# Patient Record
Sex: FEMALE | Race: WHITE | NOT HISPANIC OR LATINO | URBAN - METROPOLITAN AREA
[De-identification: names, ages, dates, MRNs, and addresses within clinical notes are randomized per-mention and may not be internally consistent; named-entity substitution may affect disease eponyms.]

---

## 2020-07-02 ENCOUNTER — OFFICE VISIT (OUTPATIENT)
Dept: URBAN - METROPOLITAN AREA CLINIC 72 | Facility: CLINIC | Age: 75
End: 2020-07-02

## 2020-07-25 ENCOUNTER — TELEPHONE ENCOUNTER (OUTPATIENT)
Dept: URBAN - METROPOLITAN AREA CLINIC 13 | Facility: CLINIC | Age: 75
End: 2020-07-25

## 2020-07-25 RX ORDER — ALBUTEROL SULFATE 90 UG/1
AEROSOL, METERED RESPIRATORY (INHALATION)
Refills: 0 | OUTPATIENT
End: 2020-07-02

## 2020-07-25 RX ORDER — SUCRALFATE 1 G/1
TABLET ORAL
Qty: 120 | Refills: 0 | OUTPATIENT
Start: 2019-08-30 | End: 2020-07-02

## 2020-07-25 RX ORDER — FAMOTIDINE 40 MG/1
TABLET ORAL
Qty: 180 | Refills: 0 | OUTPATIENT
Start: 2018-12-08 | End: 2020-07-02

## 2020-07-25 RX ORDER — THIAMINE HCL 100 MG
TABLET ORAL
Refills: 0 | OUTPATIENT
End: 2020-07-02

## 2020-07-26 ENCOUNTER — TELEPHONE ENCOUNTER (OUTPATIENT)
Dept: URBAN - METROPOLITAN AREA CLINIC 13 | Facility: CLINIC | Age: 75
End: 2020-07-26

## 2020-07-26 RX ORDER — ALBUTEROL SULFATE 90 UG/1
AEROSOL, METERED RESPIRATORY (INHALATION)
Qty: 8 | Refills: 0 | Status: ACTIVE | COMMUNITY
Start: 2018-10-03

## 2020-07-26 RX ORDER — EMPAGLIFLOZIN AND METFORMIN HYDROCHLORIDE 12.5; 1 MG/1; MG/1
TAKE TWO TABLETS BY MOUTH ONE TIME DAILY TABLET ORAL
Qty: 60 | Refills: 0 | Status: ACTIVE | COMMUNITY
Start: 2018-11-06

## 2020-07-26 RX ORDER — LEVOTHYROXINE SODIUM 0.1 MG/1
TABLET ORAL
Qty: 90 | Refills: 0 | Status: ACTIVE | COMMUNITY
Start: 2019-05-13

## 2020-07-26 RX ORDER — ATORVASTATIN CALCIUM 20 MG/1
TABLET, FILM COATED ORAL
Qty: 90 | Refills: 0 | Status: ACTIVE | COMMUNITY
Start: 2018-12-08

## 2020-07-26 RX ORDER — HYDROCODONE BITARTRATE AND ACETAMINOPHEN 5; 325 MG/1; MG/1
TABLET ORAL
Qty: 24 | Refills: 0 | Status: ACTIVE | COMMUNITY
Start: 2019-06-21

## 2020-07-26 RX ORDER — AMITRIPTYLINE HYDROCHLORIDE 25 MG/1
TABLET, FILM COATED ORAL
Qty: 30 | Refills: 0 | Status: ACTIVE | COMMUNITY
Start: 2019-09-26

## 2020-07-26 RX ORDER — NIFEDIPINE 60 MG/1
TAKE ONE TABLET BY MOUTH ONE TIME DAILY TABLET, FILM COATED, EXTENDED RELEASE ORAL
Qty: 90 | Refills: 0 | Status: ACTIVE | COMMUNITY
Start: 2020-02-06

## 2020-07-26 RX ORDER — DOCUSATE SODIUM 100 MG/1
TAKE ONE CAPSULE BY MOUTH THREE TIMES A DAY CAPSULE, LIQUID FILLED ORAL
Qty: 30 | Refills: 0 | Status: ACTIVE | COMMUNITY
Start: 2019-06-21

## 2020-07-26 RX ORDER — EMPAGLIFLOZIN, METFORMIN HYDROCHLORIDE 12.5; 1 MG/1; MG/1
TABLET, EXTENDED RELEASE ORAL
Refills: 0 | Status: ACTIVE | COMMUNITY
Start: 2019-10-02

## 2020-07-26 RX ORDER — METHYLPREDNISOLONE 4 MG/1
TABLET ORAL
Qty: 21 | Refills: 0 | Status: ACTIVE | COMMUNITY
Start: 2019-10-01

## 2020-07-26 RX ORDER — NIFEDIPINE 60 MG/1
TABLET, EXTENDED RELEASE ORAL
Qty: 90 | Refills: 0 | Status: ACTIVE | COMMUNITY
Start: 2019-06-10

## 2020-07-26 RX ORDER — NYSTATIN AND TRIAMCINOLONE ACETONIDE 100000; 1 MG/G; MG/G
CREAM TOPICAL
Qty: 15 | Refills: 0 | Status: ACTIVE | COMMUNITY
Start: 2018-10-09

## 2020-07-26 RX ORDER — DIAZEPAM 5 MG/1
TAKE ONE TABLET BY MOUTH 30 MINUTES PRIOR TO PROCEDURE TABLET ORAL
Qty: 1 | Refills: 0 | Status: ACTIVE | COMMUNITY
Start: 2019-09-10

## 2020-07-26 RX ORDER — EMPAGLIFLOZIN AND METFORMIN HYDROCHLORIDE 12.5; 1 MG/1; MG/1
TABLET ORAL
Qty: 60 | Refills: 0 | Status: ACTIVE | COMMUNITY
Start: 2019-04-07

## 2020-07-26 RX ORDER — EMPAGLIFLOZIN AND METFORMIN HYDROCHLORIDE 12.5; 1 MG/1; MG/1
TABLET ORAL
Qty: 60 | Refills: 0 | Status: ACTIVE | COMMUNITY
Start: 2019-09-24

## 2020-07-26 RX ORDER — LEVOTHYROXINE SODIUM 0.1 MG/1
TAKE 1 TABLET DAILY AS DIRECTED TABLET ORAL
Refills: 0 | Status: ACTIVE | COMMUNITY
Start: 2019-01-30

## 2020-07-26 RX ORDER — ASPIRIN 81 MG
TABLET, DELAYED RELEASE (ENTERIC COATED) ORAL
Refills: 0 | Status: ACTIVE | COMMUNITY

## 2020-07-26 RX ORDER — SUCRALFATE 1 G/1
TABLET ORAL
Qty: 120 | Refills: 0 | Status: ACTIVE | COMMUNITY
Start: 2019-08-30

## 2020-07-26 RX ORDER — NIFEDIPINE 60 MG/1
TAKE 1 TABLET DAILY TABLET, EXTENDED RELEASE ORAL
Refills: 0 | Status: ACTIVE | COMMUNITY
Start: 2018-12-01

## 2020-07-26 RX ORDER — FLUCONAZOLE 100 MG/1
TABLET ORAL
Qty: 8 | Refills: 0 | Status: ACTIVE | COMMUNITY
Start: 2018-11-16

## 2020-07-26 RX ORDER — FAMOTIDINE 40 MG/1
TABLET ORAL
Qty: 180 | Refills: 0 | Status: ACTIVE | COMMUNITY
Start: 2019-06-05

## 2020-07-26 RX ORDER — ATORVASTATIN CALCIUM 20 MG/1
TABLET, FILM COATED ORAL
Qty: 90 | Refills: 0 | Status: ACTIVE | COMMUNITY
Start: 2019-03-17

## 2020-07-26 RX ORDER — CALCIUM CARBONATE/VITAMIN D3 600 MG-20
TAKE 1 TABLET DAILY TABLET ORAL
Refills: 0 | Status: ACTIVE | COMMUNITY

## 2020-07-26 RX ORDER — PREDNISONE 20 MG/1
TABLET ORAL
Qty: 10 | Refills: 0 | Status: ACTIVE | COMMUNITY
Start: 2018-10-03

## 2020-07-26 RX ORDER — ONDANSETRON 4 MG/1
PLACE ONE TABLET ON THE TONGUE EVERY 6 HOURS FOR NAUSEA/VOMITING TABLET, ORALLY DISINTEGRATING ORAL
Qty: 5 | Refills: 0 | Status: ACTIVE | COMMUNITY
Start: 2019-09-13

## 2020-07-26 RX ORDER — FLUCONAZOLE 100 MG/1
TABLET ORAL
Qty: 8 | Refills: 0 | Status: ACTIVE | COMMUNITY
Start: 2018-10-09

## 2020-07-26 RX ORDER — TELMISARTAN AND HYDROCHLOROTHIAZIDE 25; 80 MG/1; MG/1
TABLET ORAL
Qty: 30 | Refills: 0 | Status: ACTIVE | COMMUNITY
Start: 2019-04-08

## 2020-07-26 RX ORDER — TELMISARTAN AND HYDROCHLOROTHIAZIDE 25; 80 MG/1; MG/1
TAKE 1 TABLET DAILY TABLET ORAL
Refills: 0 | Status: ACTIVE | COMMUNITY
Start: 2019-03-12

## 2020-07-26 RX ORDER — ONDANSETRON 4 MG/1
TABLET, ORALLY DISINTEGRATING ORAL
Qty: 20 | Refills: 0 | Status: ACTIVE | COMMUNITY
Start: 2019-06-21

## 2020-07-26 RX ORDER — ALENDRONATE SODIUM 70 MG/1
TAKE ONE TABLET BY MOUTH EVERY WEEK TABLET ORAL
Qty: 12 | Refills: 0 | Status: ACTIVE | COMMUNITY
Start: 2020-02-06

## 2020-07-26 RX ORDER — DIAZEPAM 5 MG/1
TABLET ORAL
Qty: 1 | Refills: 0 | Status: ACTIVE | COMMUNITY
Start: 2019-09-12

## 2020-07-26 RX ORDER — INSULIN DETEMIR 100 [IU]/ML
INJECTION, SOLUTION SUBCUTANEOUS
Qty: 15 | Refills: 0 | Status: ACTIVE | COMMUNITY
Start: 2018-10-04

## 2020-07-26 RX ORDER — AMOXICILLIN 875 MG/1
TABLET, FILM COATED ORAL
Qty: 14 | Refills: 0 | Status: ACTIVE | COMMUNITY
Start: 2018-10-03

## 2020-07-26 RX ORDER — INSULIN DETEMIR 100 [IU]/ML
INJECTION, SOLUTION SUBCUTANEOUS
Qty: 15 | Refills: 0 | Status: ACTIVE | COMMUNITY
Start: 2018-11-09

## 2020-07-26 RX ORDER — AMOXICILLIN 875 MG/1
TABLET, FILM COATED ORAL
Qty: 14 | Refills: 0 | Status: ACTIVE | COMMUNITY
Start: 2019-09-03

## 2020-07-26 RX ORDER — METHYLPREDNISOLONE 4 MG/1
TAKE AS DIRECTED FOR 5 DAYS TABLET ORAL
Qty: 21 | Refills: 0 | Status: ACTIVE | COMMUNITY
Start: 2019-10-07

## 2020-07-26 RX ORDER — OXYCODONE HYDROCHLORIDE 5 MG/1
TAKE ONE TABLET BY MOUTH EVERY 4 TO 6 HOURS AS NEEDED FOR SEVERE PAIN TABLET ORAL
Qty: 7 | Refills: 0 | Status: ACTIVE | COMMUNITY
Start: 2019-09-13

## 2020-11-03 ENCOUNTER — OFFICE VISIT (OUTPATIENT)
Dept: URBAN - METROPOLITAN AREA CLINIC 72 | Facility: CLINIC | Age: 75
End: 2020-11-03

## 2021-03-10 ENCOUNTER — WEB ENCOUNTER (OUTPATIENT)
Dept: URBAN - METROPOLITAN AREA CLINIC 72 | Facility: CLINIC | Age: 76
End: 2021-03-10

## 2021-03-10 ENCOUNTER — OFFICE VISIT (OUTPATIENT)
Dept: URBAN - METROPOLITAN AREA CLINIC 72 | Facility: CLINIC | Age: 76
End: 2021-03-10
Payer: MEDICARE

## 2021-03-10 VITALS
RESPIRATION RATE: 20 BRPM | HEART RATE: 85 BPM | BODY MASS INDEX: 36.53 KG/M2 | TEMPERATURE: 97.5 F | HEIGHT: 68 IN | DIASTOLIC BLOOD PRESSURE: 63 MMHG | WEIGHT: 241 LBS | SYSTOLIC BLOOD PRESSURE: 109 MMHG

## 2021-03-10 DIAGNOSIS — R15.1 FECAL SMEARING: ICD-10-CM

## 2021-03-10 DIAGNOSIS — R10.13 DYSPEPSIA: ICD-10-CM

## 2021-03-10 PROCEDURE — 99213 OFFICE O/P EST LOW 20 MIN: CPT | Performed by: INTERNAL MEDICINE

## 2021-03-10 RX ORDER — FAMOTIDINE 40 MG/1
TABLET ORAL
Qty: 180 | Refills: 0 | Status: ACTIVE | COMMUNITY
Start: 2019-06-05

## 2021-03-10 RX ORDER — LEVOTHYROXINE SODIUM 0.1 MG/1
TAKE 1 TABLET DAILY AS DIRECTED TABLET ORAL
Refills: 0 | Status: ACTIVE | COMMUNITY
Start: 2019-01-30

## 2021-03-10 RX ORDER — NIFEDIPINE 60 MG/1
TAKE ONE TABLET BY MOUTH ONE TIME DAILY TABLET, FILM COATED, EXTENDED RELEASE ORAL
Qty: 90 | Refills: 0 | Status: ON HOLD | COMMUNITY
Start: 2020-02-06

## 2021-03-10 RX ORDER — INSULIN DEGLUDEC INJECTION 100 U/ML
AS DIRECTED INJECTION, SOLUTION SUBCUTANEOUS
Status: ACTIVE | COMMUNITY

## 2021-03-10 RX ORDER — ONDANSETRON 4 MG/1
TABLET, ORALLY DISINTEGRATING ORAL
Qty: 20 | Refills: 0 | Status: ON HOLD | COMMUNITY
Start: 2019-06-21

## 2021-03-10 RX ORDER — AMITRIPTYLINE HYDROCHLORIDE 25 MG/1
TABLET, FILM COATED ORAL
Qty: 30 | Refills: 0 | Status: ON HOLD | COMMUNITY
Start: 2019-09-26

## 2021-03-10 RX ORDER — ALENDRONATE SODIUM 70 MG/1
TAKE ONE TABLET BY MOUTH EVERY WEEK TABLET ORAL
Qty: 12 | Refills: 0 | Status: ON HOLD | COMMUNITY
Start: 2020-02-06

## 2021-03-10 RX ORDER — OXYCODONE HYDROCHLORIDE 5 MG/1
TAKE ONE TABLET BY MOUTH EVERY 4 TO 6 HOURS AS NEEDED FOR SEVERE PAIN TABLET ORAL
Qty: 7 | Refills: 0 | Status: ON HOLD | COMMUNITY
Start: 2019-09-13

## 2021-03-10 RX ORDER — RIVAROXABAN 20 MG/1
1 TABLET WITH FOOD TABLET, FILM COATED ORAL ONCE A DAY
Status: ACTIVE | COMMUNITY

## 2021-03-10 RX ORDER — DOCUSATE SODIUM 100 MG/1
TAKE ONE CAPSULE BY MOUTH THREE TIMES A DAY CAPSULE, LIQUID FILLED ORAL
Qty: 30 | Refills: 0 | Status: ON HOLD | COMMUNITY
Start: 2019-06-21

## 2021-03-10 RX ORDER — FLUCONAZOLE 100 MG/1
TABLET ORAL
Qty: 8 | Refills: 0 | Status: ON HOLD | COMMUNITY
Start: 2018-10-09

## 2021-03-10 RX ORDER — SUCRALFATE 1 G/1
TABLET ORAL
Qty: 120 | Refills: 0 | Status: ON HOLD | COMMUNITY
Start: 2019-08-30

## 2021-03-10 RX ORDER — NYSTATIN AND TRIAMCINOLONE ACETONIDE 100000; 1 MG/G; MG/G
CREAM TOPICAL
Qty: 15 | Refills: 0 | Status: ON HOLD | COMMUNITY
Start: 2018-10-09

## 2021-03-10 RX ORDER — EMPAGLIFLOZIN AND METFORMIN HYDROCHLORIDE 12.5; 1 MG/1; MG/1
TAKE TWO TABLETS BY MOUTH ONE TIME DAILY TABLET ORAL
Qty: 60 | Refills: 0 | Status: ACTIVE | COMMUNITY
Start: 2018-11-06

## 2021-03-10 RX ORDER — HYDROCODONE BITARTRATE AND ACETAMINOPHEN 5; 325 MG/1; MG/1
TABLET ORAL
Qty: 24 | Refills: 0 | Status: ON HOLD | COMMUNITY
Start: 2019-06-21

## 2021-03-10 RX ORDER — METHYLPREDNISOLONE 4 MG/1
TABLET ORAL
Qty: 21 | Refills: 0 | Status: ON HOLD | COMMUNITY
Start: 2019-10-01

## 2021-03-10 RX ORDER — PREDNISONE 20 MG/1
TABLET ORAL
Qty: 10 | Refills: 0 | Status: ON HOLD | COMMUNITY
Start: 2018-10-03

## 2021-03-10 RX ORDER — TELMISARTAN AND HYDROCHLOROTHIAZIDE 25; 80 MG/1; MG/1
TAKE 1 TABLET DAILY TABLET ORAL
Refills: 0 | Status: ACTIVE | COMMUNITY
Start: 2019-03-12

## 2021-03-10 RX ORDER — INSULIN DEGLUDEC INJECTION 100 U/ML
AS DIRECTED INJECTION, SOLUTION SUBCUTANEOUS
Status: ON HOLD | COMMUNITY

## 2021-03-10 RX ORDER — INSULIN DETEMIR 100 [IU]/ML
INJECTION, SOLUTION SUBCUTANEOUS
Qty: 15 | Refills: 0 | Status: ON HOLD | COMMUNITY
Start: 2018-10-04

## 2021-03-10 RX ORDER — NIFEDIPINE 60 MG/1
TAKE 1 TABLET DAILY TABLET, EXTENDED RELEASE ORAL
Refills: 0 | Status: ACTIVE | COMMUNITY
Start: 2018-12-01

## 2021-03-10 RX ORDER — AMOXICILLIN 875 MG/1
TABLET, FILM COATED ORAL
Qty: 14 | Refills: 0 | Status: ON HOLD | COMMUNITY
Start: 2018-10-03

## 2021-03-10 RX ORDER — EMPAGLIFLOZIN, METFORMIN HYDROCHLORIDE 12.5; 1 MG/1; MG/1
TABLET, EXTENDED RELEASE ORAL
Refills: 0 | Status: ON HOLD | COMMUNITY
Start: 2019-10-02

## 2021-03-10 RX ORDER — ATORVASTATIN CALCIUM 20 MG/1
TABLET, FILM COATED ORAL
Qty: 90 | Refills: 0 | Status: ACTIVE | COMMUNITY
Start: 2018-12-08

## 2021-03-10 RX ORDER — ASPIRIN 81 MG
TABLET, DELAYED RELEASE (ENTERIC COATED) ORAL
Refills: 0 | Status: ACTIVE | COMMUNITY

## 2021-03-10 RX ORDER — CALCIUM CARBONATE/VITAMIN D3 600 MG-20
TAKE 1 TABLET DAILY TABLET ORAL
Refills: 0 | Status: ACTIVE | COMMUNITY

## 2021-03-10 RX ORDER — ALBUTEROL SULFATE 90 UG/1
AEROSOL, METERED RESPIRATORY (INHALATION)
Qty: 8 | Refills: 0 | Status: ACTIVE | COMMUNITY
Start: 2018-10-03

## 2021-03-10 RX ORDER — DIAZEPAM 5 MG/1
TAKE ONE TABLET BY MOUTH 30 MINUTES PRIOR TO PROCEDURE TABLET ORAL
Qty: 1 | Refills: 0 | Status: ON HOLD | COMMUNITY
Start: 2019-09-10

## 2021-03-10 NOTE — HPI-TODAY'S VISIT:
Ms. Mcclure is a 75-year-old female who returns for follow-up, she was last seen in our office in July 2020.  She is a history of right upper quadrant pain. She had persistent right upper quadrant pain and ultimately developed biliary dyskinesis and had a cholecystectomy.  Pathology revealed chronic cholecystitis.  Postoperatively she had persistent epigastric pain which prompted an EGD which showed active gastritis, she was placed on Pepcid and Carafate for this with minimal improvement she ultimately stop the Carafate and weaned off the Pepcid.  She reported abnormal taste and had intermittent heartburn and states she felt like she had reflux she was not on any medication we last saw her and does not practice routine my spell modifications.  We advised aggressive lifestyle modifications including elevation of the head of bed bleeding time between meals and recumbency, weight loss and avoiding overeating.  We discussed over-the-counter as needed treatment since her symptoms were intermittent and by the four-month follow-up.  She now returns for follow-up.   she is having some issues with dyspepsia notes that when she eats the dyspepsia improves, she is taking Pepcid as needed.  In addition she notes gassiness, was started on a CPAP and feels that that is contributing to her nocturnal gassiness.  Also having issues with excessive wiping and not feeling clean, notes that she has external skin tags and feels these are complicating the matter.  No true incontinence.  No blood in her stool.  She is not on any fiber products, she has gotten relief with the Pepcid.  She was diagnosed with a blood clot in September and now is on anticoagulation indefinitely as this was her second unprovoked blood clot.

## 2021-05-06 ENCOUNTER — APPOINTMENT (OUTPATIENT)
Dept: INTERNAL MEDICINE | Facility: CLINIC | Age: 76
End: 2021-05-06
Payer: MEDICARE

## 2021-05-06 VITALS
DIASTOLIC BLOOD PRESSURE: 60 MMHG | BODY MASS INDEX: 36.88 KG/M2 | WEIGHT: 235 LBS | SYSTOLIC BLOOD PRESSURE: 120 MMHG | HEIGHT: 67 IN

## 2021-05-06 DIAGNOSIS — K59.00 CONSTIPATION, UNSPECIFIED: ICD-10-CM

## 2021-05-06 PROBLEM — Z00.00 ENCOUNTER FOR PREVENTIVE HEALTH EXAMINATION: Status: ACTIVE | Noted: 2021-05-06

## 2021-05-06 PROCEDURE — 99204 OFFICE O/P NEW MOD 45 MIN: CPT

## 2021-05-06 RX ORDER — INSULIN DEGLUDEC INJECTION 100 U/ML
100 INJECTION, SOLUTION SUBCUTANEOUS
Refills: 0 | Status: ACTIVE | COMMUNITY

## 2021-05-06 NOTE — HISTORY OF PRESENT ILLNESS
[de-identified] : 77 y/o female is in the office to establish care and for consultation about her medical issues.\par She has a past medical history of recently diagnosed stage IV ovarian cancer, hyperlipidemia, hypothyroidism, DVT x 2, insulin dependent diabetes and chronic GERD.  She recently moved here from [place] and also recently saw an oncologist at [facility] in [place].  She is scheduled to begin chemo in 2 days and needs diabetes management prior.  She is going to receiving Decadron with the chemo which will make her sugars erratic.  She previously was maintained on Tresiba 40 units daily but has recently had a hypoglycemic episode after only taking 5 units.  She has not been eating well at all.  She was recently prescribed Humalog or NovoLog by a previous doctor before she left [place].  She states that recently her reflux symptoms that were controlled with Pepcid 40 mg have gotten much worse.  She was also recently in [facility] emergency room because of weakness and constipation.  She was discharged with instructions to try to help with her constipation issue.

## 2021-05-06 NOTE — PHYSICAL EXAM
[No Acute Distress] : no acute distress [No Respiratory Distress] : no respiratory distress  [Normal Rate] : normal rate  [Regular Rhythm] : with a regular rhythm [No Joint Swelling] : no joint swelling [Normal] : affect was normal and insight and judgment were intact

## 2021-05-06 NOTE — REVIEW OF SYSTEMS
[Fatigue] : fatigue [Recent Change In Weight] : ~T recent weight change [Abdominal Pain] : abdominal pain [Constipation] : constipation [Joint Pain] : joint pain [Muscle Weakness] : muscle weakness [Back Pain] : back pain [Anxiety] : anxiety [Fever] : no fever [Chills] : no chills [Chest Pain] : no chest pain [Palpitations] : no palpitations [Lower Ext Edema] : no lower extremity edema [Shortness Of Breath] : no shortness of breath [Wheezing] : no wheezing [Headache] : no headache [Dizziness] : no dizziness [Easy Bleeding] : no easy bleeding

## 2021-05-06 NOTE — PLAN
[FreeTextEntry1] : Metastatic ovarian cancer–she will be starting chemotherapy on 5/8 at [facility] in [place].  Hydration and nutrition were discussed and stressed.  Various options for nutritional supplementation were discussed.\par \par Diabetes–she is going to hold the Tresiba for now and was explained that this being a long-acting insulin it is more dangerous for her right now.  She will be checking her fingersticks before meals or 4 times a day and will use the Humalog or NovoLog on a sliding scale that was explained.  She understands the importance of avoiding hypoglycemia and was given instructions on how to manage it if it occurs.\par \par Chronic GERD–symptoms seem to be more severe lately and she will continue with Pepcid 40 mg in the evening but will also now start Protonix 40 mg during the day.\par \par Hypertension blood pressure is controlled and she will continue with Procardia XL 60 mg daily.\par \par Hypothyroidism–recent TSH was acceptable and she will continue on levothyroxine 112 daily.\par \par Hyperlipidemia–she will continue with Lipitor 20 mg for now.\par \par History of DVT x2–she will continue Xarelto 20 mg daily.\par \par Constipation–she has been using a combination of MiraLAX, Colace and senna.  She was told she could also use milk of magnesia at times if needed also.  She understands to adjust the use of these medications based on her bowel movements.\par \par A total of 50 minutes was spent with this patient discussing all her medical problems and how to manage them.

## 2021-05-07 RX ORDER — ASPIRIN 81 MG/1
81 TABLET ORAL
Refills: 0 | Status: ACTIVE | COMMUNITY
Start: 2021-05-07

## 2021-05-10 DIAGNOSIS — G47.00 INSOMNIA, UNSPECIFIED: ICD-10-CM

## 2021-05-10 RX ORDER — CHLORHEXIDINE GLUCONATE 4 %
5 LIQUID (ML) TOPICAL DAILY
Qty: 90 | Refills: 1 | Status: ACTIVE | COMMUNITY
Start: 2021-05-07 | End: 1900-01-01

## 2021-06-13 ENCOUNTER — EMERGENCY (EMERGENCY)
Facility: HOSPITAL | Age: 76
LOS: 0 days | Discharge: HOME | End: 2021-06-13
Attending: EMERGENCY MEDICINE
Payer: MEDICARE

## 2021-06-13 VITALS
DIASTOLIC BLOOD PRESSURE: 69 MMHG | TEMPERATURE: 97 F | RESPIRATION RATE: 18 BRPM | HEART RATE: 72 BPM | SYSTOLIC BLOOD PRESSURE: 179 MMHG | OXYGEN SATURATION: 99 %

## 2021-06-13 VITALS
WEIGHT: 216.93 LBS | HEART RATE: 77 BPM | RESPIRATION RATE: 18 BRPM | HEIGHT: 66 IN | OXYGEN SATURATION: 98 % | DIASTOLIC BLOOD PRESSURE: 76 MMHG | SYSTOLIC BLOOD PRESSURE: 183 MMHG

## 2021-06-13 DIAGNOSIS — I10 ESSENTIAL (PRIMARY) HYPERTENSION: ICD-10-CM

## 2021-06-13 DIAGNOSIS — E78.5 HYPERLIPIDEMIA, UNSPECIFIED: ICD-10-CM

## 2021-06-13 DIAGNOSIS — Z86.718 PERSONAL HISTORY OF OTHER VENOUS THROMBOSIS AND EMBOLISM: ICD-10-CM

## 2021-06-13 DIAGNOSIS — J45.909 UNSPECIFIED ASTHMA, UNCOMPLICATED: ICD-10-CM

## 2021-06-13 DIAGNOSIS — Z79.899 OTHER LONG TERM (CURRENT) DRUG THERAPY: ICD-10-CM

## 2021-06-13 DIAGNOSIS — Z79.01 LONG TERM (CURRENT) USE OF ANTICOAGULANTS: ICD-10-CM

## 2021-06-13 DIAGNOSIS — C56.9 MALIGNANT NEOPLASM OF UNSPECIFIED OVARY: ICD-10-CM

## 2021-06-13 DIAGNOSIS — Z88.1 ALLERGY STATUS TO OTHER ANTIBIOTIC AGENTS STATUS: ICD-10-CM

## 2021-06-13 PROCEDURE — 99283 EMERGENCY DEPT VISIT LOW MDM: CPT

## 2021-06-13 PROCEDURE — 93010 ELECTROCARDIOGRAM REPORT: CPT

## 2021-06-13 PROCEDURE — 93005 ELECTROCARDIOGRAM TRACING: CPT

## 2021-06-13 PROCEDURE — 99284 EMERGENCY DEPT VISIT MOD MDM: CPT

## 2021-06-13 NOTE — ED PROVIDER NOTE - CLINICAL SUMMARY MEDICAL DECISION MAKING FREE TEXT BOX
Asymptomatic HTN, likely due to stopping Nifedipine. Normal neuro exam, baseline blood pressure 140s systolic. Asymptomatic HTN, likely due to stopping Nifedipine. hasn't started bp meds till 3d ago. Normal neuro exam, baseline blood pressure 140s systolic. ekg unchaged from prior

## 2021-06-13 NOTE — ED PROVIDER NOTE - PATIENT PORTAL LINK FT
You can access the FollowMyHealth Patient Portal offered by [facility] by registering at the following website: [email or web address]. By joining [employer]’s FollowMyHealth portal, you will also be able to view your health information using other applications (apps) compatible with our system.

## 2021-06-13 NOTE — ED PROVIDER NOTE - PMH
Ovarian cancer     Asthma    DVT (deep venous thrombosis)    HLD (hyperlipidemia)    Ovarian cancer

## 2021-06-13 NOTE — ED PROVIDER NOTE - NS ED ROS FT
Review of Systems:  	•	CONSTITUTIONAL: no fever  	•	SKIN: no rash  	•	RESPIRATORY: no shortness of breath  	•	CARDIAC: no chest pain  	•	GI:  no abd pain, no nausea, no vomiting, no diarrhea  	•	GENITO-URINARY:  no dysuria  	•	MUSCULOSKELETAL:  no back pain  	•	NEUROLOGIC: no weakness  	•	ALLERGY: no rhinorrhea  	•	PSYSCHIATRIC: appropriate concern about symptoms Review of Systems:  	•	CONSTITUTIONAL: no fever  	•	SKIN: no rash  	•	RESPIRATORY: no shortness of breath  	•	CARDIAC: no chest pain, no dizziness.  	•	GI:  no abd pain, no nausea, no vomiting, no diarrhea  	•	GENITO-URINARY:  no dysuria  	•	MUSCULOSKELETAL:  no back pain  	•	NEUROLOGIC: no weakness, no blurry vision, no HA  	•	ALLERGY: no rhinorrhea  	•	PSYSCHIATRIC: appropriate concern about symptoms

## 2021-06-13 NOTE — ED PROVIDER NOTE - NSFOLLOWUPINSTRUCTIONS_ED_ALL_ED_FT
FOLLOW UP WITH PMD & ONCOLOGIST WITHIN 1-2DAYS, CALL TO MAKE APPOINTMENT  COME BACK TO ED IF YOUR CONDITION WORSENS OR IF YOU DEVELOP FEVER GREATER THAN 100.4F, CHEST PAIN,  SHORTNESS OF BREATH, HEADACHE, BLURRY VISION OR ANY OTHER SYMPTOMS CONCERNING TO YOU  TAKE TYLENOL (ACETAMINOPHEN) 650 MG EVERY 6 HOURS AS NEEDED FOR PAIN  Hypertension    WHAT YOU NEED TO KNOW:    Hypertension is high blood pressure. Your blood pressure is the force of your blood moving against the walls of your arteries. Hypertension causes your blood pressure to get so high that your heart has to work much harder than normal. This can damage your heart. The cause of hypertension may not be known. This is called essential or primary hypertension. Hypertension caused by another medical condition, such as kidney disease, is called secondary hypertension.    DISCHARGE INSTRUCTIONS:    Call 911 for any of the following:     You have chest pain.      You have any of the following signs of a heart attack:   Squeezing, pressure, or pain in your chest       and any of the following:   Discomfort or pain in your back, neck, jaw, stomach, or arm       Shortness of breath      Nausea or vomiting      Lightheadedness or a sudden cold sweat      You become confused or have difficulty speaking.      You suddenly feel lightheaded or have trouble breathing.    Return to the emergency department if:     You have a severe headache or vision loss.      You have weakness in an arm or leg.     Contact your healthcare provider if:     You feel faint, dizzy, confused, or drowsy.       You have been taking your blood pressure medicine but your pressure is higher than your provider says it should be.      You have questions or concerns about your condition or care.    Medicines: You may need any of the following:     Antihypertensives may be used to help lower your blood pressure. Several kinds of medicines are available. Your healthcare provider will choose medicines based on the kind of hypertension you have. You may need more than one type of medicine. Take the medicine exactly as directed.       Diuretics help decrease extra fluid that collects in your body. This will help lower your blood pressure. You may urinate more often while you take this medicine.      Cholesterol medicine helps lower your cholesterol level. A low cholesterol level helps prevent heart disease and makes it easier to control your blood pressure.       Take your medicine as directed. Contact your healthcare provider if you think your medicine is not helping or if you have side effects. Tell him or her if you are allergic to any medicine. Keep a list of the medicines, vitamins, and herbs you take. Include the amounts, and when and why you take them. Bring the list or the pill bottles to follow-up visits. Carry your medicine list with you in case of an emergency.    Follow up with your healthcare provider as directed: You will need to return to have your blood pressure checked and to have other lab tests done. Write down your questions so you remember to ask them during your visits.     Stages of hypertension: Blood Pressure Readings         Normal blood pressure is 119/79 or lower. Your healthcare provider may only check your blood pressure each year if it stays at a normal level.      Elevated blood pressure is 120/79 to 129/79. This is sometimes called prehypertension. Your healthcare provider may suggest lifestyle changes to help lower your blood pressure to a normal level. He or she may then check it again in 3 to 6 months.      Stage 1 hypertension is 130/80 to 139/89. Your provider may recommend lifestyle changes, medication, and checks every 3 to 6 months until your blood pressure is controlled.      Stage 2 hypertension is 140/90 or higher. Your provider will recommend lifestyle changes and have you take 2 kinds of hypertension medicines. You will also need to have your blood pressure checked monthly until it is controlled.    Manage hypertension:     Check your blood pressure at home. Avoid smoking, caffeine, and exercise at least 30 minutes before checking your blood pressure. Sit and rest for 5 minutes before you take your blood pressure. Extend your arm and support it on a flat surface. Your arm should be at the same level as your heart. Follow the directions that came with your blood pressure monitor. Check your blood pressure 2 times, 1 minute apart, before you take your medicine in the morning. Also check your blood pressure before your evening meal. Keep a record of your readings and bring it to your follow-up visits. Ask your healthcare provider what your blood pressure should be. How to take a Blood Pressure           Manage any other health conditions you have. Health conditions such as diabetes can increase your risk for hypertension. Follow your healthcare provider's instructions and take all your medicines as directed.       Ask about all medicines. Certain medicines can increase your blood pressure. Examples include oral birth control pills, decongestants, herbal supplements, and NSAIDs, such as ibuprofen. Your healthcare provider can tell you which medicines are safe for you to take. This includes prescription and over-the-counter medicines.    Lifestyle changes you can make to manage hypertension:     Limit sodium (salt) as directed. Too much sodium can affect your fluid balance. Check labels to find low-sodium or no-salt-added foods. Some low-sodium foods use potassium salts for flavor. Too much potassium can also cause health problems. Your healthcare provider will tell you how much sodium and potassium are safe for you to have in a day. He or she may recommend that you limit sodium to 2,300 mg a day.           Follow the meal plan recommended by your healthcare provider. A dietitian or your provider can give you more information on low-sodium plans or the DASH (Dietary Approaches to Stop Hypertension) eating plan. The DASH plan is low in sodium, unhealthy fats, and total fat. It is high in potassium, calcium, and fiber.            Exercise to maintain a healthy weight. Exercise at least 30 minutes per day, on most days of the week. This will help decrease your blood pressure. Ask your healthcare provider about the best exercise plan for you. Walking for Exercise           Decrease stress. This may help lower your blood pressure. Learn ways to relax, such as deep breathing or listening to music.      Limit alcohol as directed. Alcohol can increase your blood pressure. A drink of alcohol is 12 ounces of beer, 5 ounces of wine, or 1½ ounces of liquor.      Do not smoke. Nicotine and other chemicals in cigarettes and cigars can increase your blood pressure and also cause lung damage. Ask your healthcare provider for information if you currently smoke and need help to quit. E-cigarettes or smokeless tobacco still contain nicotine. Talk to your healthcare provider before you use these products.

## 2021-06-13 NOTE — ED PROVIDER NOTE - PHYSICAL EXAMINATION
*GEN: No acute distress, well appearing   *HEAD: Normocephalic, Atraumatic  *EYES/NOSE: b/l Pupils symmetric & Reactive to ligth, EOMI b/l  *THROAT: airway patent, moist mucous membranes  *NECK: Neck supple  *PULMONARY: No Respiratory distress, symmetric b/l chest rise  *CARDIAC: s1s2, regular rhythm   *ABDOMEN:  Non Tender, Non Distended, soft, no guarding, no rebound, no masses   *BACK: no CVA tenderness, No midline vertebral tenderness to palpation   *EXTREMITIES: symmetric pulses, 2+ DP & radial pulses, no cyanosis, no edema   *SKIN: no rash, no bruising   *NEUROLOGIC: alert,  full active & passive ROM in all 4 extremities,   *PSYCH: appropriate concern about symptoms, pleasant *GEN: No acute distress, well appearing   *HEAD: Normocephalic, Atraumatic  *EYES/NOSE: b/l Pupils symmetric & Reactive to ligth, EOMI b/l  *THROAT: airway patent, moist mucous membranes  *NECK: Neck supple  *PULMONARY: No Respiratory distress, symmetric b/l chest rise  *CARDIAC: s1s2, regular rhythm   *ABDOMEN:  Non Tender, Non Distended, soft, no guarding, no rebound, no masses   *BACK: no CVA tenderness, No midline vertebral tenderness to palpation   *EXTREMITIES: symmetric pulses, 2+ DP & radial pulses, no cyanosis, no edema   *SKIN: no rash, no bruising   *NEUROLOGIC: alert,  full active & passive ROM in all 4 extremities, CN 2-12 intact, normal finger to nose & heel to shin; no dysdiadochokinesis; equal & normal strength & sensation in b/l UE/LE; full active & passive ROM in all extremeties,  no pronator drift, normal patellar reflex, normal gait, romberg sign negative  *PSYCH: appropriate concern about symptoms, pleasant

## 2021-06-13 NOTE — ED ADULT NURSE NOTE - NSIMPLEMENTINTERV_GEN_ALL_ED
Implemented All Fall Risk Interventions:  [place] to call system. Call bell, personal items and telephone within reach. Instruct patient to call for assistance. Room bathroom lighting operational. Non-slip footwear when patient is off stretcher. Physically safe environment: no spills, clutter or unnecessary equipment. Stretcher in lowest position, wheels locked, appropriate side rails in place. Provide visual cue, wrist band, yellow gown, etc. Monitor gait and stability. Monitor for mental status changes and reorient to person, place, and time. Review medications for side effects contributing to fall risk. Reinforce activity limits and safety measures with patient and family.

## 2021-06-13 NOTE — ED PROVIDER NOTE - OBJECTIVE STATEMENT
Pertinent HPI/PMH/PSH/FHx/SHx and Review of Systems contained within  HPI:  Patient p/w CC   x  days, new onset vs acute on chronic.   PMH/PSH relevant for: Ovarian Cancer on Chemotherapy. Allergic to Tetracycline.  ROS negative for: fever, Chest pain, SOB, Nausea, vomiting, diarrhea, abdominal pain, dysuria    FamilyHx and SocialHx not otherwise contributory Pertinent HPI/PMH/PSH/FHx/SHx and Review of Systems contained within  HPI:  Patient p/w CC elevated blood pressure 170/. Pt states her blood pressure varies with chemo, but is normally in the 130/140s. Pt states last week Avastin was added to her treatment and thinks that is what's causing her high blood pressure. Last Saturday, stopped her Nifedipine and didn't take any blood pressure medications until Friday when she was put on Losartan 25 mg, which was upped to 50 mg today. States she took 50 mg of Losartan today. Pt denies any blurry vision, HA, dizziness.  PMH/PSH relevant for: Ovarian Cancer (diagnosed 4/14/21) on Chemotherapy (Bevacizumab, Carboplatin, Paclitaxel[place] [facility] [place] Hx of LLE DVT on Xarelto. Hx of asthma. Hx of HLD. Allergic to Tetracycline.  ROS negative for: fever, Chest pain, SOB, Nausea, vomiting, diarrhea, abdominal pain, dysuria, blurry vision, HA, dizziness.  FamilyHx and SocialHx not otherwise contributory Pertinent HPI/PMH/PSH/FHx/SHx and Review of Systems contained within  HPI:  Patient p/w CC elevated blood pressure. Pt states her blood pressure varies with chemo, but is normally in the 130/140s. Pt states last week Avastin was added to her treatment and thinks that is what's causing her high blood pressure. Last Saturday, stopped her Nifedipine and didn't take any blood pressure medications until Friday when she was put on Losartan 25 mg, which was upped to 50 mg today. States she took 50 mg of Losartan today. Pt denies any blurry vision, HA, dizziness.  PMH/PSH relevant for: Ovarian Cancer (diagnosed 4/14/21) [place], [place] [facility] [place]x of LLE DVT on Xarelto. Hx of asthma. Hx of HLD. Allergic to Tetracycline.  ROS negative for: fever, Chest pain, SOB, Nausea, vomiting, diarrhea, abdominal pain, dysuria, blurry vision, HA, dizziness.  FamilyHx and SocialHx not otherwise contributory Pertinent HPI/PMH/PSH/FHx/SHx and Review of Systems contained within  HPI:  Patient p/w CC elevated blood pressure 170/100. Pt states her blood pressure varies with chemo, but is normally in the 130/140s. Pt states last week Avastin was added to her treatment and thinks that is what's causing her high blood pressure. Last Saturday, stopped her Nifedipine and didn't take any blood pressure medications until Friday when she was put on Losartan 25 mg, which was upped to 50 mg today. States she took 50 mg of Losartan today. Pt denies any blurry vision, HA, dizziness.  PMH/PSH relevant for: Ovarian Cancer (diagnosed 4/14/21) on Chemotherapy (Bevacizumab, Carboplatin, Paclitaxel) at [facility]. Hx of LLE DVT on Xarelto. Hx of asthma. Hx of HLD. Allergic to Tetracycline.  ROS negative for: fever, Chest pain, SOB, Nausea, vomiting, diarrhea, abdominal pain, dysuria, blurry vision, HA, dizziness.  FamilyHx and SocialHx not otherwise contributory Pertinent HPI/PMH/PSH/FHx/SHx and Review of Systems contained within  HPI:  Patient p/w CC elevated blood pressure 170/100. Pt states her blood pressure varies with chemo, but is normally in the 130-140s. Pt states last week Avastin was added to her treatment and thinks that is what's causing her high blood pressure. Last Saturday, stopped her Nifedipine and didn't take any blood pressure medications until Friday when she was put on Losartan 25 mg, which was upped to 50 mg today. States she took 50 mg of Losartan today. Pt denies any blurry vision, HA, dizziness.  PMH/PSH relevant for: Ovarian Cancer (diagnosed 4/14/21) on Chemotherapy (Bevacizumab, Carboplatin, Paclitaxel) at [facility] [place]. Hx of LLE DVT on Xarelto. Hx of asthma. Hx of HLD. Allergic to Tetracycline.  ROS negative for: fever, Chest pain, SOB, Nausea, vomiting, diarrhea, abdominal pain, dysuria, blurry vision, HA, dizziness.  FamilyHx and SocialHx not otherwise contributory

## 2021-06-13 NOTE — ED ADULT TRIAGE NOTE - CHIEF COMPLAINT QUOTE
Pt. to the ED BIB Son C/O Hypertension- Pt. states she has hx. of Ovarian Cancer on Chemo and states a Medication named Avastin is making her BP high

## 2021-06-13 NOTE — ED ADULT NURSE NOTE - OBJECTIVE STATEMENT
Patient states she took her BP this morning and it was 187/104 . BP here was lower Patient color good .

## 2021-06-20 ENCOUNTER — INPATIENT (INPATIENT)
Facility: HOSPITAL | Age: 76
LOS: 2 days | Discharge: HOME HEALTH | DRG: 69 | End: 2021-06-23
Attending: FAMILY MEDICINE | Admitting: INTERNAL MEDICINE
Payer: MEDICARE

## 2021-06-20 VITALS — HEIGHT: 67 IN | WEIGHT: 199.96 LBS

## 2021-06-20 DIAGNOSIS — N39.0 URINARY TRACT INFECTION, SITE NOT SPECIFIED: ICD-10-CM

## 2021-06-20 LAB
ALBUMIN SERPL ELPH-MCNC: 3 G/DL — LOW (ref 3.3–5)
ALP SERPL-CCNC: 101 U/L — SIGNIFICANT CHANGE UP (ref 40–120)
ALT FLD-CCNC: 21 U/L — SIGNIFICANT CHANGE UP (ref 12–78)
ANION GAP SERPL CALC-SCNC: 5 MMOL/L — SIGNIFICANT CHANGE UP (ref 5–17)
APPEARANCE UR: CLEAR — SIGNIFICANT CHANGE UP
AST SERPL-CCNC: 18 U/L — SIGNIFICANT CHANGE UP (ref 15–37)
BASOPHILS # BLD AUTO: 0.06 K/UL — SIGNIFICANT CHANGE UP (ref 0–0.2)
BASOPHILS NFR BLD AUTO: 1.4 % — SIGNIFICANT CHANGE UP (ref 0–2)
BILIRUB SERPL-MCNC: 0.3 MG/DL — SIGNIFICANT CHANGE UP (ref 0.2–1.2)
BILIRUB UR-MCNC: NEGATIVE — SIGNIFICANT CHANGE UP
BUN SERPL-MCNC: 17 MG/DL — SIGNIFICANT CHANGE UP (ref 7–23)
CALCIUM SERPL-MCNC: 8.6 MG/DL — SIGNIFICANT CHANGE UP (ref 8.5–10.1)
CHLORIDE SERPL-SCNC: 109 MMOL/L — HIGH (ref 96–108)
CK SERPL-CCNC: 90 U/L — SIGNIFICANT CHANGE UP (ref 26–192)
CO2 SERPL-SCNC: 24 MMOL/L — SIGNIFICANT CHANGE UP (ref 22–31)
COLOR SPEC: YELLOW — SIGNIFICANT CHANGE UP
CREAT SERPL-MCNC: 0.94 MG/DL — SIGNIFICANT CHANGE UP (ref 0.5–1.3)
DIFF PNL FLD: NEGATIVE — SIGNIFICANT CHANGE UP
EOSINOPHIL # BLD AUTO: 0.16 K/UL — SIGNIFICANT CHANGE UP (ref 0–0.5)
EOSINOPHIL NFR BLD AUTO: 3.6 % — SIGNIFICANT CHANGE UP (ref 0–6)
GLUCOSE SERPL-MCNC: 114 MG/DL — HIGH (ref 70–99)
GLUCOSE UR QL: NEGATIVE MG/DL — SIGNIFICANT CHANGE UP
HCT VFR BLD CALC: 34.1 % — LOW (ref 34.5–45)
HGB BLD-MCNC: 10.3 G/DL — LOW (ref 11.5–15.5)
IMM GRANULOCYTES NFR BLD AUTO: 1.8 % — HIGH (ref 0–1.5)
KETONES UR-MCNC: ABNORMAL
LEUKOCYTE ESTERASE UR-ACNC: ABNORMAL
LYMPHOCYTES # BLD AUTO: 2.18 K/UL — SIGNIFICANT CHANGE UP (ref 1–3.3)
LYMPHOCYTES # BLD AUTO: 49.3 % — HIGH (ref 13–44)
MCHC RBC-ENTMCNC: 24.6 PG — LOW (ref 27–34)
MCHC RBC-ENTMCNC: 30.2 GM/DL — LOW (ref 32–36)
MCV RBC AUTO: 81.6 FL — SIGNIFICANT CHANGE UP (ref 80–100)
MONOCYTES # BLD AUTO: 0.66 K/UL — SIGNIFICANT CHANGE UP (ref 0–0.9)
MONOCYTES NFR BLD AUTO: 14.9 % — HIGH (ref 2–14)
NEUTROPHILS # BLD AUTO: 1.28 K/UL — LOW (ref 1.8–7.4)
NEUTROPHILS NFR BLD AUTO: 29 % — LOW (ref 43–77)
NITRITE UR-MCNC: NEGATIVE — SIGNIFICANT CHANGE UP
PH UR: 6 — SIGNIFICANT CHANGE UP (ref 5–8)
PLATELET # BLD AUTO: 324 K/UL — SIGNIFICANT CHANGE UP (ref 150–400)
POTASSIUM SERPL-MCNC: 3.8 MMOL/L — SIGNIFICANT CHANGE UP (ref 3.5–5.3)
POTASSIUM SERPL-SCNC: 3.8 MMOL/L — SIGNIFICANT CHANGE UP (ref 3.5–5.3)
PROT SERPL-MCNC: 6.8 GM/DL — SIGNIFICANT CHANGE UP (ref 6–8.3)
PROT UR-MCNC: 100 MG/DL
RBC # BLD: 4.18 M/UL — SIGNIFICANT CHANGE UP (ref 3.8–5.2)
RBC # FLD: 18.6 % — HIGH (ref 10.3–14.5)
SODIUM SERPL-SCNC: 138 MMOL/L — SIGNIFICANT CHANGE UP (ref 135–145)
SP GR SPEC: 1.01 — SIGNIFICANT CHANGE UP (ref 1.01–1.02)
TROPONIN I SERPL-MCNC: <0.015 NG/ML — SIGNIFICANT CHANGE UP (ref 0.01–0.04)
UROBILINOGEN FLD QL: NEGATIVE MG/DL — SIGNIFICANT CHANGE UP
WBC # BLD: 4.42 K/UL — SIGNIFICANT CHANGE UP (ref 3.8–10.5)
WBC # FLD AUTO: 4.42 K/UL — SIGNIFICANT CHANGE UP (ref 3.8–10.5)

## 2021-06-20 PROCEDURE — 84100 ASSAY OF PHOSPHORUS: CPT

## 2021-06-20 PROCEDURE — 97116 GAIT TRAINING THERAPY: CPT | Mod: GP

## 2021-06-20 PROCEDURE — 80061 LIPID PANEL: CPT

## 2021-06-20 PROCEDURE — 93306 TTE W/DOPPLER COMPLETE: CPT

## 2021-06-20 PROCEDURE — 74176 CT ABD & PELVIS W/O CONTRAST: CPT

## 2021-06-20 PROCEDURE — 99285 EMERGENCY DEPT VISIT HI MDM: CPT

## 2021-06-20 PROCEDURE — 87040 BLOOD CULTURE FOR BACTERIA: CPT

## 2021-06-20 PROCEDURE — 36415 COLL VENOUS BLD VENIPUNCTURE: CPT

## 2021-06-20 PROCEDURE — 71045 X-RAY EXAM CHEST 1 VIEW: CPT

## 2021-06-20 PROCEDURE — 92523 SPEECH SOUND LANG COMPREHEN: CPT | Mod: GN

## 2021-06-20 PROCEDURE — 86803 HEPATITIS C AB TEST: CPT

## 2021-06-20 PROCEDURE — 87635 SARS-COV-2 COVID-19 AMP PRB: CPT

## 2021-06-20 PROCEDURE — 85025 COMPLETE CBC W/AUTO DIFF WBC: CPT

## 2021-06-20 PROCEDURE — 70551 MRI BRAIN STEM W/O DYE: CPT

## 2021-06-20 PROCEDURE — 97530 THERAPEUTIC ACTIVITIES: CPT | Mod: GP

## 2021-06-20 PROCEDURE — 70450 CT HEAD/BRAIN W/O DYE: CPT | Mod: 26,ME

## 2021-06-20 PROCEDURE — 86769 SARS-COV-2 COVID-19 ANTIBODY: CPT

## 2021-06-20 PROCEDURE — 80053 COMPREHEN METABOLIC PANEL: CPT

## 2021-06-20 PROCEDURE — 97162 PT EVAL MOD COMPLEX 30 MIN: CPT | Mod: GP

## 2021-06-20 PROCEDURE — 92610 EVALUATE SWALLOWING FUNCTION: CPT | Mod: GN

## 2021-06-20 PROCEDURE — G1004: CPT

## 2021-06-20 PROCEDURE — 83036 HEMOGLOBIN GLYCOSYLATED A1C: CPT

## 2021-06-20 PROCEDURE — 95816 EEG AWAKE AND DROWSY: CPT

## 2021-06-20 PROCEDURE — 71045 X-RAY EXAM CHEST 1 VIEW: CPT | Mod: 26

## 2021-06-20 PROCEDURE — 83735 ASSAY OF MAGNESIUM: CPT

## 2021-06-20 PROCEDURE — 82962 GLUCOSE BLOOD TEST: CPT

## 2021-06-20 PROCEDURE — 93010 ELECTROCARDIOGRAM REPORT: CPT

## 2021-06-20 RX ORDER — SODIUM CHLORIDE 9 MG/ML
1000 INJECTION INTRAMUSCULAR; INTRAVENOUS; SUBCUTANEOUS ONCE
Refills: 0 | Status: COMPLETED | OUTPATIENT
Start: 2021-06-20 | End: 2021-06-20

## 2021-06-20 RX ORDER — CEFTRIAXONE 500 MG/1
1000 INJECTION, POWDER, FOR SOLUTION INTRAMUSCULAR; INTRAVENOUS ONCE
Refills: 0 | Status: DISCONTINUED | OUTPATIENT
Start: 2021-06-20 | End: 2021-06-20

## 2021-06-20 RX ORDER — CEFTRIAXONE 500 MG/1
1000 INJECTION, POWDER, FOR SOLUTION INTRAMUSCULAR; INTRAVENOUS ONCE
Refills: 0 | Status: COMPLETED | OUTPATIENT
Start: 2021-06-20 | End: 2021-06-20

## 2021-06-20 RX ADMIN — SODIUM CHLORIDE 1000 MILLILITER(S): 9 INJECTION INTRAMUSCULAR; INTRAVENOUS; SUBCUTANEOUS at 23:00

## 2021-06-20 RX ADMIN — CEFTRIAXONE 1000 MILLIGRAM(S): 500 INJECTION, POWDER, FOR SOLUTION INTRAMUSCULAR; INTRAVENOUS at 23:00

## 2021-06-20 NOTE — ED ADULT NURSE NOTE - CHIEF COMPLAINT QUOTE
Pt presents to the ED with AMS x 45 min-1 hour. As per son, pt has hx of ovarian cancer being treated at [facility]. Pt with sudden onset confusion and "was not making sense." Pt is A+Ox3 at this time, c/o "fogginess." Speech is clear, no facial droop present. Denies numbness/tingling, weakness/blurred vision. As per son pt has had a recent change in medications. Doctor Lizeth assessed pt and no stroke activation at this time.  pt will be sent to [facility] for further evaluation.

## 2021-06-20 NOTE — ED ADULT TRIAGE NOTE - CHIEF COMPLAINT QUOTE
Pt presents to the ED with AMS x 45 min-1 hour. As per son, pt has hx of ovarian cancer being treated at [facility]. Pt with sudden onset confusion and "was not making sense." Pt is A+Ox3 at this time, c/o "fogginess." Speech is clear, no facial droop present. Denies numbness/tingling, weakness/blurred vision. As per son pt has had a recent change in medications. Doctor Lizeth assessed pt and no stroke activation at this time.  pt will be sent to [facility] for further evaluation.

## 2021-06-20 NOTE — ED ADULT NURSE NOTE - OBJECTIVE STATEMENT
Patient from home with hx of ovarian cancer on chemo having AMS.  Patient states she feels a little confused and does not really know why she is here or what happened today.  Son at bedside states she is not normally confused.

## 2021-06-20 NOTE — ED PROVIDER NOTE - PMH
Asthma    DVT (deep venous thrombosis)    HLD (hyperlipidemia)    HTN (hypertension)    Ovarian cancer

## 2021-06-20 NOTE — ED PROVIDER NOTE - OBJECTIVE STATEMENT
75 y/o F with PMHx of ovarian CA ([facility] pt, dx in April 2021, 2nd dose of chemo few weeks ago, no radiation), DVT on Xarelto, HTN, HLD, and asthma presents to the ED BIB family for eval of AMS s/p eating dinner tonight. Son reports pt began pointing at remote and speaking "gibberish." Son then walked pt to bed, pt was still confused, son called [facility] and was told to bring pt to the ED for evaluation. Pt unsure why she is in the ED. Reports some +abd discomfort and intermittent +RLE pain since yesterday. Denies dizziness, chest pain, or fever. Son reports this has never happened to the pt before. Allergic to Betadine and tetracycline. PCP: Dr. Kirby Diez. Pt is poor historian 2/2 AMS.

## 2021-06-20 NOTE — ED PROVIDER NOTE - NS_ ATTENDINGSCRIBEDETAILS _ED_A_ED_FT
I, Terry Camp MD,  performed the initial face to face bedside interview with this patient regarding history of present illness, review of symptoms and relevant past medical, social and family history.  I completed an independent physical examination.    The history, relevant review of systems, past medical and surgical history, medical decision making, and physical examination was documented by the scribe in my presence and I attest to the accuracy of the documentation.

## 2021-06-20 NOTE — ED STATDOCS - PROGRESS NOTE DETAILS
Pt. is a 75 yo F with a hx of HTN and ovarian cancer ([facility] patient) presenting with confusion since after dinner today, for the past 2 hours.  Per family, patient was pointing at nail clippers and "talking jibberish".  Patient states she has fatigue and has felt confused today with difficulty communicating appropriately.  Denies headache, falling, focal muscle weakness, numbness.  Family states fingerstick at home was 160s and BP was elevated at home to 170s/90s.  Family called [facility] due to the confusion who advised she go to the ED due to elevated blood pressure, confusion and possibility for infection such as UTI. Patient sent to the Main for evaluation.

## 2021-06-21 ENCOUNTER — TRANSCRIPTION ENCOUNTER (OUTPATIENT)
Age: 76
End: 2021-06-21

## 2021-06-21 PROBLEM — C56.9 MALIGNANT NEOPLASM OF UNSPECIFIED OVARY: Chronic | Status: ACTIVE | Noted: 2021-06-13

## 2021-06-21 PROBLEM — I82.409 ACUTE EMBOLISM AND THROMBOSIS OF UNSPECIFIED DEEP VEINS OF UNSPECIFIED LOWER EXTREMITY: Chronic | Status: ACTIVE | Noted: 2021-06-13

## 2021-06-21 PROBLEM — E78.5 HYPERLIPIDEMIA, UNSPECIFIED: Chronic | Status: ACTIVE | Noted: 2021-06-13

## 2021-06-21 PROBLEM — J45.909 UNSPECIFIED ASTHMA, UNCOMPLICATED: Chronic | Status: ACTIVE | Noted: 2021-06-13

## 2021-06-21 LAB
A1C WITH ESTIMATED AVERAGE GLUCOSE RESULT: 6.7 % — HIGH (ref 4–5.6)
ALBUMIN SERPL ELPH-MCNC: 2.7 G/DL — LOW (ref 3.3–5)
ALP SERPL-CCNC: 84 U/L — SIGNIFICANT CHANGE UP (ref 40–120)
ALT FLD-CCNC: 16 U/L — SIGNIFICANT CHANGE UP (ref 12–78)
ANION GAP SERPL CALC-SCNC: 7 MMOL/L — SIGNIFICANT CHANGE UP (ref 5–17)
AST SERPL-CCNC: 11 U/L — LOW (ref 15–37)
BASOPHILS # BLD AUTO: 0.04 K/UL — SIGNIFICANT CHANGE UP (ref 0–0.2)
BASOPHILS NFR BLD AUTO: 1 % — SIGNIFICANT CHANGE UP (ref 0–2)
BILIRUB SERPL-MCNC: 0.3 MG/DL — SIGNIFICANT CHANGE UP (ref 0.2–1.2)
BUN SERPL-MCNC: 13 MG/DL — SIGNIFICANT CHANGE UP (ref 7–23)
CALCIUM SERPL-MCNC: 8.3 MG/DL — LOW (ref 8.5–10.1)
CHLORIDE SERPL-SCNC: 112 MMOL/L — HIGH (ref 96–108)
CHOLEST SERPL-MCNC: 110 MG/DL — SIGNIFICANT CHANGE UP
CO2 SERPL-SCNC: 23 MMOL/L — SIGNIFICANT CHANGE UP (ref 22–31)
COVID-19 SPIKE DOMAIN AB INTERP: POSITIVE
COVID-19 SPIKE DOMAIN ANTIBODY RESULT: >250 U/ML — HIGH
CREAT SERPL-MCNC: 0.7 MG/DL — SIGNIFICANT CHANGE UP (ref 0.5–1.3)
EOSINOPHIL # BLD AUTO: 0.08 K/UL — SIGNIFICANT CHANGE UP (ref 0–0.5)
EOSINOPHIL NFR BLD AUTO: 2 % — SIGNIFICANT CHANGE UP (ref 0–6)
ESTIMATED AVERAGE GLUCOSE: 146 MG/DL — HIGH (ref 68–114)
GLUCOSE SERPL-MCNC: 98 MG/DL — SIGNIFICANT CHANGE UP (ref 70–99)
HCT VFR BLD CALC: 31.4 % — LOW (ref 34.5–45)
HCV AB S/CO SERPL IA: 0.12 S/CO — SIGNIFICANT CHANGE UP (ref 0–0.99)
HCV AB SERPL-IMP: SIGNIFICANT CHANGE UP
HDLC SERPL-MCNC: 36 MG/DL — LOW
HGB BLD-MCNC: 9.5 G/DL — LOW (ref 11.5–15.5)
LIPID PNL WITH DIRECT LDL SERPL: 54 MG/DL — SIGNIFICANT CHANGE UP
LYMPHOCYTES # BLD AUTO: 1.83 K/UL — SIGNIFICANT CHANGE UP (ref 1–3.3)
LYMPHOCYTES # BLD AUTO: 47 % — HIGH (ref 13–44)
MAGNESIUM SERPL-MCNC: 1.8 MG/DL — SIGNIFICANT CHANGE UP (ref 1.6–2.6)
MCHC RBC-ENTMCNC: 25 PG — LOW (ref 27–34)
MCHC RBC-ENTMCNC: 30.3 GM/DL — LOW (ref 32–36)
MCV RBC AUTO: 82.6 FL — SIGNIFICANT CHANGE UP (ref 80–100)
MONOCYTES # BLD AUTO: 0.39 K/UL — SIGNIFICANT CHANGE UP (ref 0–0.9)
MONOCYTES NFR BLD AUTO: 10 % — SIGNIFICANT CHANGE UP (ref 2–14)
NEUTROPHILS # BLD AUTO: 1.52 K/UL — LOW (ref 1.8–7.4)
NEUTROPHILS NFR BLD AUTO: 38 % — LOW (ref 43–77)
NON HDL CHOLESTEROL: 75 MG/DL — SIGNIFICANT CHANGE UP
NRBC # BLD: SIGNIFICANT CHANGE UP /100 WBCS (ref 0–0)
PHOSPHATE SERPL-MCNC: 3.8 MG/DL — SIGNIFICANT CHANGE UP (ref 2.5–4.5)
PLATELET # BLD AUTO: 284 K/UL — SIGNIFICANT CHANGE UP (ref 150–400)
POTASSIUM SERPL-MCNC: 3.5 MMOL/L — SIGNIFICANT CHANGE UP (ref 3.5–5.3)
POTASSIUM SERPL-SCNC: 3.5 MMOL/L — SIGNIFICANT CHANGE UP (ref 3.5–5.3)
PROT SERPL-MCNC: 5.6 GM/DL — LOW (ref 6–8.3)
RBC # BLD: 3.8 M/UL — SIGNIFICANT CHANGE UP (ref 3.8–5.2)
RBC # FLD: 18.6 % — HIGH (ref 10.3–14.5)
SARS-COV-2 IGG+IGM SERPL QL IA: >250 U/ML — HIGH
SARS-COV-2 IGG+IGM SERPL QL IA: POSITIVE
SARS-COV-2 RNA SPEC QL NAA+PROBE: SIGNIFICANT CHANGE UP
SODIUM SERPL-SCNC: 142 MMOL/L — SIGNIFICANT CHANGE UP (ref 135–145)
TRIGL SERPL-MCNC: 104 MG/DL — SIGNIFICANT CHANGE UP
WBC # BLD: 3.9 K/UL — SIGNIFICANT CHANGE UP (ref 3.8–10.5)
WBC # FLD AUTO: 3.9 K/UL — SIGNIFICANT CHANGE UP (ref 3.8–10.5)

## 2021-06-21 PROCEDURE — 99223 1ST HOSP IP/OBS HIGH 75: CPT

## 2021-06-21 PROCEDURE — 12345: CPT | Mod: NC,GC

## 2021-06-21 PROCEDURE — 74176 CT ABD & PELVIS W/O CONTRAST: CPT | Mod: 26

## 2021-06-21 PROCEDURE — 93306 TTE W/DOPPLER COMPLETE: CPT | Mod: 26

## 2021-06-21 RX ORDER — METOCLOPRAMIDE HCL 10 MG
1 TABLET ORAL
Qty: 0 | Refills: 0 | DISCHARGE

## 2021-06-21 RX ORDER — DEXTROSE 50 % IN WATER 50 %
25 SYRINGE (ML) INTRAVENOUS ONCE
Refills: 0 | Status: DISCONTINUED | OUTPATIENT
Start: 2021-06-21 | End: 2021-06-23

## 2021-06-21 RX ORDER — LOSARTAN POTASSIUM 100 MG/1
50 TABLET, FILM COATED ORAL DAILY
Refills: 0 | Status: DISCONTINUED | OUTPATIENT
Start: 2021-06-21 | End: 2021-06-23

## 2021-06-21 RX ORDER — CEFTRIAXONE 500 MG/1
1000 INJECTION, POWDER, FOR SOLUTION INTRAMUSCULAR; INTRAVENOUS EVERY 24 HOURS
Refills: 0 | Status: DISCONTINUED | OUTPATIENT
Start: 2021-06-21 | End: 2021-06-21

## 2021-06-21 RX ORDER — ATORVASTATIN CALCIUM 80 MG/1
40 TABLET, FILM COATED ORAL AT BEDTIME
Refills: 0 | Status: DISCONTINUED | OUTPATIENT
Start: 2021-06-21 | End: 2021-06-21

## 2021-06-21 RX ORDER — DEXAMETHASONE 0.5 MG/5ML
1 ELIXIR ORAL
Qty: 0 | Refills: 0 | DISCHARGE

## 2021-06-21 RX ORDER — DEXTROSE 50 % IN WATER 50 %
15 SYRINGE (ML) INTRAVENOUS ONCE
Refills: 0 | Status: DISCONTINUED | OUTPATIENT
Start: 2021-06-21 | End: 2021-06-23

## 2021-06-21 RX ORDER — DEXTROSE 50 % IN WATER 50 %
12.5 SYRINGE (ML) INTRAVENOUS ONCE
Refills: 0 | Status: DISCONTINUED | OUTPATIENT
Start: 2021-06-21 | End: 2021-06-23

## 2021-06-21 RX ORDER — ATORVASTATIN CALCIUM 80 MG/1
80 TABLET, FILM COATED ORAL AT BEDTIME
Refills: 0 | Status: DISCONTINUED | OUTPATIENT
Start: 2021-06-21 | End: 2021-06-21

## 2021-06-21 RX ORDER — ASPIRIN/CALCIUM CARB/MAGNESIUM 324 MG
81 TABLET ORAL DAILY
Refills: 0 | Status: DISCONTINUED | OUTPATIENT
Start: 2021-06-22 | End: 2021-06-23

## 2021-06-21 RX ORDER — SODIUM CHLORIDE 9 MG/ML
1000 INJECTION, SOLUTION INTRAVENOUS
Refills: 0 | Status: DISCONTINUED | OUTPATIENT
Start: 2021-06-21 | End: 2021-06-23

## 2021-06-21 RX ORDER — NIFEDIPINE 30 MG
30 TABLET, EXTENDED RELEASE 24 HR ORAL DAILY
Refills: 0 | Status: DISCONTINUED | OUTPATIENT
Start: 2021-06-21 | End: 2021-06-23

## 2021-06-21 RX ORDER — DEXAMETHASONE 0.5 MG/5ML
20 ELIXIR ORAL DAILY
Refills: 0 | Status: DISCONTINUED | OUTPATIENT
Start: 2021-06-21 | End: 2021-06-21

## 2021-06-21 RX ORDER — METOCLOPRAMIDE HCL 10 MG
5 TABLET ORAL DAILY
Refills: 0 | Status: DISCONTINUED | OUTPATIENT
Start: 2021-06-21 | End: 2021-06-23

## 2021-06-21 RX ORDER — FAMOTIDINE 10 MG/ML
40 INJECTION INTRAVENOUS AT BEDTIME
Refills: 0 | Status: DISCONTINUED | OUTPATIENT
Start: 2021-06-21 | End: 2021-06-23

## 2021-06-21 RX ORDER — IBUPROFEN 200 MG
600 TABLET ORAL ONCE
Refills: 0 | Status: COMPLETED | OUTPATIENT
Start: 2021-06-21 | End: 2021-06-21

## 2021-06-21 RX ORDER — ALPRAZOLAM 0.25 MG
0.25 TABLET ORAL ONCE
Refills: 0 | Status: DISCONTINUED | OUTPATIENT
Start: 2021-06-21 | End: 2021-06-21

## 2021-06-21 RX ORDER — INSULIN LISPRO 100/ML
VIAL (ML) SUBCUTANEOUS AT BEDTIME
Refills: 0 | Status: DISCONTINUED | OUTPATIENT
Start: 2021-06-21 | End: 2021-06-23

## 2021-06-21 RX ORDER — INSULIN LISPRO 100/ML
VIAL (ML) SUBCUTANEOUS
Refills: 0 | Status: DISCONTINUED | OUTPATIENT
Start: 2021-06-21 | End: 2021-06-23

## 2021-06-21 RX ORDER — LIDOCAINE 4 G/100G
1 CREAM TOPICAL ONCE
Refills: 0 | Status: COMPLETED | OUTPATIENT
Start: 2021-06-21 | End: 2021-06-21

## 2021-06-21 RX ORDER — GLUCAGON INJECTION, SOLUTION 0.5 MG/.1ML
1 INJECTION, SOLUTION SUBCUTANEOUS ONCE
Refills: 0 | Status: DISCONTINUED | OUTPATIENT
Start: 2021-06-21 | End: 2021-06-23

## 2021-06-21 RX ORDER — ALPRAZOLAM 0.25 MG
0.25 TABLET ORAL ONCE
Refills: 0 | Status: DISCONTINUED | OUTPATIENT
Start: 2021-06-21 | End: 2021-06-22

## 2021-06-21 RX ORDER — LEVOTHYROXINE SODIUM 125 MCG
112 TABLET ORAL DAILY
Refills: 0 | Status: DISCONTINUED | OUTPATIENT
Start: 2021-06-21 | End: 2021-06-23

## 2021-06-21 RX ORDER — GABAPENTIN 400 MG/1
300 CAPSULE ORAL THREE TIMES A DAY
Refills: 0 | Status: DISCONTINUED | OUTPATIENT
Start: 2021-06-21 | End: 2021-06-23

## 2021-06-21 RX ORDER — ATORVASTATIN CALCIUM 80 MG/1
80 TABLET, FILM COATED ORAL AT BEDTIME
Refills: 0 | Status: DISCONTINUED | OUTPATIENT
Start: 2021-06-21 | End: 2021-06-23

## 2021-06-21 RX ORDER — RIVAROXABAN 15 MG-20MG
20 KIT ORAL
Refills: 0 | Status: DISCONTINUED | OUTPATIENT
Start: 2021-06-21 | End: 2021-06-23

## 2021-06-21 RX ORDER — ASPIRIN/CALCIUM CARB/MAGNESIUM 324 MG
325 TABLET ORAL ONCE
Refills: 0 | Status: COMPLETED | OUTPATIENT
Start: 2021-06-21 | End: 2021-06-21

## 2021-06-21 RX ORDER — INSULIN GLARGINE 100 [IU]/ML
30 INJECTION, SOLUTION SUBCUTANEOUS AT BEDTIME
Refills: 0 | Status: DISCONTINUED | OUTPATIENT
Start: 2021-06-21 | End: 2021-06-23

## 2021-06-21 RX ORDER — LOSARTAN POTASSIUM 100 MG/1
60 TABLET, FILM COATED ORAL DAILY
Refills: 0 | Status: DISCONTINUED | OUTPATIENT
Start: 2021-06-21 | End: 2021-06-21

## 2021-06-21 RX ORDER — FAMOTIDINE 10 MG/ML
1 INJECTION INTRAVENOUS
Qty: 0 | Refills: 0 | DISCHARGE

## 2021-06-21 RX ADMIN — LIDOCAINE 1 PATCH: 4 CREAM TOPICAL at 20:35

## 2021-06-21 RX ADMIN — FAMOTIDINE 40 MILLIGRAM(S): 10 INJECTION INTRAVENOUS at 18:32

## 2021-06-21 RX ADMIN — RIVAROXABAN 20 MILLIGRAM(S): KIT at 21:09

## 2021-06-21 RX ADMIN — ATORVASTATIN CALCIUM 80 MILLIGRAM(S): 80 TABLET, FILM COATED ORAL at 21:09

## 2021-06-21 RX ADMIN — Medication 600 MILLIGRAM(S): at 21:09

## 2021-06-21 RX ADMIN — LIDOCAINE 1 PATCH: 4 CREAM TOPICAL at 12:45

## 2021-06-21 RX ADMIN — Medication 325 MILLIGRAM(S): at 02:46

## 2021-06-21 RX ADMIN — GABAPENTIN 300 MILLIGRAM(S): 400 CAPSULE ORAL at 14:26

## 2021-06-21 RX ADMIN — Medication 112 MICROGRAM(S): at 05:26

## 2021-06-21 RX ADMIN — GABAPENTIN 300 MILLIGRAM(S): 400 CAPSULE ORAL at 21:09

## 2021-06-21 RX ADMIN — INSULIN GLARGINE 30 UNIT(S): 100 INJECTION, SOLUTION SUBCUTANEOUS at 21:09

## 2021-06-21 RX ADMIN — Medication 600 MILLIGRAM(S): at 21:40

## 2021-06-21 RX ADMIN — Medication 30 MILLIGRAM(S): at 10:57

## 2021-06-21 RX ADMIN — GABAPENTIN 300 MILLIGRAM(S): 400 CAPSULE ORAL at 05:25

## 2021-06-21 RX ADMIN — Medication 2: at 17:24

## 2021-06-21 RX ADMIN — LOSARTAN POTASSIUM 50 MILLIGRAM(S): 100 TABLET, FILM COATED ORAL at 09:50

## 2021-06-21 RX ADMIN — Medication 600 MILLIGRAM(S): at 05:25

## 2021-06-21 NOTE — H&P ADULT - ATTENDING COMMENTS
75 y/o F 77 y/o F PMHx as noted above including ovarian Ca on chemo no radiation, DVT on Xarelto, HTN, HLD, and asthma presents to  for further evaluation and management of an acute onset of mild confusion and word finding difficulty.     #Confusion/Aphasia  ~admit to Telemetry  ~DDx as noted above TIA/CVA vs recrudescence of CNS symptoms due to underlying infectious encephalopathy/UTI  ~CT head as above  ~neuro checks q4h  ~cont. ASA 81mg po daily  ~cont. statin therapy with Atorvastatin  ~f/u w/ Neurology  ~f/u w/ PT   ~f/u labs as outlined above  ~f/u w/ speech and swallow consult  ~f/u TTE    #Complicated UTI  ~as noted above continue Ceftriaxone  ~f/u PAN C+S  ~cont. IV hydration    #HTN  ~cont. Losartan  ~cont. Nifedipine as ordered     #Ovarian Cancer  ~as noted above patient's last dose of chemotherapy was on June 2nd.  ~cont. Dexamethasone home dose as ordered    #Hyperlipidemia  ~cont. Atorvastatin    #Diabetes Mellitus Type2  ~FS qAC/HS  ~cont. Basal/Bolus insulin regimen    #History of DVT  ~cont. Xarelto as ordered

## 2021-06-21 NOTE — H&P ADULT - NSICDXPASTMEDICALHX_GEN_ALL_CORE_FT
PAST MEDICAL HISTORY:  Asthma     DVT (deep venous thrombosis)     HLD (hyperlipidemia)     HTN (hypertension)     Ovarian cancer

## 2021-06-21 NOTE — CONSULT NOTE ADULT - ASSESSMENT
77 yo woman with PMHx DM, neuropathy, ovarian Ca on Chemo no radiation), DVT on Xarelto, HTN, HLD resents to the ED for confusion and aphasia that resolved. non focal exam, CT of head shows no acute changes. ? TIA  Suggest:  - EKG monitoring on tele  - Continue home ASA 81mg qd  - Atorvastatin 20mg increased to 80mg qd  - f/u speech and swallow consult  - f/u PT consult  -MR head w.wo  -EEG  
77 yo female with PMHx of ovarian Ca (known to [facility] patient,Dr. Love Dxs in April 2021, on Chemo no radiation), DVT on Xarelto, HTN, HLD and asthma presents to the ED for evaluation of mild confusion and word finding difficulty.         #Neuro  -may have had TIA  -MRI brain pending    #Onc  -Carcinoma of mullerian origin On treatment with Carbo/Taxol/Ana María last administered on 06/05/2021  -CT ab noted; needs review at [facility] to assess response to chemo Tx to date.    We will be happy to answer any onc questions on behalf of [facility] and will be in touch with them.      Umair Vasquez MD  Hematology/Oncology  Cell:  [phone]  Office Phone: [phone]  Office Fax:  [phone] [address] [id number]

## 2021-06-21 NOTE — ED ADULT NURSE REASSESSMENT NOTE - NS ED NURSE REASSESS COMMENT FT1
pt and family member states they forgot to inform MD pt wears CPap overnight for JAKUB.  explained that cpap unavailable in Hospital at this time but pt states she can also tolerate nasal cannula and sitting upright @HS.

## 2021-06-21 NOTE — SWALLOW BEDSIDE ASSESSMENT ADULT - SWALLOW EVAL: DIAGNOSIS
1) Pt exhibits functional Oropharyngeal Swallowing abilities for age without Dysphagia or aspiration signs. Odynophagia was denied.  2) Pt is alert and interactive. She is oriented x3+. Pt able to verbalize during communicative probes via intelligible, fluent, linguistically intact, contextually appropriate utterances. No speech-language pathology evident. Pt able to verbalize needs and is at reported communicative baseline.

## 2021-06-21 NOTE — PROGRESS NOTE ADULT - SUBJECTIVE AND OBJECTIVE BOX
76y YO Female with a PMH of Ovarian cancer ([facility], on Chemo last 6/5/21), Asthma, HLD, DVT, HTN who presents with a chief complaint of confusion, described as gibberish speech, difficulty word finding, which occurred on the evening of presentation. They were advised to come to the ED. in the ED, Head CT did not show any acute intracranial disease. UA showed small leukocyte esterase, patient has no urinary symptoms except for urinary dribbling secondary to Ovarian tumor mass effect on bladder. Patient was given 1 dose of Rocephin.       Subjective: Patient was seen and examined by me this morning at bedside. Patient states that her neurological symptoms have resolved and that she does not have any urinary symptoms at this time.     REVIEW OF SYSTEMS:  CONSTITUTIONAL: No weakness, fevers or chills  EYES/ENT: No visual changes;  No vertigo or throat pain   NECK: No pain or stiffness  RESPIRATORY: No cough, wheezing, hemoptysis; No shortness of breath  CARDIOVASCULAR: No chest pain or palpitations  GASTROINTESTINAL: No epigastric pain. No nausea, vomiting; No diarrhea or constipation. + Abdominal pain, sporadic.    GENITOURINARY: No dysuria, frequency or hematuria  NEUROLOGICAL: No numbness or weakness  SKIN: No itching, burning, rashes, or lesions     PHYSICAL EXAM:  Vital Signs Last 24 Hrs  T(C): 36.9 (21 Jun 2021 04:38), Max: 37.3 (20 Jun 2021 23:08)  T(F): 98.4 (21 Jun 2021 04:38), Max: 99.2 (20 Jun 2021 23:08)  HR: 56 (21 Jun 2021 04:38) (56 - 64)  BP: 176/71 (21 Jun 2021 04:38) (176/71 - 187/69)  BP(mean): 107 (21 Jun 2021 02:36) (103 - 107)  RR: 20 (21 Jun 2021 04:38) (14 - 20)  SpO2: 100% (21 Jun 2021 04:38) (97% - 100%)    Constitutional: Pt lying in bed, awake and alert, NAD  HEENT: EOMI, normal hearing, moist mucous membranes  Neck: Soft and supple, no JVD  Respiratory: CTABL, No wheezing, rales or rhonchi  Cardiovascular: S1S2+, RRR, no M/G/R  Gastrointestinal: BS+, soft, NT/ND, no guarding, no rebound  Extremities: No peripheral edema  Vascular: 2+ peripheral pulses  Neurological: AAOx3, no focal deficits  Musculoskeletal: 5/5 strength b/l upper and lower extremities  Skin: Spot on Left side of face.     MEDICATIONS:  MEDICATIONS  (STANDING):  aspirin enteric coated 81 milliGRAM(s) Oral daily  atorvastatin 40 milliGRAM(s) Oral at bedtime  dexAMETHasone     Tablet 20 milliGRAM(s) Oral daily  dextrose 40% Gel 15 Gram(s) Oral once  dextrose 5%. 1000 milliLiter(s) (50 mL/Hr) IV Continuous <Continuous>  dextrose 5%. 1000 milliLiter(s) (100 mL/Hr) IV Continuous <Continuous>  dextrose 50% Injectable 25 Gram(s) IV Push once  dextrose 50% Injectable 12.5 Gram(s) IV Push once  dextrose 50% Injectable 25 Gram(s) IV Push once  famotidine  Oral Tab/Cap - Peds 40 milliGRAM(s) Oral at bedtime  gabapentin 300 milliGRAM(s) Oral three times a day  glucagon  Injectable 1 milliGRAM(s) IntraMuscular once  insulin glargine Injectable (LANTUS) 30 Unit(s) SubCutaneous at bedtime  insulin lispro (ADMELOG) corrective regimen sliding scale   SubCutaneous three times a day before meals  insulin lispro (ADMELOG) corrective regimen sliding scale   SubCutaneous at bedtime  levothyroxine 112 MICROGram(s) Oral daily  losartan 50 milliGRAM(s) Oral daily  NIFEdipine XL 30 milliGRAM(s) Oral daily  rivaroxaban 20 milliGRAM(s) Oral with dinner    MEDICATIONS  (PRN):  metoclopramide 5 milliGRAM(s) Oral daily PRN Nausea      LABS: All Labs Reviewed:                        9.5    3.90  )-----------( 284      ( 21 Jun 2021 07:24 )             31.4     06-21    142  |  112<H>  |  13  ----------------------------<  98  3.5   |  23  |  0.70    Ca    8.3<L>      21 Jun 2021 07:24  Phos  3.8     06-21  Mg     1.8     06-21    TPro  5.6<L>  /  Alb  2.7<L>  /  TBili  0.3  /  DBili  x   /  AST  11<L>  /  ALT  16  /  AlkPhos  84  06-21      CARDIAC MARKERS ( 20 Jun 2021 21:25 )  <0.015 ng/mL / x     / 90 U/L / x     / x          Blood Culture:   I&O's Summary    CAPILLARY BLOOD GLUCOSE      POCT Blood Glucose.: 100 mg/dL (21 Jun 2021 08:01)  POCT Blood Glucose.: 111 mg/dL (20 Jun 2021 21:15)      RADIOLOGY/EKG:  < from: CT Head No Cont (06.20.21 @ 22:37) >    IMPRESSION:    No acute intracranial disease.            VESTA LANTIGUA DO; Attending Radiologist  This document has been electronically signed. Jun 20 2021 10:47PM    < end of copied text >  < from: Xray Chest 1 View-PORTABLE IMMEDIATE (Xray Chest 1 View-PORTABLE IMMEDIATE .) (06.20.21 @ 23:01) >  IMPRESSION: No acute infiltrate.            MANJIT AHN MD; Attending Radiologist  This document has been electronically signed. Jun 21 2021  8:24AM    < end of copied text >     76y YO Female with a PMH of Ovarian cancer ([facility], on Chemo last 6/5/21), Asthma, HLD, DVT, HTN who presents with a chief complaint of confusion, described as gibberish speech, difficulty word finding, which occurred on the evening of presentation. They were advised to come to the ED. in the ED, Head CT did not show any acute intracranial disease. UA showed small leukocyte esterase, patient has no urinary symptoms except for urinary dribbling secondary to Ovarian tumor mass effect on bladder. Patient was given 1 dose of Rocephin.       Subjective: Patient was seen and examined by me this morning at bedside. Patient states that her neurological symptoms have resolved and that she does not have any urinary symptoms at this time. Complaining of Abdominal and Back pain      REVIEW OF SYSTEMS:  CONSTITUTIONAL: No weakness, fevers or chills  EYES/ENT: No visual changes;  No vertigo or throat pain   NECK: No pain or stiffness  RESPIRATORY: No cough, wheezing, hemoptysis; No shortness of breath  CARDIOVASCULAR: No chest pain or palpitations  GASTROINTESTINAL: No epigastric pain. No nausea, vomiting; No diarrhea or constipation. + Abdominal pain, sporadic.    GENITOURINARY: No dysuria, frequency or hematuria  NEUROLOGICAL: No numbness or weakness  SKIN: No itching, burning, rashes, or lesions     PHYSICAL EXAM:  Vital Signs Last 24 Hrs  T(C): 36.9 (21 Jun 2021 04:38), Max: 37.3 (20 Jun 2021 23:08)  T(F): 98.4 (21 Jun 2021 04:38), Max: 99.2 (20 Jun 2021 23:08)  HR: 56 (21 Jun 2021 04:38) (56 - 64)  BP: 176/71 (21 Jun 2021 04:38) (176/71 - 187/69)  BP(mean): 107 (21 Jun 2021 02:36) (103 - 107)  RR: 20 (21 Jun 2021 04:38) (14 - 20)  SpO2: 100% (21 Jun 2021 04:38) (97% - 100%)    Constitutional: Pt lying in bed, awake and alert, NAD  HEENT: EOMI, normal hearing, moist mucous membranes  Neck: Soft and supple, no JVD  Respiratory: CTABL, No wheezing, rales or rhonchi  Cardiovascular: S1S2+, RRR, no M/G/R  Gastrointestinal: BS+, soft, NT/ND, no guarding, no rebound  Extremities: No peripheral edema  Vascular: 2+ peripheral pulses  Neurological: AAOx3, no focal deficits  Musculoskeletal: 5/5 strength b/l upper and lower extremities  Skin: Spot on Left side of face.     MEDICATIONS:  MEDICATIONS  (STANDING):  aspirin enteric coated 81 milliGRAM(s) Oral daily  atorvastatin 40 milliGRAM(s) Oral at bedtime  dexAMETHasone     Tablet 20 milliGRAM(s) Oral daily  dextrose 40% Gel 15 Gram(s) Oral once  dextrose 5%. 1000 milliLiter(s) (50 mL/Hr) IV Continuous <Continuous>  dextrose 5%. 1000 milliLiter(s) (100 mL/Hr) IV Continuous <Continuous>  dextrose 50% Injectable 25 Gram(s) IV Push once  dextrose 50% Injectable 12.5 Gram(s) IV Push once  dextrose 50% Injectable 25 Gram(s) IV Push once  famotidine  Oral Tab/Cap - Peds 40 milliGRAM(s) Oral at bedtime  gabapentin 300 milliGRAM(s) Oral three times a day  glucagon  Injectable 1 milliGRAM(s) IntraMuscular once  insulin glargine Injectable (LANTUS) 30 Unit(s) SubCutaneous at bedtime  insulin lispro (ADMELOG) corrective regimen sliding scale   SubCutaneous three times a day before meals  insulin lispro (ADMELOG) corrective regimen sliding scale   SubCutaneous at bedtime  levothyroxine 112 MICROGram(s) Oral daily  losartan 50 milliGRAM(s) Oral daily  NIFEdipine XL 30 milliGRAM(s) Oral daily  rivaroxaban 20 milliGRAM(s) Oral with dinner    MEDICATIONS  (PRN):  metoclopramide 5 milliGRAM(s) Oral daily PRN Nausea      LABS: All Labs Reviewed:                        9.5    3.90  )-----------( 284      ( 21 Jun 2021 07:24 )             31.4     06-21    142  |  112<H>  |  13  ----------------------------<  98  3.5   |  23  |  0.70    Ca    8.3<L>      21 Jun 2021 07:24  Phos  3.8     06-21  Mg     1.8     06-21    TPro  5.6<L>  /  Alb  2.7<L>  /  TBili  0.3  /  DBili  x   /  AST  11<L>  /  ALT  16  /  AlkPhos  84  06-21      CARDIAC MARKERS ( 20 Jun 2021 21:25 )  <0.015 ng/mL / x     / 90 U/L / x     / x          Blood Culture:   I&O's Summary    CAPILLARY BLOOD GLUCOSE      POCT Blood Glucose.: 100 mg/dL (21 Jun 2021 08:01)  POCT Blood Glucose.: 111 mg/dL (20 Jun 2021 21:15)      RADIOLOGY/EKG:  < from: CT Head No Cont (06.20.21 @ 22:37) >    IMPRESSION:    No acute intracranial disease.            VESTA LANTIGUA DO; Attending Radiologist  This document has been electronically signed. Jun 20 2021 10:47PM    < end of copied text >  < from: Xray Chest 1 View-PORTABLE IMMEDIATE (Xray Chest 1 View-PORTABLE IMMEDIATE .) (06.20.21 @ 23:01) >  IMPRESSION: No acute infiltrate.            MANJIT AHN MD; Attending Radiologist  This document has been electronically signed. Jun 21 2021  8:24AM    < end of copied text >

## 2021-06-21 NOTE — H&P ADULT - NSHPPHYSICALEXAM_GEN_ALL_CORE
Vital Signs Last 24 Hrs  T(C): 36.4 (21 Jun 2021 02:36), Max: 37.3 (20 Jun 2021 23:08)  T(F): 97.6 (21 Jun 2021 02:36), Max: 99.2 (20 Jun 2021 23:08)  HR: 62 (21 Jun 2021 02:36) (59 - 64)  BP: 181/78 (21 Jun 2021 02:36) (181/78 - 187/69)  BP(mean): 107 (21 Jun 2021 02:36) (103 - 107)  RR: 20 (21 Jun 2021 02:36) (14 - 20)  SpO2: 97% (21 Jun 2021 02:36) (97% - 98%)

## 2021-06-21 NOTE — PROGRESS NOTE ADULT - ASSESSMENT
77 YO F with a PMH of Ovarian Ca ([facility], Last Chemo 6/5/21), DVT (Xarelto), HTN, HLD, Asthma presents for mild confusion, aphasia for 15-20 minutes    #Confusion/Aphasia  - Possibly 2/2 TIA/CVA  - Tele monitoring  - CT Head, no intracranial disease  - Neuro Check Q6  - Continue ASA 81, Atorvastatin 80  - Neuro Consult Appreciated  - Speech & Swallow Consult Appreciated  - PT Consult Appreciated   - Follow up MRI, TTE  - AM Labs WNL    #Abnomal UA  - S/P Rocephin   - Vital Signs WNL  - Asypmtomatic. DC.   - Follow up Urine Culture, Blood Culture    #HTN  - Continue Losartan, Nifedipine  - Baseline /70, Continue to Monitor    #Ovarian Cancer   - on Chemo, last early June.   - Continue Dexamethasone Home dose    #HLD  - Continue Atorvastatin 80    #DM2  - Continue Lantus 30 QHS  - ISS  - DM Diet     #DVT PPX  - Xarelto    #Code Status  - Full Code    #Dispo  - Pending Neuro eval, MRI, TTE.      77 YO F with a PMH of Ovarian Ca ([facility], Last Chemo 6/5/21), DVT (Xarelto), HTN, HLD, Asthma presents for mild confusion, aphasia for 15-20 minutes    #Confusion/Aphasia  - Possibly 2/2 TIA/CVA  - Tele monitoring  - CT Head, no intracranial disease  - Neuro Check Q6  - Continue ASA 81, Atorvastatin 80  - Neuro Consult Appreciated. EEG to be performed   - Speech & Swallow Consult Appreciated  - PT Consult Appreciated   - Follow up MRI, TTE  - AM Labs WNL    #Abnomal UA  - S/P Rocephin   - Vital Signs WNL  - Asypmtomatic. DC.   - Follow up Urine Culture, Blood Culture    #HTN  - Continue Losartan, Nifedipine  - Baseline /70, Continue to Monitor    #Ovarian Cancer   - on Chemo, last early June.   - Continue Dexamethasone Home dose    #HLD  - Continue Atorvastatin 80    #DM2  - Continue Lantus 30 QHS  - ISS  - DM Diet     #DVT PPX  - Xarelto    #Code Status  - Full Code    #Dispo  - Pending Neuro eval, MRI, TTE.      77 YO F with a PMH of Ovarian Ca ([facility], Last Chemo 6/5/21), DVT (Xarelto), HTN, HLD, Asthma presents for mild confusion, aphasia for 15-20 minutes    #Confusion/Aphasia  - Possibly 2/2 TIA/CVA  - Tele monitoring  - CT Head, no intracranial disease  - Neuro Check Q6  - Continue ASA 81, Atorvastatin 80  - Neuro Consult Appreciated. EEG to be performed   - Speech & Swallow Consult Appreciated  - PT Consult Appreciated   - Follow up MRI, TTE  - AM Labs WNL    #Abnomal UA  - S/P Rocephin   - Vital Signs WNL  - Asypmtomatic. DC.   - Follow up Urine Culture, Blood Culture  - Follow up CT Abdomen + Pelvis for abdominal pain.     #HTN  - Continue Losartan, Nifedipine  - Baseline /70, Continue to Monitor    #Ovarian Cancer   - on Chemo, last early June.   - Continue Dexamethasone Home dose    #HLD  - Continue Atorvastatin 80    #DM2  - Continue Lantus 30 QHS  - ISS  - DM Diet     #DVT PPX  - Xarelto    #Code Status  - Full Code    #Dispo  - Pending Neuro eval, MRI, TTE.

## 2021-06-21 NOTE — PATIENT PROFILE ADULT - NS PRO AD NO ADVANCE DIRECTIVE
Patient states she believes she has a HCP but would like to fill out the information in the morning.

## 2021-06-21 NOTE — H&P ADULT - NSHPREVIEWOFSYSTEMS_GEN_ALL_CORE
REVIEW OF SYSTEMS:    CONSTITUTIONAL: No weakness, fevers or chills  EYES/ENT: No visual changes;  No vertigo or throat pain   NECK: No pain or stiffness  RESPIRATORY: No cough, wheezing, hemoptysis; No shortness of breath  CARDIOVASCULAR: No chest pain or palpitations  GASTROINTESTINAL: No abdominal or epigastric pain. No nausea, vomiting, or hematemesis; No diarrhea or constipation. No melena or hematochezia.  GENITOURINARY: No dysuria, frequency or hematuria.  NEUROLOGICAL: No numbness or weakness  SKIN: No itching, rashes

## 2021-06-21 NOTE — H&P ADULT - ASSESSMENT
77 yo female with PMHx of ovarian Ca ([facility] patient, Dxs in April 2021, on Chemo no radiation), DVT on Xarelto, HTN, HLD and asthma presents to the ED for evaluation of mild confusion and aphasia that resolve after 15-20 min.    #Confusion/Aphasia possibly 2/2 TIA/CVA vs UTI  - Admit to tele  - CT Head: no acute intracranial disease  - Neuro checks q6h  -  77 yo female with PMHx of ovarian Ca ([facility] patient, Dxs in April 2021, on Chemo no radiation), DVT on Xarelto, HTN, HLD and asthma presents to the ED for evaluation of mild confusion and aphasia that resolve after 15-20 min.    #Confusion/Aphasia  - possibly 2/2 TIA/CVA vs UTI  - Admit to tele  - CT Head: no acute intracranial disease  - Neuro checks q6h  - Continue home ASA 81mg qd  - Atorvastatin 20mg increased to 80mg qd  - f/u Neuro consult  - f/u AM cbc, cmp, lipid profile, A1c  - f/u speech and swallow consult  - f/u PT consult  - f/u TTE    #UTI  - UA on admission: small leukocyte esterase, few bacteria and 6-10 WB  - s/p Rocephin 1gm in the ED  - Continue Rocephin 1gm qd  - f/u UCx and BCx  - f/u AM labs    #HTN  - BP baseline at home 160s/70s  - Continue home Losartan and Nifedipine    #Ovarian Cancer  - on chemotherapy, last chemo June 2nd.  - Continue Dexamethasone home dose    #HLD  - Continue Atorvastatin    #DM2  - Will change home Tresiba 30UI for Lantus 30UI qhs  - ISS    #History of DVT/ DVT ppx  - On Xarelto     #Code status  - Full Code  - Son Felix Abreu  [phone]    *Case discussed with Dr. Gentile       75 yo female with PMHx of ovarian Ca ([facility] patient, Dxs in April 2021, on Chemo no radiation), DVT on Xarelto, HTN, HLD and asthma presents to the ED for evaluation of mild confusion and aphasia that resolve after 15-20 min.    #Confusion/Aphasia  - possibly 2/2 TIA/CVA vs UTI  - Admit to tele  - CT Head: no acute intracranial disease  - Neuro checks q6h  - Continue home ASA 81mg qd  - Atorvastatin 20mg increased to 80mg qd  - f/u Neuro consult  - f/u AM cbc, cmp, lipid profile, A1c  - f/u speech and swallow consult  - f/u PT consult  - f/u TTE    #UTI  - UA on admission: small leukocyte esterase, few bacteria and 6-10 WB  - s/p Rocephin 1gm in the ED  - Continue Rocephin 1gm qd  - f/u UCx and BCx  - f/u AM labs    #HTN  - BP baseline at home 160s/70s  - Continue home Losartan and Nifedipine    #Ovarian Cancer  - on chemotherapy, last chemo June 2nd.  - Continue Dexamethasone home dose    #HLD  - Continue Atorvastatin 40mg qd    #DM2  - Will change home Tresiba 30UI for Lantus 30UI qhs  - ISS    #History of DVT/ DVT ppx  - On Xarelto     #Code status  - Full Code  - Son Felix Abreu  [phone]    *Case discussed with Dr. Gentile

## 2021-06-21 NOTE — SWALLOW BEDSIDE ASSESSMENT ADULT - COMMENTS
Pt was admitted to  with slurred speech and word finding difficulties which are improving. Unclear if pt is s/p a TIA. She has an underlying history of ovarian cancer for which pt is on chemo, DM, HTN, HLD, asthma and prior DVT.

## 2021-06-21 NOTE — SWALLOW BEDSIDE ASSESSMENT ADULT - SWALLOW EVAL: CRITERIA FOR SKILLED INTERVENTION MET
NO NEED FOR SPEECH OR SWALLOWING THERAPY. PT'S SPEECH-LANGUAGE AND OROPHARYNGEAL SWALLOWING ABILITIES ARE FUNCTIONAL/AT USUAL STATE. GIVEN ABOVE, WILL NOT ACTIVELY FOLLOW. RECONSULT PRN SHOULD STATUS CHANGE AND CONDITION WARRANT.

## 2021-06-21 NOTE — DISCHARGE NOTE NURSING/CASE MANAGEMENT/SOCIAL WORK - PATIENT PORTAL LINK FT
You can access the FollowMyHealth Patient Portal offered by [facility] by registering at the following website: [email or web address]. By joining [employer]’s FollowMyHealth portal, you will also be able to view your health information using other applications (apps) compatible with our system.

## 2021-06-21 NOTE — H&P ADULT - HISTORY OF PRESENT ILLNESS
77 yo female with PMHx of ovarian Ca ([facility] patient, Dxs in April 2021, on Chemo no radiation), DVT on Xarelto, HTN, HLD and asthma presents to the ED for evaluation of mild confusion and word finding difficulty.   Patient son mention they were having dinner when she started having some gibberish speech and word finding difficulty, they called [facility] and told them to do a Neuro exam no her (everything was normal), the son check his glucose and BP and both were on patients baseline.  [facility] nurse told them to come to the ED.  Patient denies any urinary frequency, urinary urgency, hematuria, abdominal or suprapubic pain, chest pain, palpitations, dizziness or lightheadedness.  She mentions that she only has Urinary dribbling, but they told her that was secondary to her Ovarian tumor compressing the bladder.     In the ED a CT head did not show any acute changes, UA showed small leukocyte esterase, few bacteria and 6-10 WBC.  Patient was given 1gm Rocephin.

## 2021-06-21 NOTE — CONSULT NOTE ADULT - SUBJECTIVE AND OBJECTIVE BOX
77 yo female with PMHx of ovarian Ca (known to [facility] patient,Jerome Ronquillo Dxs in April 2021, on Chemo no radiation), DVT on Xarelto, HTN, HLD and asthma presents to the ED for evaluation of mild confusion and word finding difficulty.   Patient son mention they were having dinner when she started having some gibberish speech and word finding difficulty, they called [facility] and told them to do a Neuro exam no her (everything was normal), the son check his glucose and BP and both were on patients baseline.  [facility] [place] told them to come to the ED.  Patient denies any urinary frequency, urinary urgency, hematuria, abdominal or suprapubic pain, chest pain, palpitations, dizziness or lightheadedness.  She mentions that she only has Urinary dribbling, but they told her that was secondary to her Ovarian tumor compressing the bladder.       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     PAST SURGICAL HISTORY:  No significant past surgical history.     No pertinent family history in first degree relatives.     No Pertinent Family History in first degree relatives of: Patient.    Social History:  Social History (marital status, living situation, occupation, tobacco use, alcohol and drug use, and sexual history): No tobacco, No, Etoh, No illicit drugs    ALPRAZolam 0.25 milliGRAM(s) Oral once  ALPRAZolam 0.25 milliGRAM(s) Oral once PRN  aspirin enteric coated 81 milliGRAM(s) Oral daily  atorvastatin 80 milliGRAM(s) Oral at bedtime  dextrose 40% Gel 15 Gram(s) Oral once  dextrose 5%. 1000 milliLiter(s) IV Continuous <Continuous>  dextrose 5%. 1000 milliLiter(s) IV Continuous <Continuous>  dextrose 50% Injectable 25 Gram(s) IV Push once  dextrose 50% Injectable 12.5 Gram(s) IV Push once  dextrose 50% Injectable 25 Gram(s) IV Push once  famotidine  Oral Tab/Cap - Peds 40 milliGRAM(s) Oral at bedtime  gabapentin 300 milliGRAM(s) Oral three times a day  glucagon  Injectable 1 milliGRAM(s) IntraMuscular once  insulin glargine Injectable (LANTUS) 30 Unit(s) SubCutaneous at bedtime  insulin lispro (ADMELOG) corrective regimen sliding scale   SubCutaneous three times a day before meals  insulin lispro (ADMELOG) corrective regimen sliding scale   SubCutaneous at bedtime  levothyroxine 112 MICROGram(s) Oral daily  losartan 50 milliGRAM(s) Oral daily  metoclopramide 5 milliGRAM(s) Oral daily PRN  NIFEdipine XL 30 milliGRAM(s) Oral daily  rivaroxaban 20 milliGRAM(s) Oral with dinner      Betadine (Unknown)  tetracycline (Unknown)      ROS otherwise negative     T(C): 36.7 (06-21-21 @ 09:43), Max: 37.3 (06-20-21 @ 23:08)  HR: 60 (06-21-21 @ 09:43) (56 - 64)  BP: 188/79 (06-21-21 @ 09:43) (176/71 - 188/79)  RR: 18 (06-21-21 @ 09:43) (14 - 20)  SpO2: 99% (06-21-21 @ 09:43) (97% - 100%)  PHYSICAL EXAM  Gen:  laying in bed, nad  H:  anicteric, eomi  Ext:  no edema                          9.5    3.90  )-----------( 284      ( 21 Jun 2021 07:24 )             31.4                         10.3   4.42  )-----------( 324      ( 20 Jun 2021 21:25 )             34.1   06-21    142  |  112<H>  |  13  ----------------------------<  98  3.5   |  23  |  0.70    Ca    8.3<L>      21 Jun 2021 07:24  Phos  3.8     06-21  Mg     1.8     06-21    TPro  5.6<L>  /  Alb  2.7<L>  /  TBili  0.3  /  DBili  x   /  AST  11<L>  /  ALT  16  /  AlkPhos  84  06-21      < from: CT Abdomen and Pelvis w/ Oral Cont (06.21.21 @ 13:32) >  IMPRESSION: Complex left ovarian mass with carcinomatosis, compatible with primary ovarian carcinoma. The right omental implant is amenable to CT-guided biopsy for definitive tissue characterization.    < end of copied text >      < from: CT Head No Cont (06.20.21 @ 22:37) >  IMPRESSION:    No acute intracranial disease.    < end of copied text >  
Neurology Consult requested by:   Patient is a 76y old  Female who presents with a chief complaint of Confusion (21 Jun 2021 09:22)     HPI:  77 yo female with PMHx of ovarian Ca ([facility] patient, Dxs in April 2021, on Chemo no radiation), DVT on Xarelto, HTN, HLD, neuropathy, DM and asthma presents to the ED for evaluation of mild confusion and word finding difficulty, brought to hosp by family   PHx of chronic back pain, c/o urinary urgency, no hematuria, + abdominal  pain, No chest pain, or  palpitations, dizziness or lightheadedness.     PAST MEDICAL & SURGICAL HISTORY:  Ovarian cancer    Asthma    HLD (hyperlipidemia)    DVT (deep venous thrombosis)    HTN (hypertension)    No significant past surgical history      FAMILY HISTORY:  No pertinent family history in first degree relatives      Social Hx:  Nonsmoker, no drug or alcohol use  Medications and Allergies ReviewedMEDICATIONS  (STANDING):  aspirin enteric coated 81 milliGRAM(s) Oral daily  atorvastatin 40 milliGRAM(s) Oral at bedtime  dexAMETHasone     Tablet 20 milliGRAM(s) Oral daily  famotidine  Oral Tab/Cap - Peds 40 milliGRAM(s) Oral at bedtime  gabapentin 300 milliGRAM(s) Oral three times a day  glucagon  Injectable 1 milliGRAM(s) IntraMuscular once  insulin glargine Injectable (LANTUS) 30 Unit(s) SubCutaneous at bedtime  insulin lispro (ADMELOG) corrective regimen sliding scale   SubCutaneous three times a day before meals  insulin lispro (ADMELOG) corrective regimen sliding scale   SubCutaneous at bedtime  levothyroxine 112 MICROGram(s) Oral daily  lidocaine   Patch 1 Patch Transdermal once  losartan 50 milliGRAM(s) Oral daily  NIFEdipine XL 30 milliGRAM(s) Oral daily  rivaroxaban 20 milliGRAM(s) Oral with dinner     ROS: Pertinent positives in HPI, all other ROS were reviewed and are negative.      Examination:   Vital Signs Last 24 Hrs  T(C): 36.7 (21 Jun 2021 09:43), Max: 37.3 (20 Jun 2021 23:08)  T(F): 98 (21 Jun 2021 09:43), Max: 99.2 (20 Jun 2021 23:08)  HR: 60 (21 Jun 2021 09:43) (56 - 64)  BP: 188/79 (21 Jun 2021 09:43) (176/71 - 188/79)  BP(mean): 107 (21 Jun 2021 02:36) (103 - 107)  RR: 18 (21 Jun 2021 09:43) (14 - 20)  SpO2: 99% (21 Jun 2021 09:43) (97% - 100%)  General: Cooperative, NAD   NECK: supple, no masses  ENT: Normal hearing   Vascular : no carotid bruits,   Lungs: CTAB  Chest: RRR, no murmurs  Extremities: nontender, no edema  Musculoskeletal: no adventitious movements, no joint stiffness  Skin: no rash    Neurological Examination:  NIHSS:  MS: AOx3. Appropriately interactive, normal affect. Speech fluent w/o paraphasic error, follows commands   CN: PERLL, EOMI, V1-3 sensation intact, face symmetric, hearing intact, palate elevates symmetrically, tongue midline, SCM equal bilaterally  Motor: normal bulk and tone, no tremor, rigidity or bradykinesia.  LEs proximal 4-/5 otherwise 5/5 all over, no pronator drift  Sens: Intact to light touch.    Reflexes: 0/4 all over, downgoing toes b/l  Coord:  No dysmetria, BRISSA intact   Gait: Cannot test    Labs: Reviewed  Complete Blood Count + Automated Diff (06.21.21 @ 07:24)   WBC Count: 3.90 K/uL   RBC Count: 3.80 M/uL   Hemoglobin: 9.5 g/dL   Hematocrit: 31.4 %   Mean Cell Volume: 82.6 fl   Mean Cell Hemoglobin: 25.0 pg   Mean Cell Hemoglobin Conc: 30.3 gm/dL   Red Cell Distrib Width: 18.6 %   Platelet Count - Automated: 284 K/uL Comprehensive Metabolic Panel (06.21.21 @ 07:24)   Sodium, Serum: 142 mmol/L   Potassium, Serum: 3.5 mmol/L   Chloride, Serum: 112 mmol/L   Carbon Dioxide, Serum: 23 mmol/L   Anion Gap, Serum: 7 mmol/L   Blood Urea Nitrogen, Serum: 13 mg/dL   Creatinine, Serum: 0.70 mg/dL   Glucose, Serum: 98 mg/dL   Calcium, Total Serum: 8.3 mg/dL   Protein Total, Serum: 5.6 gm/dL   Albumin, Serum: 2.7 g/dL   Bilirubin Total, Serum: 0.3 mg/dL   Alkaline Phosphatase, Serum: 84 U/L   Aspartate Aminotransferase (AST/SGOT): 11 U/L   Alanine Aminotransferase (ALT/SGPT): 16 U/L   eGFR if Non : 84:           Imaging:   < from: CT Head No Cont (06.20.21 @ 22:37) >    FINDINGS:    The ventricles and sulci are prominent in size, compatible with generalized parenchymal volume loss. No acute intracranial hemorrhage is identified.  There is no extra-axial fluid collection. No mass effect or midline shift is seen.  There is no evidence of acute territorial infarct.  There are periventricular and subcortical white matter hypodensities, which are nonspecific, but likely reflect mild microvascular ischemic disease.  The visualized paranasal sinuses are clear. The mastoid air cells are well aerated.  No acute osseous abnormality is seen.      IMPRESSION:    No acute intracranial disease.    < end of copied text >

## 2021-06-21 NOTE — SWALLOW BEDSIDE ASSESSMENT ADULT - SLP GENERAL OBSERVATIONS
Pt is alert and interactive. She is oriented x3+. Pt able to verbalize during communicative probes via intelligible, fluent, linguistically intact, contextually appropriate utterances. No speech-language pathology evident. Pt able to verbalize needs and is at reported communicative baseline.

## 2021-06-22 ENCOUNTER — TRANSCRIPTION ENCOUNTER (OUTPATIENT)
Age: 76
End: 2021-06-22

## 2021-06-22 LAB
ALBUMIN SERPL ELPH-MCNC: 2.7 G/DL — LOW (ref 3.3–5)
ALP SERPL-CCNC: 96 U/L — SIGNIFICANT CHANGE UP (ref 40–120)
ALT FLD-CCNC: 17 U/L — SIGNIFICANT CHANGE UP (ref 12–78)
ANION GAP SERPL CALC-SCNC: 4 MMOL/L — LOW (ref 5–17)
AST SERPL-CCNC: 13 U/L — LOW (ref 15–37)
BASOPHILS # BLD AUTO: 0.04 K/UL — SIGNIFICANT CHANGE UP (ref 0–0.2)
BASOPHILS NFR BLD AUTO: 1 % — SIGNIFICANT CHANGE UP (ref 0–2)
BILIRUB SERPL-MCNC: 0.4 MG/DL — SIGNIFICANT CHANGE UP (ref 0.2–1.2)
BUN SERPL-MCNC: 11 MG/DL — SIGNIFICANT CHANGE UP (ref 7–23)
CALCIUM SERPL-MCNC: 8.5 MG/DL — SIGNIFICANT CHANGE UP (ref 8.5–10.1)
CHLORIDE SERPL-SCNC: 111 MMOL/L — HIGH (ref 96–108)
CO2 SERPL-SCNC: 27 MMOL/L — SIGNIFICANT CHANGE UP (ref 22–31)
CREAT SERPL-MCNC: 0.72 MG/DL — SIGNIFICANT CHANGE UP (ref 0.5–1.3)
CULTURE RESULTS: NO GROWTH — SIGNIFICANT CHANGE UP
EOSINOPHIL # BLD AUTO: 0.13 K/UL — SIGNIFICANT CHANGE UP (ref 0–0.5)
EOSINOPHIL NFR BLD AUTO: 3 % — SIGNIFICANT CHANGE UP (ref 0–6)
GLUCOSE SERPL-MCNC: 106 MG/DL — HIGH (ref 70–99)
HCT VFR BLD CALC: 34.2 % — LOW (ref 34.5–45)
HGB BLD-MCNC: 10.3 G/DL — LOW (ref 11.5–15.5)
LYMPHOCYTES # BLD AUTO: 2.03 K/UL — SIGNIFICANT CHANGE UP (ref 1–3.3)
LYMPHOCYTES # BLD AUTO: 47 % — HIGH (ref 13–44)
MCHC RBC-ENTMCNC: 24.7 PG — LOW (ref 27–34)
MCHC RBC-ENTMCNC: 30.1 GM/DL — LOW (ref 32–36)
MCV RBC AUTO: 82 FL — SIGNIFICANT CHANGE UP (ref 80–100)
MONOCYTES # BLD AUTO: 0.52 K/UL — SIGNIFICANT CHANGE UP (ref 0–0.9)
MONOCYTES NFR BLD AUTO: 12 % — SIGNIFICANT CHANGE UP (ref 2–14)
NEUTROPHILS # BLD AUTO: 1.59 K/UL — LOW (ref 1.8–7.4)
NEUTROPHILS NFR BLD AUTO: 37 % — LOW (ref 43–77)
NRBC # BLD: SIGNIFICANT CHANGE UP /100 WBCS (ref 0–0)
PLATELET # BLD AUTO: 326 K/UL — SIGNIFICANT CHANGE UP (ref 150–400)
POTASSIUM SERPL-MCNC: 3.8 MMOL/L — SIGNIFICANT CHANGE UP (ref 3.5–5.3)
POTASSIUM SERPL-SCNC: 3.8 MMOL/L — SIGNIFICANT CHANGE UP (ref 3.5–5.3)
PROT SERPL-MCNC: 6.1 GM/DL — SIGNIFICANT CHANGE UP (ref 6–8.3)
RBC # BLD: 4.17 M/UL — SIGNIFICANT CHANGE UP (ref 3.8–5.2)
RBC # FLD: 18.7 % — HIGH (ref 10.3–14.5)
SODIUM SERPL-SCNC: 142 MMOL/L — SIGNIFICANT CHANGE UP (ref 135–145)
SPECIMEN SOURCE: SIGNIFICANT CHANGE UP
WBC # BLD: 4.31 K/UL — SIGNIFICANT CHANGE UP (ref 3.8–10.5)
WBC # FLD AUTO: 4.31 K/UL — SIGNIFICANT CHANGE UP (ref 3.8–10.5)

## 2021-06-22 PROCEDURE — 99239 HOSP IP/OBS DSCHRG MGMT >30: CPT

## 2021-06-22 PROCEDURE — 95816 EEG AWAKE AND DROWSY: CPT | Mod: 26

## 2021-06-22 PROCEDURE — 70551 MRI BRAIN STEM W/O DYE: CPT | Mod: 26

## 2021-06-22 PROCEDURE — 99232 SBSQ HOSP IP/OBS MODERATE 35: CPT

## 2021-06-22 RX ORDER — FAMOTIDINE 10 MG/ML
1 INJECTION INTRAVENOUS
Qty: 0 | Refills: 0 | DISCHARGE

## 2021-06-22 RX ORDER — ATORVASTATIN CALCIUM 80 MG/1
1 TABLET, FILM COATED ORAL
Qty: 0 | Refills: 0 | DISCHARGE

## 2021-06-22 RX ORDER — IBUPROFEN 200 MG
600 TABLET ORAL THREE TIMES A DAY
Refills: 0 | Status: DISCONTINUED | OUTPATIENT
Start: 2021-06-22 | End: 2021-06-23

## 2021-06-22 RX ORDER — ATORVASTATIN CALCIUM 80 MG/1
1 TABLET, FILM COATED ORAL
Qty: 0 | Refills: 0 | DISCHARGE
Start: 2021-06-22

## 2021-06-22 RX ADMIN — Medication 81 MILLIGRAM(S): at 09:23

## 2021-06-22 RX ADMIN — LIDOCAINE 1 PATCH: 4 CREAM TOPICAL at 00:00

## 2021-06-22 RX ADMIN — Medication 30 MILLIGRAM(S): at 09:23

## 2021-06-22 RX ADMIN — GABAPENTIN 300 MILLIGRAM(S): 400 CAPSULE ORAL at 06:05

## 2021-06-22 RX ADMIN — Medication 112 MICROGRAM(S): at 06:05

## 2021-06-22 RX ADMIN — Medication 0.25 MILLIGRAM(S): at 14:54

## 2021-06-22 RX ADMIN — Medication 600 MILLIGRAM(S): at 21:40

## 2021-06-22 RX ADMIN — RIVAROXABAN 20 MILLIGRAM(S): KIT at 17:11

## 2021-06-22 RX ADMIN — Medication 600 MILLIGRAM(S): at 21:16

## 2021-06-22 RX ADMIN — INSULIN GLARGINE 30 UNIT(S): 100 INJECTION, SOLUTION SUBCUTANEOUS at 21:15

## 2021-06-22 RX ADMIN — LOSARTAN POTASSIUM 50 MILLIGRAM(S): 100 TABLET, FILM COATED ORAL at 09:23

## 2021-06-22 RX ADMIN — ATORVASTATIN CALCIUM 80 MILLIGRAM(S): 80 TABLET, FILM COATED ORAL at 21:16

## 2021-06-22 RX ADMIN — FAMOTIDINE 40 MILLIGRAM(S): 10 INJECTION INTRAVENOUS at 17:25

## 2021-06-22 RX ADMIN — GABAPENTIN 300 MILLIGRAM(S): 400 CAPSULE ORAL at 14:09

## 2021-06-22 RX ADMIN — GABAPENTIN 300 MILLIGRAM(S): 400 CAPSULE ORAL at 21:16

## 2021-06-22 NOTE — PROGRESS NOTE ADULT - SUBJECTIVE AND OBJECTIVE BOX
HPI:  75 yo female with PMHx of ovarian Ca ([facility] patient, Dxs in April 2021, on Chemo no radiation), DVT on Xarelto, HTN, HLD and asthma presents to the ED for evaluation of mild confusion and word finding difficulty.         MEDICATIONS  (STANDING):  aspirin enteric coated 81 milliGRAM(s) Oral daily  atorvastatin 80 milliGRAM(s) Oral at bedtime  famotidine  Oral Tab/Cap - Peds 40 milliGRAM(s) Oral at bedtime  gabapentin 300 milliGRAM(s) Oral three times a day  glucagon  Injectable 1 milliGRAM(s) IntraMuscular once  insulin glargine Injectable (LANTUS) 30 Unit(s) SubCutaneous at bedtime  insulin lispro (ADMELOG) corrective regimen sliding scale   SubCutaneous three times a day before meals  insulin lispro (ADMELOG) corrective regimen sliding scale   SubCutaneous at bedtime  levothyroxine 112 MICROGram(s) Oral daily  losartan 50 milliGRAM(s) Oral daily  NIFEdipine XL 30 milliGRAM(s) Oral daily  rivaroxaban 20 milliGRAM(s) Oral with dinner    MEDICATIONS  (PRN):  ALPRAZolam 0.25 milliGRAM(s) Oral once PRN When patient goes to MRI  metoclopramide 5 milliGRAM(s) Oral daily PRN Nausea      Vital Signs Last 24 Hrs  T(C): 36.4 (22 Jun 2021 07:48), Max: 36.5 (21 Jun 2021 20:36)  T(F): 97.6 (22 Jun 2021 07:48), Max: 97.7 (21 Jun 2021 20:36)  HR: 62 (22 Jun 2021 07:48) (62 - 76)  BP: 169/89 (22 Jun 2021 07:48) (159/91 - 169/89)  BP(mean): 109 (22 Jun 2021 07:48) (109 - 109)  RR: 18 (22 Jun 2021 07:48) (18 - 18)  SpO2: 97% (22 Jun 2021 07:48) (97% - 97%)  Neurological Exam:  MS: AOx3. Appropriately interactive, normal affect. Speech fluent, follows commands   CN: PERLL, EOMI, V1-3 sensation intact, face symmetric, hearing intact, palate elevates symmetrically, tongue midline, SCM equal bilaterally  Motor:  5-/5 all over, no pronator drift  Sens: Intact to light touch.    Reflexes: 0/4 all over, downgoing toes b/l    < from: CT Head No Cont (06.20.21 @ 22:37) >    FINDINGS:    The ventricles and sulci are prominent in size, compatible with generalized parenchymal volume loss. No acute intracranial hemorrhage is identified.  There is no extra-axial fluid collection. No mass effect or midline shift is seen.  There is no evidence of acute territorial infarct.  There are periventricular and subcortical white matter hypodensities, which are nonspecific, but likely reflect mild microvascular ischemic disease.  The visualized paranasal sinuses are clear. The mastoid air cells are well aerated.  No acute osseous abnormality is seen.      IMPRESSION:    No acute intracranial disease.    < end of copied text >      < from: EEG Awake or Drowsy (06.22.21 @ 10:10) >   EXAM:  EEG AWAKE AND DROWSY        PROCEDURE DATE:  06/22/2021        INTERPRETATION:  16-channel EEG recording performed for this 76-year-old woman with periods of confusion, rule out seizures.    The background activity consist of bilateral symmetrical occipitally dominant low amplitude 9-10 Hz activity which attenuates with eye opening. Bilateral symmetrical diffuse 15-20, low amplitude activity.  Drowsiness characterized by symmetrical attenuation of the background activity. Intermittent 4-5 Hz low to medium amplitude activity noted frontal central head regions.    Stepped photic stimulation did not demonstrate any abnormalities.    No focal slowing or epileptiform activity noted.    The EKG portion of the record demonstrates a sinus rhythm.    Impression: Normal EEG performed with the patient awake and drowsy.    < end of copied text >

## 2021-06-22 NOTE — PHYSICAL THERAPY INITIAL EVALUATION ADULT - ADDITIONAL COMMENTS
lives in [facility] however is staying indefinitely between 2 sons' homes in [facility] & [place] since ovarian CA diagnosis 4/2021. Son in [place] has a split level home & son in [place] has high ranch home-both include steps for pt to negotiate. pt owns a RW, shower chair.

## 2021-06-22 NOTE — DISCHARGE NOTE PROVIDER - NSDCMRMEDTOKEN_GEN_ALL_CORE_FT
aspirin 81 mg oral tablet: 1 tab(s) orally once a day  atorvastatin 80 mg oral tablet: 1 tab(s) orally once a day (at bedtime)  gabapentin 300 mg oral capsule: 1 cap(s) orally 3 times a day  levothyroxine 112 mcg (0.112 mg) oral tablet: 1 tab(s) orally once a day  losartan: 60 milligram(s) orally once a day  NIFEdipine 30 mg oral tablet, extended release: 1 tab(s) orally once a day  pantoprazole 40 mg oral delayed release tablet: 1 tab(s) orally once a day (in the morning)  Tresiba 100 units/mL subcutaneous solution: 30 unit(s) subcutaneous once (at bedtime)  Xarelto 20 mg oral tablet: 1 tab(s) orally once a day (in the evening)   aspirin 81 mg oral tablet: 1 tab(s) orally once a day  atorvastatin 80 mg oral tablet: 1 tab(s) orally once a day (at bedtime)  gabapentin 300 mg oral capsule: 1 cap(s) orally 3 times a day  Heartburn Relief 20 mg oral tablet: 1 tab(s) orally once a day  levothyroxine 112 mcg (0.112 mg) oral tablet: 1 tab(s) orally once a day  losartan: 60 milligram(s) orally once a day  NIFEdipine 30 mg oral tablet, extended release: 1 tab(s) orally once a day  pantoprazole 40 mg oral delayed release tablet: 1 tab(s) orally once a day (in the morning)  Tresiba 100 units/mL subcutaneous solution: 30 unit(s) subcutaneous once (at bedtime)  Xarelto 20 mg oral tablet: 1 tab(s) orally once a day (in the evening)   aspirin 81 mg oral tablet: 1 tab(s) orally once a day  atorvastatin 80 mg oral tablet: 1 tab(s) orally once a day (at bedtime)  gabapentin 300 mg oral capsule: 1 cap(s) orally 3 times a day  Heartburn Relief 20 mg oral tablet: 1 tab(s) orally once a day  levothyroxine 112 mcg (0.112 mg) oral tablet: 1 tab(s) orally once a day  Lipitor 80 mg oral tablet: 1 tab(s) orally once a day  losartan: 60 milligram(s) orally once a day  NIFEdipine 30 mg oral tablet, extended release: 1 tab(s) orally once a day  pantoprazole 40 mg oral delayed release tablet: 1 tab(s) orally once a day (in the morning)  Tresiba 100 units/mL subcutaneous solution: 30 unit(s) subcutaneous once (at bedtime)  Xarelto 20 mg oral tablet: 1 tab(s) orally once a day (in the evening)

## 2021-06-22 NOTE — DISCHARGE NOTE PROVIDER - NSDCCPCAREPLAN_GEN_ALL_CORE_FT
PRINCIPAL DISCHARGE DIAGNOSIS  Diagnosis: Transient ischemic attack (TIA)  Assessment and Plan of Treatment: During this admission, you were evaluated for a TIA. You were seen by neurology and given a CT Scan of your brain. You were also given an EEG, and echocardiogram which all did not show up with any acute pathology. You were scheduled for an MRI but could not tolerate the MRI.   - PLEASE FOLLLOW UP WITH NEUROLOGY OUTPATIENT  - PLEASE OBTAIN A BRAIN MRI FOR FOLLOW UP  - YOUR ATORVASTATIN WAS CHANGED FROM 20MG TO 80 MG  - PLEASE FOLLOW UP WITH YOUR PRIMARY CARE PROVIDER.      SECONDARY DISCHARGE DIAGNOSES  Diagnosis: Ovarian cancer  Assessment and Plan of Treatment: You were seen by our inpatient heme onc physician regarding your ovarian cancer and a CT Scan of your abdomen was performed due to your abdominal pain.   - PLEASE FOLLOW UP WITH [facility] FOR CONTINUED MANAGEMENT       PRINCIPAL DISCHARGE DIAGNOSIS  Diagnosis: Transient ischemic attack (TIA)  Assessment and Plan of Treatment: During this admission, you were evaluated for a TIA. You were seen by neurology and given a CT Scan of your brain. You were also given an EEG, and echocardiogram which all did not show up with any acute pathology. You were scheduled for an MRI which showed no acute brain pathology.   - PLEASE FOLLLOW UP WITH NEUROLOGY OUTPATIENT  - YOUR ATORVASTATIN WAS CHANGED FROM 20MG TO 80 MG  - PLEASE FOLLOW UP WITH YOUR PRIMARY CARE PROVIDER.      SECONDARY DISCHARGE DIAGNOSES  Diagnosis: Ovarian cancer  Assessment and Plan of Treatment: You were seen by our inpatient heme onc physician regarding your ovarian cancer and a CT Scan of your abdomen was performed due to your abdominal pain.   - PLEASE FOLLOW UP WITH [facility] FOR CONTINUED MANAGEMENT

## 2021-06-22 NOTE — INPATIENT CERTIFICATION FOR MEDICARE PATIENTS - RISKS OF ADVERSE EVENTS
Concern for cardiopulmonary deterioration/Concern for neurologic deterioration/Concern for worsening infectious process/Concern for delay in diagnosis and treatment/Concern for renal deterioration/Other:

## 2021-06-22 NOTE — PHYSICAL THERAPY INITIAL EVALUATION ADULT - PERTINENT HX OF CURRENT PROBLEM, REHAB EVAL
76F with a PMH of Ovarian Ca ([facility], Last Chemo 6/5/21), presents with a chief complaint of confusion, described as gibberish speech, difficulty word finding, which occurred on the evening of presentation.

## 2021-06-22 NOTE — PROGRESS NOTE ADULT - ASSESSMENT
77 yo woman with PMHx DM, neuropathy, ovarian Ca on Chemo no radiation), DVT on Xarelto, HTN, HLD resents to the ED for confusion and aphasia that resolved. non focal exam, CT of head shows no acute changes. ? TIA. EEG is negative for seizure phenomena.  Suggest:  - Continue ASA 81mg qd  - Atorvastatin 20mg increased to 80mg qd  -MR head w.wo

## 2021-06-22 NOTE — PROGRESS NOTE ADULT - SUBJECTIVE AND OBJECTIVE BOX
Pt unable to tolerate MRI brain yesteday    ALPRAZolam 0.25 milliGRAM(s) Oral once PRN  aspirin enteric coated 81 milliGRAM(s) Oral daily  atorvastatin 80 milliGRAM(s) Oral at bedtime  dextrose 40% Gel 15 Gram(s) Oral once  dextrose 5%. 1000 milliLiter(s) IV Continuous <Continuous>  dextrose 5%. 1000 milliLiter(s) IV Continuous <Continuous>  dextrose 50% Injectable 25 Gram(s) IV Push once  dextrose 50% Injectable 12.5 Gram(s) IV Push once  dextrose 50% Injectable 25 Gram(s) IV Push once  famotidine  Oral Tab/Cap - Peds 40 milliGRAM(s) Oral at bedtime  gabapentin 300 milliGRAM(s) Oral three times a day  glucagon  Injectable 1 milliGRAM(s) IntraMuscular once  insulin glargine Injectable (LANTUS) 30 Unit(s) SubCutaneous at bedtime  insulin lispro (ADMELOG) corrective regimen sliding scale   SubCutaneous three times a day before meals  insulin lispro (ADMELOG) corrective regimen sliding scale   SubCutaneous at bedtime  levothyroxine 112 MICROGram(s) Oral daily  losartan 50 milliGRAM(s) Oral daily  metoclopramide 5 milliGRAM(s) Oral daily PRN  NIFEdipine XL 30 milliGRAM(s) Oral daily  rivaroxaban 20 milliGRAM(s) Oral with dinner      Betadine (Unknown)  tetracycline (Unknown)      ROS otherwise negative     T(C): 36.4 (06-22-21 @ 07:48), Max: 36.5 (06-21-21 @ 20:36)  HR: 62 (06-22-21 @ 07:48) (62 - 76)  BP: 169/89 (06-22-21 @ 07:48) (159/91 - 169/89)  RR: 18 (06-22-21 @ 07:48) (18 - 18)  SpO2: 97% (06-22-21 @ 07:48) (97% - 97%)  PHYSICAL EXAM  Gen:  laying in bed, nad  H:  anicteric, eomi  Ext:  no edema                          10.3   4.31  )-----------( 326      ( 22 Jun 2021 06:36 )             34.2                         9.5    3.90  )-----------( 284      ( 21 Jun 2021 07:24 )             31.4                         10.3   4.42  )-----------( 324      ( 20 Jun 2021 21:25 )             34.1   06-22    142  |  111<H>  |  11  ----------------------------<  106<H>  3.8   |  27  |  0.72    Ca    8.5      22 Jun 2021 06:36  Phos  3.8     06-21  Mg     1.8     06-21    TPro  6.1  /  Alb  2.7<L>  /  TBili  0.4  /  DBili  x   /  AST  13<L>  /  ALT  17  /  AlkPhos  96  06-22

## 2021-06-22 NOTE — PHYSICAL THERAPY INITIAL EVALUATION ADULT - GENERAL OBSERVATIONS, REHAB EVAL
pt received in bed on 3E, pt pleasant and cooperative, motivated to participate with PT. no complaints throughout PT session. post session pt left sitting in bedside chair, CBIR, chair alarm on, oriented to call bell system, instructed not to ambulate without assistance, pt verbalized understanding.

## 2021-06-22 NOTE — DISCHARGE NOTE PROVIDER - HOSPITAL COURSE
75 YO Female with a PMH of Ovarian cancer ([facility], on Chemo last 6/5/21), Asthma, HLD, DVT, HTN who presents with a chief complaint of confusion, described as gibberish speech, difficulty word finding, which occurred on the evening of presentation. They were advised to come to the ED. in the ED, Head CT did not show any acute intracranial disease. UA showed small leukocyte esterase, patient has no urinary symptoms except for urinary dribbling secondary to Ovarian tumor mass effect on bladder. Patient was given 1 dose of Rocephin. She was evaluated by neurology, CT head showed no acute pathology, and no neurological intervention was performed in the ED. She was evaluated the next day by neurology, an EEG was performed, and an MRI was ordered, however patient could not tolerate MRI, she was given xanax and is now refusing MRI during this admission.     Subjective: Patient was evaluated this morning and is refusing MRI. She will be discharged, and will have the opportunity to follow up outpatient.     REVIEW OF SYSTEMS:    CONSTITUTIONAL: No weakness, fevers or chills  EYES/ENT: No visual changes;  No vertigo or throat pain   NECK: No pain or stiffness  RESPIRATORY: No cough, wheezing, hemoptysis; No shortness of breath  CARDIOVASCULAR: No chest pain or palpitations  GASTROINTESTINAL: No abdominal or epigastric pain. No nausea, vomiting; No diarrhea or constipation.   GENITOURINARY: No dysuria, frequency or hematuria  NEUROLOGICAL: No numbness or weakness  SKIN: No itching, rashes    PHYSICAL EXAM:  Vital Signs Last 24 Hrs  T(C): 36.4 (22 Jun 2021 07:48), Max: 36.5 (21 Jun 2021 20:36)  T(F): 97.6 (22 Jun 2021 07:48), Max: 97.7 (21 Jun 2021 20:36)  HR: 62 (22 Jun 2021 07:48) (62 - 76)  BP: 169/89 (22 Jun 2021 07:48) (159/91 - 169/89)  BP(mean): 109 (22 Jun 2021 07:48) (109 - 109)  RR: 18 (22 Jun 2021 07:48) (18 - 18)  SpO2: 97% (22 Jun 2021 07:48) (97% - 97%)    Constitutional: Pt lying in bed, awake and alert, NAD  HEENT: EOMI, normal hearing, moist mucous membranes  Neck: Soft and supple, no JVD  Respiratory: CTABL, No wheezing, rales or rhonchi  Cardiovascular: S1S2+, RRR, no M/G/R  Gastrointestinal: BS+, soft, NT/ND, no guarding, no rebound  Extremities: No peripheral edema  Vascular: 2+ peripheral pulses  Neurological: AAOx3, no focal deficits  Musculoskeletal: 5/5 strength b/l upper and lower extremities  Skin: No rashes     77 YO Female with a PMH of Ovarian cancer ([facility], on Chemo last 6/5/21), Asthma, HLD, DVT, HTN who presents with a chief complaint of confusion, described as gibberish speech, difficulty word finding, which occurred on the evening of presentation. They were advised to come to the ED. in the ED, Head CT did not show any acute intracranial disease. UA showed small leukocyte esterase, patient has no urinary symptoms except for urinary dribbling secondary to Ovarian tumor mass effect on bladder. Patient was given 1 dose of Rocephin. She was evaluated by neurology, CT head showed no acute pathology, and no neurological intervention was performed in the ED. She was evaluated the next day by neurology, an EEG was performed, and an MRI was ordered, however patient could not tolerate MRI, she was given xanax and is now refusing MRI during this admission. Discussed with Neurology, will need to follow up with outpatient Neurology.     Subjective: Patient was evaluated this morning and is refusing MRI. She will be discharged, and will have the opportunity to follow up outpatient neurology.     REVIEW OF SYSTEMS:    CONSTITUTIONAL: No weakness, fevers or chills  EYES/ENT: No visual changes;  No vertigo or throat pain   NECK: No pain or stiffness  RESPIRATORY: No cough, wheezing, hemoptysis; No shortness of breath  CARDIOVASCULAR: No chest pain or palpitations  GASTROINTESTINAL: No abdominal or epigastric pain. No nausea, vomiting; No diarrhea or constipation.   GENITOURINARY: No dysuria, frequency or hematuria  NEUROLOGICAL: No numbness or weakness  SKIN: No itching, rashes    PHYSICAL EXAM:  Vital Signs Last 24 Hrs  T(C): 36.4 (22 Jun 2021 07:48), Max: 36.5 (21 Jun 2021 20:36)  T(F): 97.6 (22 Jun 2021 07:48), Max: 97.7 (21 Jun 2021 20:36)  HR: 62 (22 Jun 2021 07:48) (62 - 76)  BP: 169/89 (22 Jun 2021 07:48) (159/91 - 169/89)  BP(mean): 109 (22 Jun 2021 07:48) (109 - 109)  RR: 18 (22 Jun 2021 07:48) (18 - 18)  SpO2: 97% (22 Jun 2021 07:48) (97% - 97%)    Constitutional: Pt lying in bed, awake and alert, NAD  HEENT: EOMI, normal hearing, moist mucous membranes  Neck: Soft and supple, no JVD  Respiratory: CTABL, No wheezing, rales or rhonchi  Cardiovascular: S1S2+, RRR, no M/G/R  Gastrointestinal: BS+, soft, NT/ND, no guarding, no rebound  Extremities: No peripheral edema  Vascular: 2+ peripheral pulses  Neurological: AAOx3, no focal deficits  Musculoskeletal: 5/5 strength b/l upper and lower extremities  Skin: No rashes     76F with a PMH significant for Ovarian cancer ([facility], on Chemo last 6/5/21) presented with complaint of confusion, difficulty speaking and finding words, which occurred on the evening of presentation. Head CT did not show any acute intracranial disease. EEG done was negative. Pt was not able tolerate MRI on multiple attempts with small sedation and now refuses MRI (Discussed with neuro, neuro will follow up with her outpatient). She was evaluated and managed by neurology. Pt is medically stable for discharge home.    Subjective: Patient was evaluated this morning and is refusing MRI. Discussed with neurology, who will f/u with pt outpatient.     REVIEW OF SYSTEMS:    CONSTITUTIONAL: No weakness, fevers or chills  EYES/ENT: No visual changes;  No vertigo or throat pain   NECK: No pain or stiffness  RESPIRATORY: No cough, wheezing, hemoptysis; No shortness of breath  CARDIOVASCULAR: No chest pain or palpitations  GASTROINTESTINAL: No abdominal or epigastric pain. No nausea, vomiting; No diarrhea or constipation.   GENITOURINARY: No dysuria, frequency or hematuria  NEUROLOGICAL: No numbness or weakness  SKIN: No itching, rashes    PHYSICAL EXAM:  Vital Signs Last 24 Hrs  T(C): 36.4 (22 Jun 2021 07:48), Max: 36.5 (21 Jun 2021 20:36)  T(F): 97.6 (22 Jun 2021 07:48), Max: 97.7 (21 Jun 2021 20:36)  HR: 62 (22 Jun 2021 07:48) (62 - 76)  BP: 169/89 (22 Jun 2021 07:48) (159/91 - 169/89)  BP(mean): 109 (22 Jun 2021 07:48) (109 - 109)  RR: 18 (22 Jun 2021 07:48) (18 - 18)  SpO2: 97% (22 Jun 2021 07:48) (97% - 97%)    Constitutional: Pt lying in bed, awake and alert, NAD  HEENT: EOMI, normal hearing, moist mucous membranes  Neck: Soft and supple, no JVD  Respiratory: CTABL, No wheezing, rales or rhonchi  Cardiovascular: S1S2+, RRR, no M/G/R  Gastrointestinal: BS+, soft, NT/ND, no guarding, no rebound  Extremities: No peripheral edema  Vascular: 2+ peripheral pulses  Neurological: AAOx3, no focal deficits  Musculoskeletal: 5/5 strength b/l upper and lower extremities  Skin: No rashes    < from: CT Head No Cont (06.20.21 @ 22:37) >    IMPRESSION:    No acute intracranial disease.    < end of copied text >      < from: CT Abdomen and Pelvis w/ Oral Cont (06.21.21 @ 13:32) >    IMPRESSION: Complex left ovarian mass with carcinomatosis, compatible with primary ovarian carcinoma. The right omental implant is amenable to CT-guided biopsy for definitive tissue characterization.    < end of copied text >       76F with a PMH significant for Ovarian cancer ([facility], on Chemo last 6/5/21) presented with complaint of confusion, difficulty speaking and finding words, which occurred on the evening of presentation. Head CT did not show any acute intracranial disease. EEG done was negative. Pt was not able tolerate MRI the first time, but was able to tolerate with Xanax on board.. She was evaluated and managed by neurology. Pt is medically stable for discharge home.    Subjective: Patient was evaluated this morning, blood pressure elevated. Discussed with neurology, who will f/u with pt outpatient.     REVIEW OF SYSTEMS:    CONSTITUTIONAL: No weakness, fevers or chills  EYES/ENT: No visual changes;  No vertigo or throat pain   NECK: No pain or stiffness  RESPIRATORY: No cough, wheezing, hemoptysis; No shortness of breath  CARDIOVASCULAR: No chest pain or palpitations  GASTROINTESTINAL: No abdominal or epigastric pain. No nausea, vomiting; No diarrhea or constipation.   GENITOURINARY: No dysuria, frequency or hematuria  NEUROLOGICAL: No numbness or weakness  SKIN: No itching, rashes    PHYSICAL EXAM:  Vital Signs Last 24 Hrs  T(C): 36.1 (23 Jun 2021 08:25), Max: 36.3 (22 Jun 2021 21:03)  T(F): 97 (23 Jun 2021 08:25), Max: 97.4 (22 Jun 2021 21:03)  HR: 58 (23 Jun 2021 08:25) (58 - 84)  BP: 176/72 (23 Jun 2021 08:25) (160/79 - 176/72)  BP(mean): --  RR: 18 (23 Jun 2021 08:25) (18 - 18)  SpO2: 99% (23 Jun 2021 08:25) (98% - 99%)    Constitutional: Pt lying in bed, awake and alert, NAD  HEENT: EOMI, normal hearing, moist mucous membranes  Neck: Soft and supple, no JVD  Respiratory: CTABL, No wheezing, rales or rhonchi  Cardiovascular: S1S2+, RRR, no M/G/R  Gastrointestinal: BS+, soft, NT/ND, no guarding, no rebound  Extremities: No peripheral edema  Vascular: 2+ peripheral pulses  Neurological: AAOx3, no focal deficits  Musculoskeletal: 5/5 strength b/l upper and lower extremities  Skin: No rashes        < from: CT Head No Cont (06.20.21 @ 22:37) >    IMPRESSION:    No acute intracranial disease.    < end of copied text >      < from: CT Abdomen and Pelvis w/ Oral Cont (06.21.21 @ 13:32) >    IMPRESSION: Complex left ovarian mass with carcinomatosis, compatible with primary ovarian carcinoma. The right omental implant is amenable to CT-guided biopsy for definitive tissue characterization.    < end of copied text >    `< from: MR Head No Cont (06.22.21 @ 16:00) >    IMPRESSION:  1. No acute or subacute stroke.  2. Mild to moderate chronic white matter small vessel ischemic disease.            JEANETTE MORAN MD; Attending Radiologist  This document has been electronically signed. Jun 22 2021  8:23PM    < end of copied text >

## 2021-06-22 NOTE — PROGRESS NOTE ADULT - ASSESSMENT
75 yo female with PMHx of ovarian Ca (known to [facility] patient,Dr. Love Dxs in April 2021, on Chemo no radiation), DVT on Xarelto, HTN, HLD and asthma presents to the ED for evaluation of mild confusion and word finding difficulty.         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    We will be happy to answer any onc questions on behalf of [facility] and will be in touch with them.      Umair Vasquez MD  Hematology/Oncology  Cell:  [phone]  Office Phone: [phone]  Office Fax:  [phone] [address] [place] [address]

## 2021-06-22 NOTE — PHYSICAL THERAPY INITIAL EVALUATION ADULT - PLANNED THERAPY INTERVENTIONS, PT EVAL
GOAL: Pt will perform 10 stairs with U HR as needed independently within 4weeks./balance training/gait training/strengthening/transfer training

## 2021-06-22 NOTE — DISCHARGE NOTE PROVIDER - NSDCCAREPROVSEEN_GEN_ALL_CORE_FT
Jose, Anabel Ortega, Anirudh Best, Juancarlos WADE Jose, Anabel Ortega, Anirudh Best, Juancarlos Marshall, Aamir WADE

## 2021-06-22 NOTE — DISCHARGE NOTE PROVIDER - CARE PROVIDER_API CALL
Guido Lopez  INTERNAL MEDICINE  [address], [address]  [place] [address]  Phone: [phone]  Fax: [phone]  Follow Up Time:     Kirby Diez)  [facility]  [address], [address]  [place] [address]  Phone: [phone]  Fax: [phone]  Follow Up Time:

## 2021-06-22 NOTE — DISCHARGE NOTE PROVIDER - NSDCHC_MEDRECSTATUS_GEN_ALL_CORE
Admission Reconciliation is Completed  Discharge Reconciliation is Completed [General Appearance - In No Acute Distress] : in no acute distress [General Appearance - Alert] : alert [General Appearance - Well Nourished] : well nourished [Oriented To Time, Place, And Person] : oriented to person, place, and time [General Appearance - Well Developed] : well developed [General Appearance - Well-Appearing] : healthy appearing [Place] : oriented to place [Person] : oriented to person [Time] : oriented to time [Short Term Intact] : short term memory intact [Concentration Intact] : normal concentrating ability [Cranial Nerves Optic (II)] : visual acuity intact bilaterally,  pupils equal round and reactive to light [Fluency] : fluency intact [Cranial Nerves Oculomotor (III)] : extraocular motion intact [Cranial Nerves Facial (VII)] : face symmetrical [Cranial Nerves Hypoglossal (XII)] : there was no tongue deviation with protrusion [Motor Tone] : muscle tone was normal in all four extremities [Motor Strength] : muscle strength was normal in all four extremities [Sensation Tactile Decrease] : light touch was intact [Abnormal Walk] : normal gait [___] : absent on the right [___] : absent on the left [FreeTextEntry6] : UE and LE 5/5 bilaterally [FreeTextEntry9] : no Youngblood's bilaterally Admission Reconciliation is Completed  Discharge Reconciliation is Not Complete

## 2021-06-23 VITALS — HEART RATE: 65 BPM | DIASTOLIC BLOOD PRESSURE: 64 MMHG | SYSTOLIC BLOOD PRESSURE: 145 MMHG

## 2021-06-23 PROCEDURE — 99239 HOSP IP/OBS DSCHRG MGMT >30: CPT

## 2021-06-23 RX ORDER — FAMOTIDINE 10 MG/ML
1 INJECTION INTRAVENOUS
Qty: 0 | Refills: 0 | DISCHARGE
Start: 2021-06-23

## 2021-06-23 RX ORDER — ATORVASTATIN CALCIUM 80 MG/1
1 TABLET, FILM COATED ORAL
Qty: 0 | Refills: 0 | DISCHARGE
Start: 2021-06-23

## 2021-06-23 RX ORDER — ATORVASTATIN CALCIUM 80 MG/1
1 TABLET, FILM COATED ORAL
Qty: 30 | Refills: 0
Start: 2021-06-23

## 2021-06-23 RX ADMIN — Medication 600 MILLIGRAM(S): at 13:37

## 2021-06-23 RX ADMIN — Medication 112 MICROGRAM(S): at 05:55

## 2021-06-23 RX ADMIN — Medication 81 MILLIGRAM(S): at 09:26

## 2021-06-23 RX ADMIN — LOSARTAN POTASSIUM 50 MILLIGRAM(S): 100 TABLET, FILM COATED ORAL at 09:26

## 2021-06-23 RX ADMIN — Medication 30 MILLIGRAM(S): at 09:26

## 2021-06-23 RX ADMIN — GABAPENTIN 300 MILLIGRAM(S): 400 CAPSULE ORAL at 05:55

## 2021-06-23 RX ADMIN — GABAPENTIN 300 MILLIGRAM(S): 400 CAPSULE ORAL at 14:08

## 2021-06-23 NOTE — PROGRESS NOTE ADULT - ASSESSMENT
77 YO F with a PMH of Ovarian Ca ([facility], Last Chemo 6/5/21), DVT (Xarelto), HTN, HLD, Asthma presents for mild confusion, aphasia for 15-20 minutes    #Confusion/Aphasia  - Possibly 2/2 TIA/CVA  - Tele monitoring  - CT Head, no intracranial disease  - Neuro Check Q6  - Continue ASA 81, Atorvastatin 80  - Neuro Consult Appreciated.  - Speech & Swallow Consult Appreciated  - PT Consult Appreciated   - No acute pathology on TTE and MRI Brain   - AM Labs WNL    - Abnormal UA  - S/P Rocephin   - Vital Signs WNL  - Asypmtomatic. DC.   - Follow up Urine Culture, Blood Culture  - Follow up CT Abdomen + Pelvis for abdominal pain.     #HTN  - Continue Losartan, Nifedipine  - Baseline /70, Continue to Monitor    #Ovarian Cancer   - on Chemo, last early June.   - Continue Dexamethasone Home dose    #HLD  - Continue Atorvastatin 80    #DM2  - Continue Lantus 30 QHS  - ISS  - DM Diet     #DVT PPX  - Xarelto    #Code Status  - Full Code    #Dispo  - DC Today, follow up outpatient neuro     77 YO F with a PMH of Ovarian Ca ([facility], Last Chemo 6/5/21), DVT (Xarelto), HTN, HLD, Asthma presents for mild confusion, aphasia for 15-20 minutes    #Confusion/Aphasia  - Possibly 2/2 TIA/CVA  - Continue ASA 81, Atorvastatin 80  - Neuro Consult Appreciated.  - Speech & Swallow Consult Appreciated  - PT Consult Appreciated   - No acute pathology on TTE and MRI Brain   - CT Head, no intracranial disease  - AM Labs WNL  - Stable for D/C today with outpatient follow up    #HTN  - Continue Losartan, Nifedipine  - Baseline /70, Continue to Monitor    #Ovarian Cancer   - on Chemo, last early June.   - Continue Dexamethasone Home dose    #HLD  - Continue Atorvastatin 80    #DM2  - Continue Lantus 30 QHS  - ISS  - DM Diet     #DVT PPX  - Xarelto    #Code Status  - Full Code    #Dispo  - DC Today, follow up outpatient neuro

## 2021-06-23 NOTE — PROGRESS NOTE ADULT - ATTENDING COMMENTS
75 YO F with a PMH of Ovarian Ca ([facility], Last Chemo 6/5/21), DVT (Xarelto), HTN, HLD, Asthma presents for mild confusion, aphasia for 15-20 minutes    #Confusion/Aphasia  - Possibly 2/2 TIA/CVA  - Continue ASA 81, Atorvastatin 80  - Neuro Consult Appreciated.  - Speech & Swallow Consult Appreciated  - PT Consult Appreciated   - No acute pathology on TTE and MRI Brain   - CT Head, no intracranial disease  - AM Labs WNL  - Stable for D/C today with outpatient follow up
-rs-aeeb, cta  -p/a-soft, bs+ slight tender on left midquadrant abdominal area  -cvs-s1s2 normal     A/P    #ct to monitor, MRI to follow

## 2021-06-23 NOTE — PROGRESS NOTE ADULT - ASSESSMENT
75 yo female with PMHx of ovarian Ca (known to [facility] patient,Dr. Love Dxs in April 2021, on Chemo no radiation), DVT on Xarelto, HTN, HLD and asthma presents to the ED for evaluation of mild confusion and word finding difficulty.         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    We will be happy to answer any onc questions on behalf of [facility] and will be in touch with them.    Patient planned for discharge today   folllow up with Dr. MONCADA on discharge.

## 2021-06-23 NOTE — PROGRESS NOTE ADULT - SUBJECTIVE AND OBJECTIVE BOX
INTERVAL HPI/OVERNIGHT EVENTS:  Patient S&E at bedside. No o/n events, patient resting comfortably. No complaints at this time. Patient denies fever, chills, dizziness, weakness, CP, palpitations, SOB, cough, N/V/D/C, dysuria, changes in bowel movements, LE edema.    VITAL SIGNS:  T(F): 97 (06-23-21 @ 08:25)  HR: 65 (06-23-21 @ 11:50)  BP: 145/64 (06-23-21 @ 11:50)  RR: 18 (06-23-21 @ 08:25)  SpO2: 99% (06-23-21 @ 08:25)  Wt(kg): --    PHYSICAL EXAM:    Constitutional: NAD  Eyes: EOMI, sclera non-icteric  Neck: supple, no masses, no JVD  Respiratory: CTA b/l, good air entry b/l  Cardiovascular: RRR, no M/R/G  Gastrointestinal: soft, NTND, no masses palpable, + BS, no hepatosplenomegaly  Extremities: no c/c/e  Neurological: AAOx3      MEDICATIONS  (STANDING):  aspirin enteric coated 81 milliGRAM(s) Oral daily  atorvastatin 80 milliGRAM(s) Oral at bedtime  dextrose 40% Gel 15 Gram(s) Oral once  dextrose 5%. 1000 milliLiter(s) (50 mL/Hr) IV Continuous <Continuous>  dextrose 5%. 1000 milliLiter(s) (100 mL/Hr) IV Continuous <Continuous>  dextrose 50% Injectable 25 Gram(s) IV Push once  dextrose 50% Injectable 12.5 Gram(s) IV Push once  dextrose 50% Injectable 25 Gram(s) IV Push once  famotidine  Oral Tab/Cap - Peds 40 milliGRAM(s) Oral at bedtime  gabapentin 300 milliGRAM(s) Oral three times a day  glucagon  Injectable 1 milliGRAM(s) IntraMuscular once  insulin glargine Injectable (LANTUS) 30 Unit(s) SubCutaneous at bedtime  insulin lispro (ADMELOG) corrective regimen sliding scale   SubCutaneous three times a day before meals  insulin lispro (ADMELOG) corrective regimen sliding scale   SubCutaneous at bedtime  levothyroxine 112 MICROGram(s) Oral daily  losartan 50 milliGRAM(s) Oral daily  NIFEdipine XL 30 milliGRAM(s) Oral daily  rivaroxaban 20 milliGRAM(s) Oral with dinner    MEDICATIONS  (PRN):  ibuprofen  Tablet. 600 milliGRAM(s) Oral three times a day PRN Mild Pain (1 - 3), Moderate Pain (4 - 6)  metoclopramide 5 milliGRAM(s) Oral daily PRN Nausea      Allergies    Betadine (Unknown)  tetracycline (Unknown)    Intolerances        LABS:                        10.3   4.31  )-----------( 326      ( 22 Jun 2021 06:36 )             34.2     06-22    142  |  111<H>  |  11  ----------------------------<  106<H>  3.8   |  27  |  0.72    Ca    8.5      22 Jun 2021 06:36    TPro  6.1  /  Alb  2.7<L>  /  TBili  0.4  /  DBili  x   /  AST  13<L>  /  ALT  17  /  AlkPhos  96  06-22          RADIOLOGY & ADDITIONAL TESTS:  < from: MR Head No Cont (06.22.21 @ 16:00) >    EXAM:  MR BRAIN                            PROCEDURE DATE:  06/22/2021          INTERPRETATION:  CLINICAL INFORMATION: Aphasia and confusion. Rule out stroke.    TECHNIQUE: MRI brain with 3 plane survey, sagittal T1, axial DWI, axial ADC map, axialFLAIR, axial T1, axial T2, and axial gradient echo images.    COMPARISON:  CT brain 6/20/2021    FINDINGS: Ventricles and cortical sulci are age-appropriate. There is no intracranial bleed, or extra-axial fluid collection. No mass, mass effect or midline shift seen. No acute or subacute stroke demonstrated on diffusion-weighted imaging. The T2 vascular flow voids of the major intracranial vessels are grossly unremarkable. There is mild to moderate bright T2/FLAIR signal hyperintense foci seen within the periventricular deep and subcortical white matter. Imaged orbits demonstrate thin bilateral ocular lenses. Imaged paranasal sinuses and mastoid air cells are grossly unremarkable. Evaluation of the skull base demonstrates normal position of the cerebellar tonsils.    IMPRESSION:  1. No acute or subacute stroke.  2. Mild to moderate chronic white matter small vessel ischemic disease.            JEANETTE MORAN MD; Attending Radiologist  This document has been electronically signed. Jun 22 2021  8:23PM    < end of copied text >      ASSESSMENT & PLAN:

## 2021-06-23 NOTE — PROGRESS NOTE ADULT - SUBJECTIVE AND OBJECTIVE BOX
76y YO Female with a PMH of Ovarian cancer ([facility], on Chemo last 6/5/21), Asthma, HLD, DVT, HTN who presents with a chief complaint of confusion, described as gibberish speech, difficulty word finding, which occurred on the evening of presentation. They were advised to come to the ED. in the ED, Head CT did not show any acute intracranial disease. UA showed small leukocyte esterase, patient has no urinary symptoms except for urinary dribbling secondary to Ovarian tumor mass effect on bladder. Patient was given 1 dose of Rocephin.     Subjective: Patient was seen and examined by me this morning at bedside. Patient denies any neurological symptoms at this time. She states that she felt better yesterday, she had the MRI.       REVIEW OF SYSTEMS:  CONSTITUTIONAL: No weakness, fevers or chills  EYES/ENT: No visual changes;  No vertigo or throat pain   NECK: No pain or stiffness  RESPIRATORY: No cough, wheezing, hemoptysis; No shortness of breath  CARDIOVASCULAR: No chest pain or palpitations  GASTROINTESTINAL: No epigastric pain. No nausea, vomiting; No diarrhea or constipation. + Abdominal pain, sporadic.    GENITOURINARY: No dysuria, frequency or hematuria  NEUROLOGICAL: No numbness or weakness  SKIN: No itching, burning, rashes, or lesions     PHYSICAL EXAM:  Vital Signs Last 24 Hrs  T(C): 36.1 (23 Jun 2021 08:25), Max: 36.3 (22 Jun 2021 21:03)  T(F): 97 (23 Jun 2021 08:25), Max: 97.4 (22 Jun 2021 21:03)  HR: 58 (23 Jun 2021 08:25) (58 - 84)  BP: 176/72 (23 Jun 2021 08:25) (160/79 - 176/72)  BP(mean): --  RR: 18 (23 Jun 2021 08:25) (18 - 18)  SpO2: 99% (23 Jun 2021 08:25) (98% - 99%)    Constitutional: Pt lying in bed, awake and alert, NAD  HEENT: EOMI, normal hearing, moist mucous membranes  Neck: Soft and supple, no JVD  Respiratory: CTABL, No wheezing, rales or rhonchi  Cardiovascular: S1S2+, RRR, no M/G/R  Gastrointestinal: BS+, soft, NT/ND, no guarding, no rebound  Extremities: No peripheral edema  Vascular: 2+ peripheral pulses  Neurological: AAOx3, no focal deficits  Musculoskeletal: 5/5 strength b/l upper and lower extremities  Skin: No rashes      MEDICATIONS  (STANDING):  aspirin enteric coated 81 milliGRAM(s) Oral daily  atorvastatin 80 milliGRAM(s) Oral at bedtime  dextrose 40% Gel 15 Gram(s) Oral once  dextrose 5%. 1000 milliLiter(s) (50 mL/Hr) IV Continuous <Continuous>  dextrose 5%. 1000 milliLiter(s) (100 mL/Hr) IV Continuous <Continuous>  dextrose 50% Injectable 25 Gram(s) IV Push once  dextrose 50% Injectable 12.5 Gram(s) IV Push once  dextrose 50% Injectable 25 Gram(s) IV Push once  famotidine  Oral Tab/Cap - Peds 40 milliGRAM(s) Oral at bedtime  gabapentin 300 milliGRAM(s) Oral three times a day  glucagon  Injectable 1 milliGRAM(s) IntraMuscular once  insulin glargine Injectable (LANTUS) 30 Unit(s) SubCutaneous at bedtime  insulin lispro (ADMELOG) corrective regimen sliding scale   SubCutaneous three times a day before meals  insulin lispro (ADMELOG) corrective regimen sliding scale   SubCutaneous at bedtime  levothyroxine 112 MICROGram(s) Oral daily  losartan 50 milliGRAM(s) Oral daily  NIFEdipine XL 30 milliGRAM(s) Oral daily  rivaroxaban 20 milliGRAM(s) Oral with dinner    MEDICATIONS  (PRN):  ibuprofen  Tablet. 600 milliGRAM(s) Oral three times a day PRN Mild Pain (1 - 3), Moderate Pain (4 - 6)  metoclopramide 5 milliGRAM(s) Oral daily PRN Nausea        LABS: All Labs Reviewed:                        10.3   4.31  )-----------( 326      ( 22 Jun 2021 06:36 )             34.2   06-22    142  |  111<H>  |  11  ----------------------------<  106<H>  3.8   |  27  |  0.72    Ca    8.5      22 Jun 2021 06:36    TPro  6.1  /  Alb  2.7<L>  /  TBili  0.4  /  DBili  x   /  AST  13<L>  /  ALT  17  /  AlkPhos  96  06-22      CARDIAC MARKERS ( 20 Jun 2021 21:25 )  <0.015 ng/mL / x     / 90 U/L / x     / x          Blood Culture:   I&O's Summary    CAPILLARY BLOOD GLUCOSE      POCT Blood Glucose.: 94 mg/dL (23 Jun 2021 07:48)  POCT Blood Glucose.: 149 mg/dL (22 Jun 2021 21:13)  POCT Blood Glucose.: 122 mg/dL (22 Jun 2021 16:38)  POCT Blood Glucose.: 110 mg/dL (22 Jun 2021 11:27)      RADIOLOGY/EKG:    < from: MR Head No Cont (06.22.21 @ 16:00) >  IMPRESSION:  1. No acute or subacute stroke.  2. Mild to moderate chronic white matter small vessel ischemic disease.            JEANETTE MORAN MD; Attending Radiologist  This document has been electronically signed. Jun 22 2021  8:23PM    < end of copied text >    < from: CT Head No Cont (06.20.21 @ 22:37) >    IMPRESSION:    No acute intracranial disease.            VESTA LANTIGUA DO; Attending Radiologist  This document has been electronically signed. Jun 20 2021 10:47PM    < end of copied text >  < from: Xray Chest 1 View-PORTABLE IMMEDIATE (Xray Chest 1 View-PORTABLE IMMEDIATE .) (06.20.21 @ 23:01) >  IMPRESSION: No acute infiltrate.            MANJIT AHN MD; Attending Radiologist  This document has been electronically signed. Jun 21 2021  8:24AM    < end of copied text >

## 2021-06-24 PROBLEM — I10 ESSENTIAL (PRIMARY) HYPERTENSION: Chronic | Status: ACTIVE | Noted: 2021-06-20

## 2021-06-26 LAB
CULTURE RESULTS: SIGNIFICANT CHANGE UP
CULTURE RESULTS: SIGNIFICANT CHANGE UP
SPECIMEN SOURCE: SIGNIFICANT CHANGE UP
SPECIMEN SOURCE: SIGNIFICANT CHANGE UP

## 2021-06-28 ENCOUNTER — APPOINTMENT (OUTPATIENT)
Dept: INTERNAL MEDICINE | Facility: CLINIC | Age: 76
End: 2021-06-28
Payer: MEDICARE

## 2021-06-28 VITALS — BODY MASS INDEX: 36.88 KG/M2 | WEIGHT: 235 LBS | HEIGHT: 67 IN

## 2021-06-28 VITALS — SYSTOLIC BLOOD PRESSURE: 140 MMHG | DIASTOLIC BLOOD PRESSURE: 70 MMHG

## 2021-06-28 PROCEDURE — 99214 OFFICE O/P EST MOD 30 MIN: CPT

## 2021-06-28 NOTE — PHYSICAL EXAM
[No Acute Distress] : no acute distress [No JVD] : no jugular venous distention [No Respiratory Distress] : no respiratory distress  [Normal Rate] : normal rate  [Regular Rhythm] : with a regular rhythm

## 2021-06-28 NOTE — ASSESSMENT
[FreeTextEntry1] : Recent suspected TIA–information from her recent hospital stay was reviewed.  She is presently asymptomatic and she was told it is possible it could have also been a reaction from her chemo along with possible dehydration.\par \par Diabetes–her medications were reviewed.  She is presently on Tresiba 30 units daily.  She was told that when she receives a Decadron and her sugars rise she can adjust the Tresiba dose upwards to control it and then she can reduce it once the effect wears off.  She was also told she could use NovoLog to cover herself at mealtimes if it is excessively elevated.  She understands and is managed her diabetes well.\par \par GERD–she has done well since adding Protonix 40 mg daily.  She was also taking Pepcid but stopped it while in the hospital.  She was told she can remain off it if her symptoms are controlled with the Protonix only.\par \par Hypertension–her blood pressure has remained well controlled and she will remain on Procardia XL 60 mg daily.

## 2021-06-28 NOTE — REVIEW OF SYSTEMS
[Fever] : no fever [Chills] : no chills [Fatigue] : fatigue [Chest Pain] : no chest pain [Palpitations] : no palpitations [Shortness Of Breath] : no shortness of breath [Abdominal Pain] : no abdominal pain [Joint Swelling] : no joint swelling [Muscle Weakness] : no muscle weakness [Headache] : no headache [Dizziness] : no dizziness

## 2021-07-01 DIAGNOSIS — N39.0 URINARY TRACT INFECTION, SITE NOT SPECIFIED: ICD-10-CM

## 2021-07-01 DIAGNOSIS — E11.9 TYPE 2 DIABETES MELLITUS WITHOUT COMPLICATIONS: ICD-10-CM

## 2021-07-01 DIAGNOSIS — E78.5 HYPERLIPIDEMIA, UNSPECIFIED: ICD-10-CM

## 2021-07-01 DIAGNOSIS — C56.9 MALIGNANT NEOPLASM OF UNSPECIFIED OVARY: ICD-10-CM

## 2021-07-01 DIAGNOSIS — J45.909 UNSPECIFIED ASTHMA, UNCOMPLICATED: ICD-10-CM

## 2021-07-01 DIAGNOSIS — Z88.1 ALLERGY STATUS TO OTHER ANTIBIOTIC AGENTS STATUS: ICD-10-CM

## 2021-07-01 DIAGNOSIS — I10 ESSENTIAL (PRIMARY) HYPERTENSION: ICD-10-CM

## 2021-07-01 DIAGNOSIS — G45.9 TRANSIENT CEREBRAL ISCHEMIC ATTACK, UNSPECIFIED: ICD-10-CM

## 2021-07-01 DIAGNOSIS — Z79.01 LONG TERM (CURRENT) USE OF ANTICOAGULANTS: ICD-10-CM

## 2021-07-01 DIAGNOSIS — R41.0 DISORIENTATION, UNSPECIFIED: ICD-10-CM

## 2021-07-01 DIAGNOSIS — Z92.21 PERSONAL HISTORY OF ANTINEOPLASTIC CHEMOTHERAPY: ICD-10-CM

## 2021-07-01 DIAGNOSIS — Z86.718 PERSONAL HISTORY OF OTHER VENOUS THROMBOSIS AND EMBOLISM: ICD-10-CM

## 2021-07-01 DIAGNOSIS — R47.01 APHASIA: ICD-10-CM

## 2021-07-06 ENCOUNTER — INPATIENT (INPATIENT)
Facility: HOSPITAL | Age: 76
LOS: 5 days | Discharge: HOME | DRG: 64 | End: 2021-07-12
Attending: INTERNAL MEDICINE | Admitting: INTERNAL MEDICINE
Payer: MEDICARE

## 2021-07-06 VITALS
DIASTOLIC BLOOD PRESSURE: 67 MMHG | HEART RATE: 81 BPM | SYSTOLIC BLOOD PRESSURE: 122 MMHG | HEIGHT: 67 IN | RESPIRATION RATE: 18 BRPM | TEMPERATURE: 98 F

## 2021-07-06 DIAGNOSIS — I63.9 CEREBRAL INFARCTION, UNSPECIFIED: ICD-10-CM

## 2021-07-06 LAB
ALBUMIN SERPL ELPH-MCNC: 3.2 G/DL — LOW (ref 3.3–5)
ALP SERPL-CCNC: 86 U/L — SIGNIFICANT CHANGE UP (ref 40–120)
ALT FLD-CCNC: 27 U/L — SIGNIFICANT CHANGE UP (ref 12–78)
ANION GAP SERPL CALC-SCNC: 12 MMOL/L — SIGNIFICANT CHANGE UP (ref 5–17)
APTT BLD: 28.3 SEC — SIGNIFICANT CHANGE UP (ref 27.5–35.5)
AST SERPL-CCNC: 31 U/L — SIGNIFICANT CHANGE UP (ref 15–37)
BASE EXCESS BLDV CALC-SCNC: -1.1 MMOL/L — SIGNIFICANT CHANGE UP (ref -2–2)
BASOPHILS # BLD AUTO: 0.04 K/UL — SIGNIFICANT CHANGE UP (ref 0–0.2)
BASOPHILS NFR BLD AUTO: 0.8 % — SIGNIFICANT CHANGE UP (ref 0–2)
BILIRUB SERPL-MCNC: 0.6 MG/DL — SIGNIFICANT CHANGE UP (ref 0.2–1.2)
BUN SERPL-MCNC: 29 MG/DL — HIGH (ref 7–23)
CALCIUM SERPL-MCNC: 9 MG/DL — SIGNIFICANT CHANGE UP (ref 8.5–10.1)
CHLORIDE SERPL-SCNC: 107 MMOL/L — SIGNIFICANT CHANGE UP (ref 96–108)
CO2 SERPL-SCNC: 19 MMOL/L — LOW (ref 22–31)
CREAT SERPL-MCNC: 1.09 MG/DL — SIGNIFICANT CHANGE UP (ref 0.5–1.3)
EOSINOPHIL # BLD AUTO: 0.4 K/UL — SIGNIFICANT CHANGE UP (ref 0–0.5)
EOSINOPHIL NFR BLD AUTO: 7.9 % — HIGH (ref 0–6)
GLUCOSE SERPL-MCNC: 117 MG/DL — HIGH (ref 70–99)
HCO3 BLDV-SCNC: 19 MMOL/L — LOW (ref 21–29)
HCT VFR BLD CALC: 34.6 % — SIGNIFICANT CHANGE UP (ref 34.5–45)
HGB BLD-MCNC: 10.9 G/DL — LOW (ref 11.5–15.5)
IMM GRANULOCYTES NFR BLD AUTO: 0.6 % — SIGNIFICANT CHANGE UP (ref 0–1.5)
INR BLD: 1.32 RATIO — HIGH (ref 0.88–1.16)
LYMPHOCYTES # BLD AUTO: 1.67 K/UL — SIGNIFICANT CHANGE UP (ref 1–3.3)
LYMPHOCYTES # BLD AUTO: 33.1 % — SIGNIFICANT CHANGE UP (ref 13–44)
MCHC RBC-ENTMCNC: 25 PG — LOW (ref 27–34)
MCHC RBC-ENTMCNC: 31.5 GM/DL — LOW (ref 32–36)
MCV RBC AUTO: 79.4 FL — LOW (ref 80–100)
MONOCYTES # BLD AUTO: 0.08 K/UL — SIGNIFICANT CHANGE UP (ref 0–0.9)
MONOCYTES NFR BLD AUTO: 1.6 % — LOW (ref 2–14)
NEUTROPHILS # BLD AUTO: 2.83 K/UL — SIGNIFICANT CHANGE UP (ref 1.8–7.4)
NEUTROPHILS NFR BLD AUTO: 56 % — SIGNIFICANT CHANGE UP (ref 43–77)
PCO2 BLDV: 20 MMHG — LOW (ref 35–50)
PH BLDV: 7.59 — HIGH (ref 7.35–7.45)
PLATELET # BLD AUTO: 251 K/UL — SIGNIFICANT CHANGE UP (ref 150–400)
PO2 BLDV: 102 MMHG — HIGH (ref 25–45)
POTASSIUM SERPL-MCNC: 3.9 MMOL/L — SIGNIFICANT CHANGE UP (ref 3.5–5.3)
POTASSIUM SERPL-SCNC: 3.9 MMOL/L — SIGNIFICANT CHANGE UP (ref 3.5–5.3)
PROT SERPL-MCNC: 6.5 GM/DL — SIGNIFICANT CHANGE UP (ref 6–8.3)
PROTHROM AB SERPL-ACNC: 15.1 SEC — HIGH (ref 10.6–13.6)
RBC # BLD: 4.36 M/UL — SIGNIFICANT CHANGE UP (ref 3.8–5.2)
RBC # FLD: 18.5 % — HIGH (ref 10.3–14.5)
SAO2 % BLDV: 99 % — HIGH (ref 67–88)
SODIUM SERPL-SCNC: 138 MMOL/L — SIGNIFICANT CHANGE UP (ref 135–145)
TROPONIN I SERPL-MCNC: <0.015 NG/ML — SIGNIFICANT CHANGE UP (ref 0.01–0.04)
WBC # BLD: 5.05 K/UL — SIGNIFICANT CHANGE UP (ref 3.8–10.5)
WBC # FLD AUTO: 5.05 K/UL — SIGNIFICANT CHANGE UP (ref 3.8–10.5)

## 2021-07-06 PROCEDURE — 83036 HEMOGLOBIN GLYCOSYLATED A1C: CPT

## 2021-07-06 PROCEDURE — 82962 GLUCOSE BLOOD TEST: CPT

## 2021-07-06 PROCEDURE — 97530 THERAPEUTIC ACTIVITIES: CPT | Mod: GP

## 2021-07-06 PROCEDURE — 85027 COMPLETE CBC AUTOMATED: CPT

## 2021-07-06 PROCEDURE — 99285 EMERGENCY DEPT VISIT HI MDM: CPT

## 2021-07-06 PROCEDURE — 93010 ELECTROCARDIOGRAM REPORT: CPT

## 2021-07-06 PROCEDURE — 82607 VITAMIN B-12: CPT

## 2021-07-06 PROCEDURE — 0042T: CPT

## 2021-07-06 PROCEDURE — 80048 BASIC METABOLIC PNL TOTAL CA: CPT

## 2021-07-06 PROCEDURE — 85025 COMPLETE CBC W/AUTO DIFF WBC: CPT

## 2021-07-06 PROCEDURE — 92523 SPEECH SOUND LANG COMPREHEN: CPT | Mod: GN

## 2021-07-06 PROCEDURE — 70498 CT ANGIOGRAPHY NECK: CPT | Mod: 26,MA

## 2021-07-06 PROCEDURE — 82746 ASSAY OF FOLIC ACID SERUM: CPT

## 2021-07-06 PROCEDURE — 99222 1ST HOSP IP/OBS MODERATE 55: CPT

## 2021-07-06 PROCEDURE — 80053 COMPREHEN METABOLIC PANEL: CPT

## 2021-07-06 PROCEDURE — 80061 LIPID PANEL: CPT

## 2021-07-06 PROCEDURE — 83735 ASSAY OF MAGNESIUM: CPT

## 2021-07-06 PROCEDURE — 71045 X-RAY EXAM CHEST 1 VIEW: CPT | Mod: 26

## 2021-07-06 PROCEDURE — 70496 CT ANGIOGRAPHY HEAD: CPT | Mod: 26,MA

## 2021-07-06 PROCEDURE — 84443 ASSAY THYROID STIM HORMONE: CPT

## 2021-07-06 PROCEDURE — 97116 GAIT TRAINING THERAPY: CPT | Mod: GP

## 2021-07-06 PROCEDURE — 97163 PT EVAL HIGH COMPLEX 45 MIN: CPT | Mod: GP

## 2021-07-06 PROCEDURE — 95816 EEG AWAKE AND DROWSY: CPT

## 2021-07-06 PROCEDURE — 92610 EVALUATE SWALLOWING FUNCTION: CPT | Mod: GN

## 2021-07-06 PROCEDURE — 36415 COLL VENOUS BLD VENIPUNCTURE: CPT

## 2021-07-06 RX ORDER — SODIUM CHLORIDE 9 MG/ML
1000 INJECTION, SOLUTION INTRAVENOUS
Refills: 0 | Status: DISCONTINUED | OUTPATIENT
Start: 2021-07-06 | End: 2021-07-12

## 2021-07-06 RX ORDER — ASPIRIN/CALCIUM CARB/MAGNESIUM 324 MG
1 TABLET ORAL
Qty: 0 | Refills: 0 | DISCHARGE

## 2021-07-06 RX ORDER — LOSARTAN POTASSIUM 100 MG/1
1 TABLET, FILM COATED ORAL
Qty: 0 | Refills: 0 | DISCHARGE

## 2021-07-06 RX ORDER — NIFEDIPINE 30 MG
2 TABLET, EXTENDED RELEASE 24 HR ORAL
Qty: 0 | Refills: 0 | DISCHARGE

## 2021-07-06 RX ORDER — DEXTROSE 50 % IN WATER 50 %
25 SYRINGE (ML) INTRAVENOUS ONCE
Refills: 0 | Status: DISCONTINUED | OUTPATIENT
Start: 2021-07-06 | End: 2021-07-12

## 2021-07-06 RX ORDER — RIVAROXABAN 15 MG-20MG
1 KIT ORAL
Qty: 0 | Refills: 0 | DISCHARGE

## 2021-07-06 RX ORDER — ATORVASTATIN CALCIUM 80 MG/1
80 TABLET, FILM COATED ORAL AT BEDTIME
Refills: 0 | Status: DISCONTINUED | OUTPATIENT
Start: 2021-07-06 | End: 2021-07-12

## 2021-07-06 RX ORDER — DEXTROSE 50 % IN WATER 50 %
12.5 SYRINGE (ML) INTRAVENOUS ONCE
Refills: 0 | Status: DISCONTINUED | OUTPATIENT
Start: 2021-07-06 | End: 2021-07-12

## 2021-07-06 RX ORDER — ASPIRIN/CALCIUM CARB/MAGNESIUM 324 MG
81 TABLET ORAL DAILY
Refills: 0 | Status: DISCONTINUED | OUTPATIENT
Start: 2021-07-06 | End: 2021-07-12

## 2021-07-06 RX ORDER — DEXTROSE 50 % IN WATER 50 %
15 SYRINGE (ML) INTRAVENOUS ONCE
Refills: 0 | Status: DISCONTINUED | OUTPATIENT
Start: 2021-07-06 | End: 2021-07-12

## 2021-07-06 RX ORDER — LEVOTHYROXINE SODIUM 125 MCG
112 TABLET ORAL DAILY
Refills: 0 | Status: DISCONTINUED | OUTPATIENT
Start: 2021-07-06 | End: 2021-07-12

## 2021-07-06 RX ORDER — NIFEDIPINE 30 MG
60 TABLET, EXTENDED RELEASE 24 HR ORAL DAILY
Refills: 0 | Status: DISCONTINUED | OUTPATIENT
Start: 2021-07-06 | End: 2021-07-07

## 2021-07-06 RX ORDER — SODIUM CHLORIDE 9 MG/ML
1000 INJECTION, SOLUTION INTRAVENOUS
Refills: 0 | Status: DISCONTINUED | OUTPATIENT
Start: 2021-07-06 | End: 2021-07-07

## 2021-07-06 RX ORDER — LOSARTAN POTASSIUM 100 MG/1
50 TABLET, FILM COATED ORAL DAILY
Refills: 0 | Status: DISCONTINUED | OUTPATIENT
Start: 2021-07-06 | End: 2021-07-12

## 2021-07-06 RX ORDER — GABAPENTIN 400 MG/1
1 CAPSULE ORAL
Qty: 0 | Refills: 0 | DISCHARGE

## 2021-07-06 RX ORDER — INSULIN DEGLUDEC 100 U/ML
30 INJECTION, SOLUTION SUBCUTANEOUS
Qty: 0 | Refills: 0 | DISCHARGE

## 2021-07-06 RX ORDER — GLUCAGON INJECTION, SOLUTION 0.5 MG/.1ML
1 INJECTION, SOLUTION SUBCUTANEOUS ONCE
Refills: 0 | Status: DISCONTINUED | OUTPATIENT
Start: 2021-07-06 | End: 2021-07-12

## 2021-07-06 RX ORDER — INSULIN ASPART 100 [IU]/ML
0 INJECTION, SOLUTION SUBCUTANEOUS
Qty: 0 | Refills: 0 | DISCHARGE

## 2021-07-06 RX ORDER — GABAPENTIN 400 MG/1
300 CAPSULE ORAL THREE TIMES A DAY
Refills: 0 | Status: DISCONTINUED | OUTPATIENT
Start: 2021-07-06 | End: 2021-07-12

## 2021-07-06 RX ORDER — LOSARTAN POTASSIUM 100 MG/1
60 TABLET, FILM COATED ORAL
Qty: 0 | Refills: 0 | DISCHARGE

## 2021-07-06 RX ORDER — PANTOPRAZOLE SODIUM 20 MG/1
40 TABLET, DELAYED RELEASE ORAL
Refills: 0 | Status: DISCONTINUED | OUTPATIENT
Start: 2021-07-06 | End: 2021-07-12

## 2021-07-06 RX ORDER — ONDANSETRON 8 MG/1
4 TABLET, FILM COATED ORAL EVERY 6 HOURS
Refills: 0 | Status: DISCONTINUED | OUTPATIENT
Start: 2021-07-06 | End: 2021-07-12

## 2021-07-06 RX ORDER — LEVOTHYROXINE SODIUM 125 MCG
1 TABLET ORAL
Qty: 0 | Refills: 0 | DISCHARGE

## 2021-07-06 RX ORDER — SENNA PLUS 8.6 MG/1
2 TABLET ORAL AT BEDTIME
Refills: 0 | Status: DISCONTINUED | OUTPATIENT
Start: 2021-07-06 | End: 2021-07-08

## 2021-07-06 RX ORDER — NIFEDIPINE 30 MG
1 TABLET, EXTENDED RELEASE 24 HR ORAL
Qty: 0 | Refills: 0 | DISCHARGE

## 2021-07-06 RX ORDER — PANTOPRAZOLE SODIUM 20 MG/1
1 TABLET, DELAYED RELEASE ORAL
Qty: 0 | Refills: 0 | DISCHARGE

## 2021-07-06 RX ORDER — INSULIN LISPRO 100/ML
VIAL (ML) SUBCUTANEOUS
Refills: 0 | Status: DISCONTINUED | OUTPATIENT
Start: 2021-07-06 | End: 2021-07-12

## 2021-07-06 RX ORDER — RIVAROXABAN 15 MG-20MG
20 KIT ORAL DAILY
Refills: 0 | Status: DISCONTINUED | OUTPATIENT
Start: 2021-07-07 | End: 2021-07-12

## 2021-07-06 RX ADMIN — GABAPENTIN 300 MILLIGRAM(S): 400 CAPSULE ORAL at 22:17

## 2021-07-06 RX ADMIN — Medication 1 MILLIGRAM(S): at 15:12

## 2021-07-06 RX ADMIN — ATORVASTATIN CALCIUM 80 MILLIGRAM(S): 80 TABLET, FILM COATED ORAL at 22:17

## 2021-07-06 RX ADMIN — SODIUM CHLORIDE 83 MILLILITER(S): 9 INJECTION, SOLUTION INTRAVENOUS at 17:57

## 2021-07-06 NOTE — H&P ADULT - NSHPPHYSICALEXAM_GEN_ALL_CORE
PHYSICAL EXAM:    Daily Height in cm: 170.18 (06 Jul 2021 14:23)    Daily     ICU Vital Signs Last 24 Hrs  T(C): 36.6 (06 Jul 2021 14:50), Max: 36.6 (06 Jul 2021 14:23)  T(F): 97.8 (06 Jul 2021 14:50), Max: 97.8 (06 Jul 2021 14:23)  HR: 78 (06 Jul 2021 16:45) (76 - 89)  BP: 160/89 (06 Jul 2021 16:45) (116/61 - 160/89)  BP(mean): 102 (06 Jul 2021 16:45) (72 - 116)  ABP: --  ABP(mean): --  RR: 17 (06 Jul 2021 16:45) (15 - 22)  SpO2: 100% (06 Jul 2021 16:45) (99% - 100%)      Constitutional: AMS appearing  HEENT: Atraumatic, REGIS  Respiratory: Breath Sounds normal, no rhonchi/wheeze  Cardiovascular: N S1S2;   Gastrointestinal: Abdomen soft, non tender, Bowel Sounds present  Extremities: No edema, peripheral pulses present  Neurological: Awake not alert or oriented; aphasic, confused  Skin: Non cellulitic, no rash, ulcers  Lymph Nodes: No lymphadenopathy noted  Back: No CVA tenderness   Musculoskeletal: non tender  Breasts: Deferred  Genitourinary: deferred  Rectal: Deferred

## 2021-07-06 NOTE — ED PROVIDER NOTE - ENMT, MLM
Airway patent, Nasal mucosa clear. Mouth with normal mucosa. Throat has no vesicles, no oropharyngeal exudates and uvula is midline. (+) R sided nasal labial fold flattening

## 2021-07-06 NOTE — ED ADULT TRIAGE NOTE - CHIEF COMPLAINT QUOTE
patient's family states while taking a walk in park with walker patient developed right facial droop, slurred speech and right sided weakness, patient has history of ovarian cancer under chemo therapy, DM, chronic back pain secondary to herniated disc, dr. cruz in to triage to evaluate patient, code stroke called at 1424, BGM in triage 121, [facility] 4

## 2021-07-06 NOTE — CONSULT NOTE ADULT - SUBJECTIVE AND OBJECTIVE BOX
Patient is a 76y old  Female who presents with a chief complaint of Right sided weakness     HPI: Pt is a 76 year old woman with a PMH of metastatic ovarian cancer, followed by [facility], asthma, DVT on Xarelto HTN, HLD, recent TIA s/p admission at  6/20-6/23  She presents to the ED today with acute onset of right sided weakness.   As per the son at bedside, pt was a that the park when she started to c/o right sided weakness, first in the arm and then spreading to the leg and face, pt was unable to walk to the cart. Pt then began to develop dysarthria and expressive aphasia. Pt arrive in the ED and a CODE STROKE was called at 2:23pm and the pt was taken to the CT scanner.       PAST MEDICAL & SURGICAL HISTORY:  Ovarian cancer, metastatic   Asthma  HLD  DVT, on Xarelto   HTN   TIA  Chronic back pain   herniated lumbar discs   No significant past surgical history    FAMILY HISTORY:  No pertinent family history in first degree relatives     Social Hx:  Nonsmoker, no drug or alcohol use    MEDICATIONS  (STANDING):  Home meds reviewed from last discharge     Allergies  Betadine (Unknown)  tetracycline (Unknown)    ROS: Pertinent positives in HPI, all other ROS were reviewed and are negative.      Vital Signs Last 24 Hrs  T(C): 36.6 (06 Jul 2021 14:23), Max: 36.6 (06 Jul 2021 14:23)  T(F): 97.8 (06 Jul 2021 14:23), Max: 97.8 (06 Jul 2021 14:23)  HR: 81 (06 Jul 2021 14:23) (81 - 81)  BP: 122/67 (06 Jul 2021 14:23) (122/67 - 122/67)  BP(mean): --  RR: 18 (06 Jul 2021 14:23) (18 - 18)  SpO2: --        Constitutional: awake and alert.  HEENT: PERRLA, EOMI,   Neck: Supple.  Respiratory: Breath sounds are clear bilaterally  Cardiovascular: S1 and S2, regular / irregular rhythm  Gastrointestinal: soft, nontender  Extremities:  no edema  Vascular: Caritid Bruit - no  Musculoskeletal: no joint swelling/tenderness, no abnormal movements  Skin: No rashes    Neurological exam:  HF: A x O x 3. Appropriately interactive, normal affect. Speech fluent, No Aphasia or paraphasic errors. Naming /repetition intact   CN: REGIS, EOMI, VFF, facial sensation normal, no NLFD, tongue midline, Palate moves equally, SCM equal bilaterally  Motor: No pronator drift, Strength 5/5 in all 4 ext, normal bulk and tone, no tremor, rigidity or bradykinesia.    Sens: Intact to light touch / PP/ VS/ JS    Reflexes: Symmetric and normal . BJ 2+, BR 2+, KJ 2+, AJ 2+, downgoing toes b/l  Coord:  No FNFA, dysmetria, BRISSA intact   Gait/Balance: Normal/Cannot test    NIHSS:          Labs:            06-21 Chol 110 LDL -- HDL 36<L> Trig 104      Radiology report:  - CT head:  - MRI brain  - MRA head/carotids  - EEG    A/P:    - No IV tpa given because…  - ASA/ PLavix  - Statin  - Dysphagia screen  - DVT prophylaxia  - MRI/A brain-carotids  - PT eval  - Speech/swallow eval    Total Critical Care Time spent:   Patient is a 76y old  Female who presents with a chief complaint of Right sided weakness     HPI: Pt is a 76 year old woman with a PMH of metastatic ovarian cancer, followed by [facility], asthma, DVT on Xarelto HTN, HLD, recent TIA s/p admission at  6/20-6/23  She presents to the ED today with acute onset of right sided weakness.   As per the son at bedside, pt was a that the park when she started to c/o back pain, right sided weakness, first in the arm and then spreading to the leg and then facial numbness, pt was unable to walk to the cart. Pt then began to develop dysarthria and expressive aphasia. Pt arrive in the ED and a CODE STROKE was called at 2:23pm and the pt was taken to the CT scanner. CT is negative for acute stroke or hemorrhage, CTA is negative for large vessel occlusion.   Pt was difficult to examine as she was very emotional and either couldn't or didn't want to follow commands. She could not answer any questions, no clear verbal output, no gross facial asymmetry, moving all extremities antigravity, but is moving the left side more than the right, unable to test dysmetria, sensation appears intact. NIHSS 8 at minimum, unable to complete exam. Pt is not a candidate for t-PA as she took her Xarelto and aspirin last night.     PAST MEDICAL & SURGICAL HISTORY:  Ovarian cancer, metastatic   Asthma  HLD  DVT, on Xarelto   HTN   TIA  Chronic back pain   herniated lumbar discs   No significant past surgical history    FAMILY HISTORY:  No pertinent family history in first degree relatives     Social Hx:  Nonsmoker, no drug or alcohol use    MEDICATIONS  (STANDING):  Home meds reviewed from last discharge     Allergies  Betadine (Unknown)  tetracycline (Unknown)    ROS: Pertinent positives in HPI, all other ROS were reviewed and are negative.      Vital Signs Last 24 Hrs  T(C): 36.6 (06 Jul 2021 14:23), Max: 36.6 (06 Jul 2021 14:23)  T(F): 97.8 (06 Jul 2021 14:23), Max: 97.8 (06 Jul 2021 14:23)  HR: 81 (06 Jul 2021 14:23) (81 - 81)  BP: 122/67 (06 Jul 2021 14:23) (122/67 - 122/67)  RR: 18 (06 Jul 2021 14:23) (18 - 18)    Physical Exam:  Constitutional: awake and alert, emotional, crying, not following commands, not answering questions, no clear verbal output   HEENT: PERRLA, EOMI  Neck: Supple  Respiratory: Breath sounds are clear bilaterally  Cardiovascular: S1 and S2, regular rhythm  Gastrointestinal: soft, nontender, obese   Extremities:  no edema  Musculoskeletal: no joint swelling/tenderness, no abnormal movements  Skin: No rashes    Neurological exam:  HF: A x O x 3. Appropriately interactive, normal affect. Speech fluent, No Aphasia or paraphasic errors. Naming /repetition intact   CN: PEARRL, EOMI, tracks to both sides of the bed, but unable to fully assess visual fields, face appears symmetric.    Motor: Moving all extremities antigravity, but moving the left side more   Sens: withdraws to pain X4   Reflexes: 0/4 all over, downgoing toes b/l   Coord:  cannot assess   Gait/Balance: cannot assess     NIHSS: 8- but unable to fully assess     Labs:  07-06    138  |  107  |  29<H>  ----------------------------<  117<H>  3.9   |  19<L>  |  1.09                          10.9   5.05  )-----------( 251      ( 06 Jul 2021 14:39 )             34.6       06-21 Chol 110 LDL -- HDL 36<L> Trig 104      RADIOLOGY:  < from: CT Brain Stroke Protocol (07.06.21 @ 14:33) >  CT PERFUSION:    After the intravenous administration of 50 cc of Omnipaque 300 350 serial thin sections were obtained through the brain for the purposes of evaluating CT perfusion. Raw data was sent to the rapid ischemia view software for postprocessing. There is no core infarct. There are 2 small areas of delayed mean transit time in the left temporal and right occipital regions.    CBF<30% volume: 0 ml  Tmax> 6.0s volume: 17 ml  Mismatch volume: 17 ml  Mismatch ratio: infinite    CTA head and neck:  After the intravenous power injection of 70 of Omnipaque 300 using a bolus solis timing run, serial thin sections were obtained through the intracranial circulation centered at the [id number] on a multi slice CT scanner reformatted with coronal and sagittal 2 D-MIP projections, including 3 D reconstructions using a separate 3D [employer] software workstation.A total of 120 cc of Omnipaque were intravenously injected. 30 cc were discarded .    Examination is limited by motion artifact.    The origins of the carotid and vertebral arteries are normal. The carotid bifurcations are patent. The vertebral arteries are codominant.    The distal vertebral arteries, basilar artery and posterior cerebral arteries are visualized. The left P1 segment is hypoplastic and there is a prominent P-comm identified.    Evaluation of the carotid arteries demonstrate normal appearance to the cervical, petrous cavernous and supraclinoid internal carotid arteries. The anterior cerebral arteries anterior communicating artery and posterior cerebral arteries are visualized.    No large vessel occlusion is identified.    The intracranial venous circulation is unremarkable.    IMPRESSION: Atrophy and small vessel white matter ischemic changes. No change since 6/20/2021. No hemorrhage.  CT perfusion demonstrates 2 small areas at risk for infarction, in the temporal regions bilaterally.  CTA is limited by motion but demonstrates no large vessel occlusion.

## 2021-07-06 NOTE — ED ADULT NURSE NOTE - OBJECTIVE STATEMENT
BIB son s/p episode of facial droop and slurred speech witnessed by daughter in law. Per son pt was walking at park with family using walker and began to c/o back pain when episode started. Denies falls. Pt currently undergoing chemo for ovarian CA with mets. Hx TIA, DM, ovarian CA

## 2021-07-06 NOTE — ED STATDOCS - HIV OFFER
Called patient with sleep study results. (see below).  CPap has been ordered by Dr Moore.  LM on answering machine to call back when able.     Sleep study  RECOMMENDATIONS  Recommend trial of CPAP at fixed pressure for 60mnA5J.  This is a higher pressure and may not be well tolerated.  Patient may require BPAP in future, given HFrEF patient is not currently a candidate for ASV.  Oxygenation improved on optimal PAP therapy but still remained borderline, consider overnight oximetry when compliant with PAP therapy.  Continue to follow with Cardiology.    - Avoid sleeping in a supine position.  - Avoid sedating medications, including narcotics, and alcohol, as these may exacerbate sleep apnea and/or underlying respiratory disorder.  - Avoid driving when drowsy.  
Patient returned call. She states she is not sure she believes she has sleep apnea due to being up almost every hour with diarrhea during the test but she is willing to try the cpap to help treat her sleep apnea.  Will inquire with Dr Moore's office if she needs follow up appointment. She has not gotten her cpap yet but [employer] [facility] has contacted her. She states she will call them back.   
Previously Declined (within the last year)

## 2021-07-06 NOTE — ED ADULT NURSE REASSESSMENT NOTE - NS ED NURSE REASSESS COMMENT FT1
Neuro BRYANNA Reyes and Dr. Camp at [facility], difficulty with NIH scale evaluation as pt not following commands, unable to score. Voluntary movement noted.

## 2021-07-06 NOTE — ED PROVIDER NOTE - CLINICAL SUMMARY MEDICAL DECISION MAKING FREE TEXT BOX
labs, CT, consult neurology and likely admit. labs, CT, consult neurology and likely admit. NIH = 8 labs, CT, consult neurology and likely admit. NIH = 7

## 2021-07-06 NOTE — H&P ADULT - NSHPLABSRESULTS_GEN_ALL_CORE
Lab Results:  CBC  CBC Full  -  ( 06 Jul 2021 14:39 )  WBC Count : 5.05 K/uL  RBC Count : 4.36 M/uL  Hemoglobin : 10.9 g/dL  Hematocrit : 34.6 %  Platelet Count - Automated : 251 K/uL  Mean Cell Volume : 79.4 fl  Mean Cell Hemoglobin : 25.0 pg  Mean Cell Hemoglobin Concentration : 31.5 gm/dL  Auto Neutrophil # : 2.83 K/uL  Auto Lymphocyte # : 1.67 K/uL  Auto Monocyte # : 0.08 K/uL  Auto Eosinophil # : 0.40 K/uL  Auto Basophil # : 0.04 K/uL  Auto Neutrophil % : 56.0 %  Auto Lymphocyte % : 33.1 %  Auto Monocyte % : 1.6 %  Auto Eosinophil % : 7.9 %  Auto Basophil % : 0.8 %    .		Differential:	[] Automated		[] Manual  Chemistry                        10.9   5.05  )-----------( 251      ( 06 Jul 2021 14:39 )             34.6     07-06    138  |  107  |  29<H>  ----------------------------<  117<H>  3.9   |  19<L>  |  1.09    Ca    9.0      06 Jul 2021 14:39    TPro  6.5  /  Alb  3.2<L>  /  TBili  0.6  /  DBili  x   /  AST  31  /  ALT  27  /  AlkPhos  86  07-06    LIVER FUNCTIONS - ( 06 Jul 2021 14:39 )  Alb: 3.2 g/dL / Pro: 6.5 gm/dL / ALK PHOS: 86 U/L / ALT: 27 U/L / AST: 31 U/L / GGT: x           PT/INR - ( 06 Jul 2021 14:39 )   PT: 15.1 sec;   INR: 1.32 ratio         PTT - ( 06 Jul 2021 14:39 )  PTT:28.3 sec      RADIOLOGY RESULTS:    < from: CT Brain Stroke Protocol (07.06.21 @ 14:33) >    IMPRESSION: Atrophy and small vessel white matter ischemic changes. No change since 6/20/2021. No hemorrhage.    CT perfusion demonstrates 2 small areas at risk for infarction, in the temporal regions bilaterally.     CTA is limited by motion but demonstrates no large vessel occlusion.      < end of copied text >    < from: Xray Chest 1 View- PORTABLE-Urgent (07.06.21 @ 14:49) >    IMPRESSION: No evidence for focal infiltrate or lobar consolidation.    < end of copied text >    MEDICATIONS  (STANDING):  aspirin  chewable 81 milliGRAM(s) Oral daily  atorvastatin 80 milliGRAM(s) Oral at bedtime  dextrose 40% Gel 15 Gram(s) Oral once  dextrose 5% + sodium chloride 0.9%. 1000 milliLiter(s) (83 mL/Hr) IV Continuous <Continuous>  dextrose 5%. 1000 milliLiter(s) (50 mL/Hr) IV Continuous <Continuous>  dextrose 5%. 1000 milliLiter(s) (100 mL/Hr) IV Continuous <Continuous>  dextrose 50% Injectable 25 Gram(s) IV Push once  dextrose 50% Injectable 12.5 Gram(s) IV Push once  dextrose 50% Injectable 25 Gram(s) IV Push once  gabapentin 300 milliGRAM(s) Oral three times a day  glucagon  Injectable 1 milliGRAM(s) IntraMuscular once  insulin lispro (ADMELOG) corrective regimen sliding scale   SubCutaneous three times a day before meals  levothyroxine 112 MICROGram(s) Oral daily  losartan 50 milliGRAM(s) Oral daily  NIFEdipine XL 60 milliGRAM(s) Oral daily  pantoprazole    Tablet 40 milliGRAM(s) Oral before breakfast    MEDICATIONS  (PRN):  ondansetron Injectable 4 milliGRAM(s) IV Push every 6 hours PRN Nausea  senna 2 Tablet(s) Oral at bedtime PRN Constipation

## 2021-07-06 NOTE — H&P ADULT - HISTORY OF PRESENT ILLNESS
Pt does not give any history. Hx obtained from pt's son and chart. 76/ F with PMHx of ovarian CA ([facility] patient, Dxs in April 2021, on Chemo no radiation), DVT on Xarelto, HTN, HLD and asthma , DM, chronic back pain secondary to herniated disc presents to the ED c/o facial droop, slurred speech and Right sided weakness. Per pt's son, pt was outside with son when pt c/o R sided weakness on lower and upper extremities in addition to R sided facial droop. Son brought mom to hospital immediately. Pt is on Xarelto since pt had similar symptoms and was evaluated for a TIA 2 weeks ago and saw Dr. Lopez who put her on an anticoagulant. Pt took Xarelto this morning as the most recent dose. Currently states that there is a slight improvement of extremities. Pt was seen y neuro and deemed to be tPA candidate as on xarelto.

## 2021-07-06 NOTE — ED PROVIDER NOTE - PROGRESS NOTE DETAILS
Samantha Camp: pt is not a TPA candidate since she si on Xarelto. neuro PA confirms with son that pt is on Xarelto and agrees.

## 2021-07-06 NOTE — H&P ADULT - ASSESSMENT
Pt does not give any history. Hx obtained from pt's son and chart. 76/ F with PMHx of ovarian CA ([facility] patient, Dxs in April 2021, on Chemo no radiation), DVT on Xarelto, HTN, HLD and asthma , DM, chronic back pain secondary to herniated disc presents to the ED c/o facial droop, slurred speech and Right sided weakness. Per pt's son, pt was outside with son when pt c/o R sided weakness on lower and upper extremities in addition to R sided facial droop. Son brought mom to hospital immediately. Pt is on Xarelto since pt had similar symptoms and was evaluated for a TIA 2 weeks ago and saw Dr. Lopez who put her on an anticoagulant. Pt took Xarelto this morning as the most recent dose. Currently states that there is a slight improvement of extremities. Pt was seen y neuro and deemed to be tPA candidate as on xarelto.     Pt admitted with     1) Aphasia + right sided weakness and facial droop r/o CVA :  admit to tele  cont asa, xarelto, statin  neuro consult appreciated  fall/aspiration precautions  MR brain with iv contrast  speech and swallow consults  PT consult    2) HTN: cont nifedipine    3) HLD: statin    4) DVt PPX: on Xarelto    poc discussed with pt's son.

## 2021-07-06 NOTE — PATIENT PROFILE ADULT - NSPROPASSIVESMOKEEXPOSURE_GEN_A_NUR
Pt tele alarm rang out low rate, went into pt room and pt was agonal breathing with O2 sats dropping. Daughter and granddaughter at bedside. At 1840, no radial pulse was felt, no heart sounds heard. Charge nurse, hospice, and house supervisor notified.    Unknown

## 2021-07-06 NOTE — ED STATDOCS - PROGRESS NOTE DETAILS
Samantha Ruiz for attending Dr. Pelayo: 77 y/o female with a PMHx of asthma, DVT, HTN, HLD, ovarian cancer presents to the ED c/o right sided weakness starting 30 mins PTA. Code stroke called.  Will send pt to main ED for further evaluation.

## 2021-07-06 NOTE — PATIENT PROFILE ADULT - CENTRAL VENOUS CATHETER/PICC LINE
Received refill request for hydroxyzine  Last video visit: 12/9/2020  Last filled 12/9/2020    According to the [facility] refill policy, Alia Concepcion's Prescription refill request not approved due to class, will forward to PCP to advise on fill.     no

## 2021-07-06 NOTE — PATIENT PROFILE ADULT - NSASFALLATTEMPTOOB_GEN_A_NUR
Patient endorses typical pain at best is 3/10, at worst is 7/10. Per patient, pain is worse with changes in weather, stepping on accelerator, increased walking and standing. Pain is relieved by changing positions and applying ice pack sometimes .  Home set-up: lives alone in apartment without stairs to negotiate. Bathroom is a step-in shower without grab rails, with retractable shower head. Endorses will be supported by no one post-op; has a girlfriend who resides in different home and cannot stay with him most of the day. Patient is currently independent with mobility. Reports owns a cane but does not use it . Patient is retired; is right-handed and drives; wears glasses always/for reading. Patient denies a fall in past 6 months.
no

## 2021-07-06 NOTE — ED ADULT NURSE NOTE - CHIEF COMPLAINT QUOTE
patient's family states while taking a walk in park with walker patient developed right facial droop, slurred speech and right sided weakness, patient has history of ovarian cancer under chemo therapy, DM, chronic back pain secondary to herniated disc, dr. cruz in to triage to evaluate patient, code stroke called at 1424, BGM in triage 121, [facility] 4

## 2021-07-06 NOTE — ED PROVIDER NOTE - OBJECTIVE STATEMENT
75 y/o F with a PMHx of ovarian Ca ([facility] patient, Dxs in April 2021, on Chemo no radiation), DVT on Xarelto, HTN, HLD and asthma , DM, chronic back pain secondary to herniated disc presents to the ED c/o facial droop, slurred speech and R sided weakness. Per pt's son, pt was outside with son when pt c/o R sided weakness on lower and upper extremities in addition to R sided facial droop. Son brought mom to hospital immediately. Pt is on Xarelto since pt had similar symptoms and was evaluated for a TIA 2 weeks ago and saw Dr. Lopez who put her on an anticoagulant. Pt took Xarelto this morning as the most recent dose. Currently states that there is a slight improvement of extremities.  Code stroke called.

## 2021-07-07 ENCOUNTER — TRANSCRIPTION ENCOUNTER (OUTPATIENT)
Age: 76
End: 2021-07-07

## 2021-07-07 LAB
A1C WITH ESTIMATED AVERAGE GLUCOSE RESULT: 6.6 % — HIGH (ref 4–5.6)
ADD ON TEST-SPECIMEN IN LAB: SIGNIFICANT CHANGE UP
ALBUMIN SERPL ELPH-MCNC: 2.8 G/DL — LOW (ref 3.3–5)
ALP SERPL-CCNC: 83 U/L — SIGNIFICANT CHANGE UP (ref 40–120)
ALT FLD-CCNC: 24 U/L — SIGNIFICANT CHANGE UP (ref 12–78)
ANION GAP SERPL CALC-SCNC: 6 MMOL/L — SIGNIFICANT CHANGE UP (ref 5–17)
AST SERPL-CCNC: 21 U/L — SIGNIFICANT CHANGE UP (ref 15–37)
BILIRUB SERPL-MCNC: 0.7 MG/DL — SIGNIFICANT CHANGE UP (ref 0.2–1.2)
BUN SERPL-MCNC: 19 MG/DL — SIGNIFICANT CHANGE UP (ref 7–23)
CALCIUM SERPL-MCNC: 8.7 MG/DL — SIGNIFICANT CHANGE UP (ref 8.5–10.1)
CHLORIDE SERPL-SCNC: 106 MMOL/L — SIGNIFICANT CHANGE UP (ref 96–108)
CHOLEST SERPL-MCNC: 127 MG/DL — SIGNIFICANT CHANGE UP
CO2 SERPL-SCNC: 26 MMOL/L — SIGNIFICANT CHANGE UP (ref 22–31)
COVID-19 SPIKE DOMAIN AB INTERP: POSITIVE
COVID-19 SPIKE DOMAIN ANTIBODY RESULT: >250 U/ML — HIGH
CREAT SERPL-MCNC: 0.86 MG/DL — SIGNIFICANT CHANGE UP (ref 0.5–1.3)
ESTIMATED AVERAGE GLUCOSE: 143 MG/DL — HIGH (ref 68–114)
GLUCOSE SERPL-MCNC: 138 MG/DL — HIGH (ref 70–99)
HCT VFR BLD CALC: 34.2 % — LOW (ref 34.5–45)
HDLC SERPL-MCNC: 33 MG/DL — LOW
HGB BLD-MCNC: 10.5 G/DL — LOW (ref 11.5–15.5)
LIPID PNL WITH DIRECT LDL SERPL: 70 MG/DL — SIGNIFICANT CHANGE UP
MCHC RBC-ENTMCNC: 25.2 PG — LOW (ref 27–34)
MCHC RBC-ENTMCNC: 30.7 GM/DL — LOW (ref 32–36)
MCV RBC AUTO: 82 FL — SIGNIFICANT CHANGE UP (ref 80–100)
NON HDL CHOLESTEROL: 95 MG/DL — SIGNIFICANT CHANGE UP
PLATELET # BLD AUTO: 226 K/UL — SIGNIFICANT CHANGE UP (ref 150–400)
POTASSIUM SERPL-MCNC: 3.9 MMOL/L — SIGNIFICANT CHANGE UP (ref 3.5–5.3)
POTASSIUM SERPL-SCNC: 3.9 MMOL/L — SIGNIFICANT CHANGE UP (ref 3.5–5.3)
PROT SERPL-MCNC: 6 GM/DL — SIGNIFICANT CHANGE UP (ref 6–8.3)
RBC # BLD: 4.17 M/UL — SIGNIFICANT CHANGE UP (ref 3.8–5.2)
RBC # FLD: 18.6 % — HIGH (ref 10.3–14.5)
SARS-COV-2 IGG+IGM SERPL QL IA: >250 U/ML — HIGH
SARS-COV-2 IGG+IGM SERPL QL IA: POSITIVE
SODIUM SERPL-SCNC: 138 MMOL/L — SIGNIFICANT CHANGE UP (ref 135–145)
TRIGL SERPL-MCNC: 120 MG/DL — SIGNIFICANT CHANGE UP
WBC # BLD: 3.36 K/UL — LOW (ref 3.8–10.5)
WBC # FLD AUTO: 3.36 K/UL — LOW (ref 3.8–10.5)

## 2021-07-07 PROCEDURE — 99232 SBSQ HOSP IP/OBS MODERATE 35: CPT

## 2021-07-07 PROCEDURE — 95816 EEG AWAKE AND DROWSY: CPT | Mod: 26

## 2021-07-07 PROCEDURE — 99233 SBSQ HOSP IP/OBS HIGH 50: CPT

## 2021-07-07 RX ORDER — NIFEDIPINE 30 MG
30 TABLET, EXTENDED RELEASE 24 HR ORAL DAILY
Refills: 0 | Status: DISCONTINUED | OUTPATIENT
Start: 2021-07-08 | End: 2021-07-09

## 2021-07-07 RX ORDER — INSULIN GLARGINE 100 [IU]/ML
5 INJECTION, SOLUTION SUBCUTANEOUS AT BEDTIME
Refills: 0 | Status: DISCONTINUED | OUTPATIENT
Start: 2021-07-07 | End: 2021-07-12

## 2021-07-07 RX ORDER — SODIUM CHLORIDE 9 MG/ML
1000 INJECTION INTRAMUSCULAR; INTRAVENOUS; SUBCUTANEOUS
Refills: 0 | Status: DISCONTINUED | OUTPATIENT
Start: 2021-07-07 | End: 2021-07-08

## 2021-07-07 RX ORDER — ALPRAZOLAM 0.25 MG
0.25 TABLET ORAL ONCE
Refills: 0 | Status: DISCONTINUED | OUTPATIENT
Start: 2021-07-07 | End: 2021-07-07

## 2021-07-07 RX ORDER — INSULIN LISPRO 100/ML
VIAL (ML) SUBCUTANEOUS AT BEDTIME
Refills: 0 | Status: DISCONTINUED | OUTPATIENT
Start: 2021-07-07 | End: 2021-07-12

## 2021-07-07 RX ADMIN — Medication 81 MILLIGRAM(S): at 10:13

## 2021-07-07 RX ADMIN — Medication 60 MILLIGRAM(S): at 10:13

## 2021-07-07 RX ADMIN — ONDANSETRON 4 MILLIGRAM(S): 8 TABLET, FILM COATED ORAL at 15:56

## 2021-07-07 RX ADMIN — GABAPENTIN 300 MILLIGRAM(S): 400 CAPSULE ORAL at 17:47

## 2021-07-07 RX ADMIN — INSULIN GLARGINE 5 UNIT(S): 100 INJECTION, SOLUTION SUBCUTANEOUS at 21:07

## 2021-07-07 RX ADMIN — RIVAROXABAN 20 MILLIGRAM(S): KIT at 21:07

## 2021-07-07 RX ADMIN — GABAPENTIN 300 MILLIGRAM(S): 400 CAPSULE ORAL at 05:36

## 2021-07-07 RX ADMIN — ATORVASTATIN CALCIUM 80 MILLIGRAM(S): 80 TABLET, FILM COATED ORAL at 21:07

## 2021-07-07 RX ADMIN — Medication 4: at 12:50

## 2021-07-07 RX ADMIN — Medication 0.25 MILLIGRAM(S): at 15:50

## 2021-07-07 RX ADMIN — SODIUM CHLORIDE 75 MILLILITER(S): 9 INJECTION INTRAMUSCULAR; INTRAVENOUS; SUBCUTANEOUS at 17:47

## 2021-07-07 RX ADMIN — PANTOPRAZOLE SODIUM 40 MILLIGRAM(S): 20 TABLET, DELAYED RELEASE ORAL at 10:13

## 2021-07-07 RX ADMIN — GABAPENTIN 300 MILLIGRAM(S): 400 CAPSULE ORAL at 21:07

## 2021-07-07 RX ADMIN — Medication 112 MICROGRAM(S): at 05:35

## 2021-07-07 RX ADMIN — LOSARTAN POTASSIUM 50 MILLIGRAM(S): 100 TABLET, FILM COATED ORAL at 10:13

## 2021-07-07 NOTE — DISCHARGE NOTE NURSING/CASE MANAGEMENT/SOCIAL WORK - PATIENT PORTAL LINK FT
You can access the FollowMyHealth Patient Portal offered by [facility] by registering at the following website: [email or web address]. By joining [employer]’s FollowMyHealth portal, you will also be able to view your health information using other applications (apps) compatible with our system.

## 2021-07-07 NOTE — CONSULT NOTE ADULT - SUBJECTIVE AND OBJECTIVE BOX
HPI:  Pt does not give any history. Hx obtained from pt's son and chart. 76/ F with PMHx of ovarian CA ([facility] patient, Dxs in April 2021, on Chemo no radiation), DVT on Xarelto, HTN, HLD and asthma , DM, chronic back pain secondary to herniated disc presents to the ED c/o facial droop, slurred speech and Right sided weakness. Per pt's son, pt was outside with son when pt c/o R sided weakness on lower and upper extremities in addition to R sided facial droop. Son brought mom to hospital immediately. Pt is on Xarelto since pt had similar symptoms and was evaluated for a TIA 2 weeks ago and saw Dr. Lopez who put her on an anticoagulant. Pt took Xarelto this morning as the most recent dose. Currently states that there is a slight improvement of extremities. Pt was seen y neuro and deemed to be tPA candidate as on xarelto.  (06 Jul 2021 17:38)      PAST MEDICAL & SURGICAL HISTORY:  Ovarian cancer    Asthma    HLD (hyperlipidemia)    DVT (deep venous thrombosis)    HTN (hypertension)    No significant past surgical history        Allergies    Betadine (Unknown)  tetracycline (Unknown)    Intolerances        MEDICATIONS  (STANDING):  aspirin  chewable 81 milliGRAM(s) Oral daily  atorvastatin 80 milliGRAM(s) Oral at bedtime  dextrose 40% Gel 15 Gram(s) Oral once  dextrose 5%. 1000 milliLiter(s) (50 mL/Hr) IV Continuous <Continuous>  dextrose 5%. 1000 milliLiter(s) (100 mL/Hr) IV Continuous <Continuous>  dextrose 50% Injectable 25 Gram(s) IV Push once  dextrose 50% Injectable 12.5 Gram(s) IV Push once  dextrose 50% Injectable 25 Gram(s) IV Push once  gabapentin 300 milliGRAM(s) Oral three times a day  glucagon  Injectable 1 milliGRAM(s) IntraMuscular once  insulin glargine Injectable (LANTUS) 5 Unit(s) SubCutaneous at bedtime  insulin lispro (ADMELOG) corrective regimen sliding scale   SubCutaneous at bedtime  insulin lispro (ADMELOG) corrective regimen sliding scale   SubCutaneous three times a day before meals  levothyroxine 112 MICROGram(s) Oral daily  losartan 50 milliGRAM(s) Oral daily  pantoprazole    Tablet 40 milliGRAM(s) Oral before breakfast  rivaroxaban 20 milliGRAM(s) Oral daily  sodium chloride 0.9%. 1000 milliLiter(s) (75 mL/Hr) IV Continuous <Continuous>    MEDICATIONS  (PRN):  ondansetron Injectable 4 milliGRAM(s) IV Push every 6 hours PRN Nausea  senna 2 Tablet(s) Oral at bedtime PRN Constipation      FAMILY HISTORY:  Family history unobtainable due to patient&#x27;s condition        SOCIAL HISTORY: No EtOH, no tobacco    REVIEW OF SYSTEMS:    CONSTITUTIONAL: No weakness, fevers or chills  EYES/ENT: No visual changes;  No vertigo or throat pain   NECK: No pain or stiffness  RESPIRATORY: No cough, wheezing, hemoptysis; No shortness of breath  CARDIOVASCULAR: No chest pain or palpitations  GASTROINTESTINAL: No abdominal or epigastric pain. No nausea, vomiting, or hematemesis; No diarrhea or constipation. No melena or hematochezia.  GENITOURINARY: No dysuria, frequency or hematuria  NEUROLOGICAL: No numbness or weakness  SKIN: No itching, burning, rashes, or lesions   All other review of systems is negative unless indicated above.        T(F): 98.2 (07-07-21 @ 07:14), Max: 98.9 (07-06-21 @ 20:52)  HR: 70 (07-07-21 @ 07:14)  BP: 138/65 (07-07-21 @ 07:14)  RR: 16 (07-07-21 @ 07:14)  SpO2: 96% (07-07-21 @ 07:14)  Wt(kg): --    GENERAL: NAD, well-developed  HEAD:  Atraumatic, Normocephalic  EYES: EOMI, PERRLA, conjunctiva and sclera clear  NECK: Supple, No JVD  CHEST/LUNG: Clear to auscultation bilaterally; No wheeze  HEART: Regular rate and rhythm; No murmurs, rubs, or gallops  ABDOMEN: Soft, Nontender, Nondistended; Bowel sounds present  EXTREMITIES:  2+ Peripheral Pulses, No clubbing, cyanosis, or edema  NEUROLOGY: non-focal  SKIN: No rashes or lesions                          10.5   3.36  )-----------( 226      ( 07 Jul 2021 09:52 )             34.2       07-07    138  |  106  |  19  ----------------------------<  138<H>  3.9   |  26  |  0.86    Ca    8.7      07 Jul 2021 09:52  Mg     1.7     07-07    TPro  6.0  /  Alb  2.8<L>  /  TBili  0.7  /  DBili  x   /  AST  21  /  ALT  24  /  AlkPhos  83  07-07      Magnesium, Serum: 1.7 mg/dL (07-07 @ 09:52)      PT/INR - ( 06 Jul 2021 14:39 )   PT: 15.1 sec;   INR: 1.32 ratio         PTT - ( 06 Jul 2021 14:39 )  PTT:28.3 sec    .Blood None  06-20 @ 23:07   No Growth Final  --  --      .Urine Clean Catch (Midstream)  06-20 @ 21:33   No growth  --  --      .Blood Blood  06-20 @ 21:25   No Growth Final  --  --

## 2021-07-07 NOTE — SWALLOW BEDSIDE ASSESSMENT ADULT - COMMENTS
Pt was admitted to  with right sided weakness and verbal expression difficulties that are improving. She was also admitted to this facility in 6/21 with transient slurred speech/word finding difficulties that resolved. A TIA was suspected at the time. Of note is that the patient has an underlying history of ovarian cancer for which she is on chemo, DM, HTN, HLD, asthma and prior DVT.

## 2021-07-07 NOTE — PROGRESS NOTE ADULT - ASSESSMENT
· Assessment	  Pt is a 76 year old woman with a PMH of metastatic ovarian cancer, followed by [facility], asthma, DVT on Xarelto HTN, HLD, recent TIA s/p admission at  6/20-6/23  She presents to the ED today with acute onset of right sided weakness.   As per the son at bedside, pt was a that the park when she started to c/o back pain, right sided weakness, first in the arm and then spreading to the leg and then facial numbness, pt was unable to walk to the cart. Pt then began to develop dysarthria and expressive aphasia. Pt arrive in the ED and a CODE STROKE was called at 2:23pm and the pt was taken to the CT scanner. CT is negative for acute stroke or hemorrhage, CTA is negative for large vessel occlusion.   Pt was difficult to examine as she was very emotional and either couldn't or didn't want to follow commands. She could not answer any questions, no clear verbal output, no gross facial asymmetry, moving all extremities antigravity, but is moving the left side more than the right, unable to test dysmetria, sensation appears intact. NIHSS 8 at minimum ,with  resolved sx today  Pt is not a candidate for t-PA as she took her Xarelto and aspirin last night.     #TIA vs stroke vs metabolic encephalopathy    admit to medicine  -MR brain with contrast pending   -Continue Xarelto with Aspirin  -evaluation of metastatic disease  -PT eval  -Hyper coag w/u  -Follow up with oncology  -EEG  Discussed with Pt and NP SYMONE Lopes

## 2021-07-07 NOTE — DISCHARGE NOTE PROVIDER - NSDCFUSCHEDAPPT_GEN_ALL_CORE_FT
BEVERLEY WAYNE ; 08/18/2021 ; [facility] [address] BEVERLEY WAYNE ; 07/21/2021 ; [facility] [address] BEVERLEY Hendricks ; 08/18/2021 ; N[facility] Neu[facility] [address]

## 2021-07-07 NOTE — DISCHARGE NOTE PROVIDER - HOSPITAL COURSE
HPI:  76/ F with PMHx of ovarian CA ([facility] patient, Dxs in April 2021, on Chemo no radiation), DVT on Xarelto, HTN, HLD and asthma , DM, chronic back pain secondary to herniated disc presents to the ED c/o facial droop, slurred speech and Right sided weakness. Per pt's son, pt was outside with son when pt c/o R sided weakness on lower and upper extremities in addition to R sided facial droop. Son brought mom to hospital immediately. Pt is on Xarelto since pt had similar symptoms and was evaluated for a TIA 2 weeks ago and saw Dr. Lopez who put her on an anticoagulant??. Work up included: CTH- negative for acute ischemia, EEG- neg for seizure, MR brain- No acute or subacute stroke. Pt took Xarelto this morning as the most recent dose. Currently states that there is a slight improvement of extremities. Pt was seen y neuro and deemed not to be tPA candidate as on xarelto.     HOSPITAL COURSE:   #Stroke vs TIA vs Metabolic Encephalopathy   #Metabolic encephalopathy - Resolved; note recent chemo for Ovarian Ca   - monitor on tele. tele review as above.  - CT perfusion: 2 small areas at risk for infarction, in the temporal regions bilaterally. No LVO  - MRI brain with contrast pending, patient unable to tolerate despite IV meds/distraction techniques. Can not entirely rule out CVA.   - Will cont. ASA/Statin/Xarelto  - EEG - mild abnormal diffuse slowing  - Echo reviewed. EF 55%, no severe valvular abnormalities   - Neuro, PT, Speech consulted  7/10 - OOB today, neuro stable   7/11 - neuro stable; discussed with Onc - MRI brain as OP r/o mets     #Persistent Orthostatic Hypotension  - Echo 6/21/21 EF 55%, mod MR, mild, TR, mild pulm HTN  - s/p IVF  - STOPPED nifedipine (7/9)  - Cont. compression socks b/l  7/12 - patient is ambulating in hallway, denies symptoms     #Depression, Anixety  #Acute on Chronic Pain, herniated discs/abd pain  - Cont. Oxycodone q4 PRN  - Fentanyl 12mcg daily (started 7/9)  - Senna, miralax for Bm regimen  - Cymbalta started for pain/depression  - Palliative, Psych f/u appreciated   7/10 - pain improving with fentanyl patch, encourage OOB     #HLD | HTN  - Cont. Statin, Losartan  - Nifedipine held due to orthostasis   7/10 - cont to hold CCB     #DM Type 2  - hold any oral DM meds, pt on Tresiba 30u qhs at home  - Cont. Lantus 5U HS, ISS  - a1c 6.6     #Malnutrition  #Folic Acid Deficiency   - nutrition eval pending.     #HX DVT, DVT ppx  - Cont. Xarelto    #Ovarian CA  #Leukopenia   - Dx in April 2021  - As per pt, Last chemo 6/30 with carboplatin/taxol for total of 3 rounds  - Patient experiencing mult. s/e r/t chemo  7/11 - Neutropenia, on neutropenic precautions, no fever   AM labs   7/12 - discussed with ONcology, stable for discharge, f/u with DR Murdock to monitor labs and consider neulasta etc   DIscussed with patient regarding neutropenic precautions and monitor herself for fevers     Dispo 7/12: home w/ home PT  Pt seen by Pall Care and Psychiatry, discussed with family at bedside 7/10,   Discussed with RN and Onc  f/u Dr Murdock/Onc as OP - she gets her pain meds from the Oncology office as well.   will send her with 7-10 d supply of  pain meds for now

## 2021-07-07 NOTE — SWALLOW BEDSIDE ASSESSMENT ADULT - SWALLOW EVAL: RECOMMENDED DIET
SUGGEST A REGULAR TEXTURE DIET WITH THIN LIQUID CONSISTENCIES AS THE PATIENT TOLERATES THESE FOOD TEXTURES FROM AN OROPHARYNGEAL SWALLOWING PERSPECTIVE ON CLINICAL EXAM.

## 2021-07-07 NOTE — DISCHARGE NOTE PROVIDER - PROVIDER TOKENS
PROVIDER:[TOKEN:[[id number]],FOLLOWUP:[1 week]],FREE:[LAST:[See Dr MONCADA],PHONE:[(   )    -],FAX:[(   )    -],FOLLOWUP:[1-3 days]]

## 2021-07-07 NOTE — DISCHARGE NOTE PROVIDER - NSDCMRMEDTOKEN_GEN_ALL_CORE_FT
aspirin 81 mg oral tablet: 1 tab(s) orally once a day  atorvastatin 80 mg oral tablet: 1 tab(s) orally once a day (at bedtime)  gabapentin 300 mg oral capsule: 1 cap(s) orally 3 times a day  levothyroxine 112 mcg (0.112 mg) oral tablet: 1 tab(s) orally once a day  losartan 50 mg oral tablet: 1 tab(s) orally once a day  NIFEdipine 30 mg oral tablet, extended release: 2 tab(s) orally once a day  NovoLOG FlexPen 100 units/mL injectable solution: Use as needed after chemo  pantoprazole 40 mg oral delayed release tablet: 1 tab(s) orally once a day (in the morning)  Pfizer-BioNTech COVID-19 Vaccine PF 30 mcg/0.3 mL intramuscular suspension: 0.3 milliliter(s) intramuscular once  ***2nd dose a few months ago***  Tresiba 100 units/mL subcutaneous solution: 30 unit(s) subcutaneous once (at bedtime)  Xarelto 20 mg oral tablet: 1 tab(s) orally once a day (in the evening)   ALPRAZolam 0.5 mg oral tablet: 1 tab(s) orally once a day, As Needed -Anxiety MDD:one to two tablets  aspirin 81 mg oral tablet: 1 tab(s) orally once a day  atorvastatin 80 mg oral tablet: 1 tab(s) orally once a day (at bedtime)  DULoxetine 30 mg oral delayed release capsule: 1 cap(s) orally once a day  fentaNYL 12 mcg/hr transdermal film, extended release: 1 patch transdermal every 72 hours MDD:one patch only  folic acid 1 mg oral tablet: 1 tab(s) orally once a day  gabapentin 300 mg oral capsule: 1 cap(s) orally 3 times a day  levothyroxine 112 mcg (0.112 mg) oral tablet: 1 tab(s) orally once a day  losartan 50 mg oral tablet: 1 tab(s) orally once a day  NIFEdipine 30 mg oral tablet, extended release: 2 tab(s) orally once a day  NovoLOG FlexPen 100 units/mL injectable solution: Use as needed after chemo  oxycodone-acetaminophen 5 mg-325 mg oral tablet: 1 tab(s) orally every 6 hours, As Needed - moderate to severe pain MDD:four to five tablets  pantoprazole 40 mg oral delayed release tablet: 1 tab(s) orally once a day (in the morning)  senna oral tablet: 2 tab(s) orally once a day (at bedtime), As Needed constipation   Tresiba 100 units/mL subcutaneous solution: 30 unit(s) subcutaneous once (at bedtime)  Xarelto 20 mg oral tablet: 1 tab(s) orally once a day (in the evening)

## 2021-07-07 NOTE — DISCHARGE NOTE PROVIDER - NSDCFUADDAPPT_GEN_ALL_CORE_FT
appointment with her PCP  Rg on july 21st at 10am  spoke with Elizabeth appointment with her PCP  Dr Diez on july 21st at 10am  spoke with Elizabeth

## 2021-07-07 NOTE — SWALLOW BEDSIDE ASSESSMENT ADULT - SLP GENERAL OBSERVATIONS
Pt is alert and interactive. Pt able to verbalize during communicative probes via intelligible, fluent, linguistically intact, contextually appropriate utterances. No speech-language pathology evident. Pt able to verbalize needs and is at reported communicative baseline.

## 2021-07-07 NOTE — PROGRESS NOTE ADULT - SUBJECTIVE AND OBJECTIVE BOX
· Reason for Admission	Confusion  7/7/21 : Pt feeling better. Speech improved. No Rt side weakness or numbness. Ambulated to bathroom unassisted.     MEDICATIONS  (STANDING):  ALPRAZolam 0.25 milliGRAM(s) Oral once  aspirin  chewable 81 milliGRAM(s) Oral daily  atorvastatin 80 milliGRAM(s) Oral at bedtime  dextrose 40% Gel 15 Gram(s) Oral once  dextrose 5% + sodium chloride 0.9%. 1000 milliLiter(s) (83 mL/Hr) IV Continuous <Continuous>  dextrose 5%. 1000 milliLiter(s) (50 mL/Hr) IV Continuous <Continuous>  dextrose 5%. 1000 milliLiter(s) (100 mL/Hr) IV Continuous <Continuous>  dextrose 50% Injectable 25 Gram(s) IV Push once  dextrose 50% Injectable 12.5 Gram(s) IV Push once  dextrose 50% Injectable 25 Gram(s) IV Push once  gabapentin 300 milliGRAM(s) Oral three times a day  glucagon  Injectable 1 milliGRAM(s) IntraMuscular once  insulin lispro (ADMELOG) corrective regimen sliding scale   SubCutaneous three times a day before meals  levothyroxine 112 MICROGram(s) Oral daily  losartan 50 milliGRAM(s) Oral daily  NIFEdipine XL 60 milliGRAM(s) Oral daily  pantoprazole    Tablet 40 milliGRAM(s) Oral before breakfast  rivaroxaban 20 milliGRAM(s) Oral daily      Vital Signs Last 24 Hrs  T(C): 36.8 (07 Jul 2021 07:14), Max: 37.2 (06 Jul 2021 20:52)  T(F): 98.2 (07 Jul 2021 07:14), Max: 98.9 (06 Jul 2021 20:52)  HR: 70 (07 Jul 2021 07:14) (70 - 93)  BP: 138/65 (07 Jul 2021 07:14) (116/61 - 160/89)  BP(mean): 87 (06 Jul 2021 18:00) (72 - 116)  RR: 16 (07 Jul 2021 07:14) (15 - 22)  SpO2: 96% (07 Jul 2021 07:14) (96% - 100%)    Neurological Examination:  NIHSS: 0  MS: AOx3. Appropriately interactive, normal affect. Speech fluent w/o paraphasic error, follows commands   CN: PERLL, EOMI, V1-3 sensation intact, face symmetric, hearing intact, palate elevates symmetrically, tongue midline, SCM equal bilaterally  Motor: normal bulk and tone, no tremor, rigidity or bradykinesia.  LEs proximal  otherwise 5/5 all over, no pronator drift  Sens: Intact to light touch.    Reflexes: 0/4 all over, downgoing toes b/l  Coord:  No dysmetria, BRISSA intact   Gait: Intact                        10.9   5.05  )-----------( 251      ( 06 Jul 2021 14:39 )             34.6     07-06    138  |  107  |  29<H>  ----------------------------<  117<H>  3.9   |  19<L>  |  1.09    Ca    9.0      06 Jul 2021 14:39    TPro  6.5  /  Alb  3.2<L>  /  TBili  0.6  /  DBili  x   /  AST  31  /  ALT  27  /  AlkPhos  86  07-06      06-21 Chol 110 LDL -- HDL 36<L> Trig 104    Radiology report:  - CT Head  IMPRESSION: Atrophy and small vessel white matter ischemic changes. No change since 6/20/2021. No hemorrhage.  CT perfusion demonstrates 2 small areas at risk for infarction, in the temporal regions bilaterally.  CTA is limited by motion but demonstrates no large vessel occlusion.

## 2021-07-07 NOTE — PROGRESS NOTE ADULT - SUBJECTIVE AND OBJECTIVE BOX
CHIEF COMPLAINT/DIAGNOSIS:    HPI:  76/ F with PMHx of ovarian CA ([facility] patient, Dxs in April 2021, on Chemo no radiation), DVT on Xarelto, HTN, HLD and asthma , DM, chronic back pain secondary to herniated disc presents to the ED c/o facial droop, slurred speech and Right sided weakness. Per pt's son, pt was outside with son when pt c/o R sided weakness on lower and upper extremities in addition to R sided facial droop. Son brought mom to hospital immediately. Pt is on Xarelto since pt had similar symptoms and was evaluated for a TIA 2 weeks ago and saw Dr. Lopez who put her on an anticoagulant. Work up included: CTH- negative for acute ischemia, EEG- neg for seizure, MR brain- No acute or subacute stroke. Pt took Xarelto this morning as the most recent dose. Currently states that there is a slight improvement of extremities. Pt was seen y neuro and deemed not to be tPA candidate as on xarelto.     SUBJECTIVE:  7/7: Pt assessed at bedside. States that her difficulty speaking and weakness has improved. c/o of nausea. Denies chest pain, SOB.     REVIEW OF SYSTEMS:  All other review of systems is negative unless indicated above    PHYSICAL EXAM:  Constitutional: NAD, awake and alert, well-developed  HEENT: PERR, EOMI, Normal Hearing, MMM  Neck: Soft and supple, No LAD, No JVD  Respiratory: Breath sounds are clear bilaterally, No wheezing, rales or rhonchi  Cardiovascular: S1 and S2, regular rate and rhythm, no Murmurs, gallops or rubs  Gastrointestinal: Bowel Sounds present, soft, nontender, nondistended, no guarding, no rebound  Extremities: No peripheral edema  Vascular: 2+ peripheral pulses  Neurological: A/O x 3, no gross focal deficits, pronator drift neg  Musculoskeletal: 5/5 strength b/l upper and lower extremities  Skin: No rashes    Vital Signs Last 24 Hrs  T(C): 36.8 (07 Jul 2021 07:14), Max: 37.2 (06 Jul 2021 20:52)  T(F): 98.2 (07 Jul 2021 07:14), Max: 98.9 (06 Jul 2021 20:52)  HR: 70 (07 Jul 2021 07:14) (70 - 93)  BP: 138/65 (07 Jul 2021 07:14) (116/61 - 160/89)  BP(mean): 87 (06 Jul 2021 18:00) (72 - 116)  RR: 16 (07 Jul 2021 07:14) (15 - 22)  SpO2: 96% (07 Jul 2021 07:14) (96% - 100%)    LABS: All Labs Reviewed:                        10.5   3.36  )-----------( 226      ( 07 Jul 2021 09:52 )             34.2     07-07    138  |  106  |  19  ----------------------------<  138<H>  3.9   |  26  |  0.86    Ca    8.7      07 Jul 2021 09:52  Mg     1.7     07-07    TPro  6.0  /  Alb  2.8<L>  /  TBili  0.7  /  DBili  x   /  AST  21  /  ALT  24  /  AlkPhos  83  07-07    PT/INR - ( 06 Jul 2021 14:39 )   PT: 15.1 sec;   INR: 1.32 ratio       PTT - ( 06 Jul 2021 14:39 )  PTT:28.3 sec  CARDIAC MARKERS ( 06 Jul 2021 14:39 )  <0.015 ng/mL / x     / x     / x     / x        Blood Culture: N/A    RADIOLOGY:  < from: CT Brain Stroke Protocol (07.06.21 @ 14:33) >  IMPRESSION: Atrophy and small vessel white matter ischemic changes. No change since 6/20/2021. No hemorrhage.    CT perfusion demonstrates 2 small areas at risk for infarction, in the temporal regions bilaterally.     CTA is limited by motion but demonstrates no large vessel occlusion.    < end of copied text >    ECHOCARDIOGRAM: TTE pending    MEDICATIONS:  MEDICATIONS  (STANDING):  ALPRAZolam 0.25 milliGRAM(s) Oral once  aspirin  chewable 81 milliGRAM(s) Oral daily  atorvastatin 80 milliGRAM(s) Oral at bedtime  dextrose 40% Gel 15 Gram(s) Oral once  dextrose 5% + sodium chloride 0.9%. 1000 milliLiter(s) (83 mL/Hr) IV Continuous <Continuous>  dextrose 5%. 1000 milliLiter(s) (50 mL/Hr) IV Continuous <Continuous>  dextrose 5%. 1000 milliLiter(s) (100 mL/Hr) IV Continuous <Continuous>  dextrose 50% Injectable 25 Gram(s) IV Push once  dextrose 50% Injectable 12.5 Gram(s) IV Push once  dextrose 50% Injectable 25 Gram(s) IV Push once  gabapentin 300 milliGRAM(s) Oral three times a day  glucagon  Injectable 1 milliGRAM(s) IntraMuscular once  insulin lispro (ADMELOG) corrective regimen sliding scale   SubCutaneous at bedtime  insulin lispro (ADMELOG) corrective regimen sliding scale   SubCutaneous three times a day before meals  levothyroxine 112 MICROGram(s) Oral daily  losartan 50 milliGRAM(s) Oral daily  NIFEdipine XL 60 milliGRAM(s) Oral daily  pantoprazole    Tablet 40 milliGRAM(s) Oral before breakfast  rivaroxaban 20 milliGRAM(s) Oral daily    Home Medications:  aspirin 81 mg oral tablet: 1 tab(s) orally once a day (06 Jul 2021 17:43)  gabapentin 300 mg oral capsule: 1 cap(s) orally 3 times a day (06 Jul 2021 17:43)  levothyroxine 112 mcg (0.112 mg) oral tablet: 1 tab(s) orally once a day (06 Jul 2021 17:43)  losartan 50 mg oral tablet: 1 tab(s) orally once a day (06 Jul 2021 17:43)  NIFEdipine 30 mg oral tablet, extended release: 2 tab(s) orally once a day (06 Jul 2021 17:43)  NovoLOG FlexPen 100 units/mL injectable solution: Use as needed after chemo (06 Jul 2021 17:43)  pantoprazole 40 mg oral delayed release tablet: 1 tab(s) orally once a day (in the morning) (06 Jul 2021 17:43)  Pfizer-BioNTech COVID-19 Vaccine PF 30 mcg/0.3 mL intramuscular suspension: 0.3 milliliter(s) intramuscular once  ***2nd dose a few months ago*** (06 Jul 2021 17:43)  Tresiba 100 units/mL subcutaneous solution: 30 unit(s) subcutaneous once (at bedtime) (06 Jul 2021 17:43)  Xarelto 20 mg oral tablet: 1 tab(s) orally once a day (in the evening) (06 Jul 2021 17:43)      TELEMETRY REVIEW:  7/7: NSR 70s-80 with     ASSESSMENT AND PLAN:    #Stroke vs TIA  #Metabolic encephalopathy- resolved  - monitor on tele. tele review as above.  - CT perfusion: 2 small areas at risk for infarction, in the temporal regions bilaterally. No LVO  - MRI brain with contrast pending  - PT eval: rec home w/ assist  - Speech: no CI to regular diet  - cont ASA 81daily  - cont atorvastatin daily  - lipid panel pending  - a1c pending  - TTE pending  - EEG pending  - neuro consulted    #Orthostatic Hypotension  - Echo 6/21/21 EF 55%, mod MR, mild, TR, mild pulm HTN  - cont with IVF NS @75  - decrease nifedipine     #Respiratory Alkalosis    #Hx of Ovarian CA  - Dx in April 2021  - As per pt, Last chemo 6/30 with carboplatin/taxol for total of 3 rounds    #HLD  - cont atovastatin  - lipid panel pending    #HTN  - decreased CCB dose in setting of orthostasis    #DM Type 2  - hold any oral DM meds, pt on tresiba 30u qhs at home  - start basal insulin now that pt cleared by speech to eat  - on ISS  - a1c pending    #DVT ppx  - pt with hx of DVT on xarelto   CHIEF COMPLAINT/DIAGNOSIS:    HPI:  76/ F with PMHx of ovarian CA ([facility] patient, Dxs in April 2021, on Chemo no radiation), DVT on Xarelto, HTN, HLD and asthma , DM, chronic back pain secondary to herniated disc presents to the ED c/o facial droop, slurred speech and Right sided weakness. Per pt's son, pt was outside with son when pt c/o R sided weakness on lower and upper extremities in addition to R sided facial droop. Son brought mom to hospital immediately. Pt is on Xarelto since pt had similar symptoms and was evaluated for a TIA 2 weeks ago and saw Dr. Lopez who put her on an anticoagulant. Work up included: CTH- negative for acute ischemia, EEG- neg for seizure, MR brain- No acute or subacute stroke. Pt took Xarelto this morning as the most recent dose. Currently states that there is a slight improvement of extremities. Pt was seen y neuro and deemed not to be tPA candidate as on xarelto.     SUBJECTIVE:  7/7: Pt assessed at bedside. States that her difficulty speaking and weakness has improved. c/o of nausea. Denies chest pain, SOB.     REVIEW OF SYSTEMS:  All other review of systems is negative unless indicated above    PHYSICAL EXAM:  Constitutional: NAD, awake and alert, well-developed  HEENT: PERR, EOMI, Normal Hearing, MMM  Neck: Soft and supple, No LAD, No JVD  Respiratory: Breath sounds are clear bilaterally, No wheezing, rales or rhonchi  Cardiovascular: S1 and S2, regular rate and rhythm, no Murmurs, gallops or rubs  Gastrointestinal: Bowel Sounds present, soft, nontender, nondistended, no guarding, no rebound  Extremities: No peripheral edema  Vascular: 2+ peripheral pulses  Neurological: A/O x 3, no gross focal deficits, pronator drift neg  Musculoskeletal: 5/5 strength b/l upper and lower extremities  Skin: No rashes    Vital Signs Last 24 Hrs  T(C): 36.8 (07 Jul 2021 07:14), Max: 37.2 (06 Jul 2021 20:52)  T(F): 98.2 (07 Jul 2021 07:14), Max: 98.9 (06 Jul 2021 20:52)  HR: 70 (07 Jul 2021 07:14) (70 - 93)  BP: 138/65 (07 Jul 2021 07:14) (116/61 - 160/89)  BP(mean): 87 (06 Jul 2021 18:00) (72 - 116)  RR: 16 (07 Jul 2021 07:14) (15 - 22)  SpO2: 96% (07 Jul 2021 07:14) (96% - 100%)    LABS: All Labs Reviewed:                        10.5   3.36  )-----------( 226      ( 07 Jul 2021 09:52 )             34.2     07-07    138  |  106  |  19  ----------------------------<  138<H>  3.9   |  26  |  0.86    Ca    8.7      07 Jul 2021 09:52  Mg     1.7     07-07    TPro  6.0  /  Alb  2.8<L>  /  TBili  0.7  /  DBili  x   /  AST  21  /  ALT  24  /  AlkPhos  83  07-07    PT/INR - ( 06 Jul 2021 14:39 )   PT: 15.1 sec;   INR: 1.32 ratio       PTT - ( 06 Jul 2021 14:39 )  PTT:28.3 sec  CARDIAC MARKERS ( 06 Jul 2021 14:39 )  <0.015 ng/mL / x     / x     / x     / x        Blood Culture: N/A    RADIOLOGY:  < from: CT Brain Stroke Protocol (07.06.21 @ 14:33) >  IMPRESSION: Atrophy and small vessel white matter ischemic changes. No change since 6/20/2021. No hemorrhage.    CT perfusion demonstrates 2 small areas at risk for infarction, in the temporal regions bilaterally.     CTA is limited by motion but demonstrates no large vessel occlusion.    < end of copied text >    ECHOCARDIOGRAM: TTE pending    MEDICATIONS:  MEDICATIONS  (STANDING):  ALPRAZolam 0.25 milliGRAM(s) Oral once  aspirin  chewable 81 milliGRAM(s) Oral daily  atorvastatin 80 milliGRAM(s) Oral at bedtime  dextrose 40% Gel 15 Gram(s) Oral once  dextrose 5% + sodium chloride 0.9%. 1000 milliLiter(s) (83 mL/Hr) IV Continuous <Continuous>  dextrose 5%. 1000 milliLiter(s) (50 mL/Hr) IV Continuous <Continuous>  dextrose 5%. 1000 milliLiter(s) (100 mL/Hr) IV Continuous <Continuous>  dextrose 50% Injectable 25 Gram(s) IV Push once  dextrose 50% Injectable 12.5 Gram(s) IV Push once  dextrose 50% Injectable 25 Gram(s) IV Push once  gabapentin 300 milliGRAM(s) Oral three times a day  glucagon  Injectable 1 milliGRAM(s) IntraMuscular once  insulin lispro (ADMELOG) corrective regimen sliding scale   SubCutaneous at bedtime  insulin lispro (ADMELOG) corrective regimen sliding scale   SubCutaneous three times a day before meals  levothyroxine 112 MICROGram(s) Oral daily  losartan 50 milliGRAM(s) Oral daily  NIFEdipine XL 60 milliGRAM(s) Oral daily  pantoprazole    Tablet 40 milliGRAM(s) Oral before breakfast  rivaroxaban 20 milliGRAM(s) Oral daily    Home Medications:  aspirin 81 mg oral tablet: 1 tab(s) orally once a day (06 Jul 2021 17:43)  gabapentin 300 mg oral capsule: 1 cap(s) orally 3 times a day (06 Jul 2021 17:43)  levothyroxine 112 mcg (0.112 mg) oral tablet: 1 tab(s) orally once a day (06 Jul 2021 17:43)  losartan 50 mg oral tablet: 1 tab(s) orally once a day (06 Jul 2021 17:43)  NIFEdipine 30 mg oral tablet, extended release: 2 tab(s) orally once a day (06 Jul 2021 17:43)  NovoLOG FlexPen 100 units/mL injectable solution: Use as needed after chemo (06 Jul 2021 17:43)  pantoprazole 40 mg oral delayed release tablet: 1 tab(s) orally once a day (in the morning) (06 Jul 2021 17:43)  Pfizer-BioNTech COVID-19 Vaccine PF 30 mcg/0.3 mL intramuscular suspension: 0.3 milliliter(s) intramuscular once  ***2nd dose a few months ago*** (06 Jul 2021 17:43)  Tresiba 100 units/mL subcutaneous solution: 30 unit(s) subcutaneous once (at bedtime) (06 Jul 2021 17:43)  Xarelto 20 mg oral tablet: 1 tab(s) orally once a day (in the evening) (06 Jul 2021 17:43)      TELEMETRY REVIEW:  7/7: NSR 70s-80 with     ASSESSMENT AND PLAN:    #Stroke vs TIA  #Metabolic encephalopathy- resolved  - monitor on tele. tele review as above.  - CT perfusion: 2 small areas at risk for infarction, in the temporal regions bilaterally. No LVO  - MRI brain with contrast pending  - PT eval: rec home w/ assist  - Speech: no CI to regular diet  - cont ASA 81daily  - cont atorvastatin daily  - lipid panel pending  - a1c pending  - TTE pending  - EEG pending  - neuro consulted    #Orthostatic Hypotension  - Echo 6/21/21 EF 55%, mod MR, mild, TR, mild pulm HTN  - cont with IVF NS @75  - decrease nifedipine     #Respiratory Alkalosis    #Hx of Ovarian CA  - Dx in April 2021  - As per pt, Last chemo 6/30 with carboplatin/taxol for total of 3 rounds    #HLD  - cont atovastatin  - lipid panel pending    #HTN  - reduced nifedipine to 30mg qd in setting of orthostasis    #DM Type 2  - hold any oral DM meds, pt on tresiba 30u qhs at home  - start basal insulin now that pt cleared by speech to eat, lantus 10u  - on ISS  - a1c pending    #DVT ppx  - pt with hx of DVT on xarelto   CHIEF COMPLAINT/DIAGNOSIS: facial droop, slurred speech and Right sided weakness    HPI:  76/ F with PMHx of ovarian CA ([facility] patient, Dxs in April 2021, on Chemo no radiation), DVT on Xarelto, HTN, HLD and asthma , DM, chronic back pain secondary to herniated disc presents to the ED c/o facial droop, slurred speech and Right sided weakness. Per pt's son, pt was outside with son when pt c/o R sided weakness on lower and upper extremities in addition to R sided facial droop. Son brought mom to hospital immediately. Pt is on Xarelto since pt had similar symptoms and was evaluated for a TIA 2 weeks ago and saw Dr. Lopez who put her on an anticoagulant??. Work up included: CTH- negative for acute ischemia, EEG- neg for seizure, MR brain- No acute or subacute stroke. Pt took Xarelto this morning as the most recent dose. Currently states that there is a slight improvement of extremities. Pt was seen y neuro and deemed not to be tPA candidate as on xarelto.     7/7: Pt assessed at bedside. States that her difficulty speaking and weakness has improved. c/o of nausea. Denies chest pain, SOB.     REVIEW OF SYSTEMS:  All other review of systems is negative unless indicated above    PHYSICAL EXAM:  Constitutional: NAD, awake and alert  HEENT: PERR, EOMI, Normal Hearing, MMM  Neck: Soft and supple, No LAD, No JVD  Respiratory: Breath sounds are clear bilaterally, No wheezing, rales or rhonchi  Cardiovascular: S1 and S2, regular rate and rhythm, no Murmurs, gallops or rubs  Gastrointestinal: Bowel Sounds present, soft, nontender, nondistended, no guarding, no rebound  Extremities: No peripheral edema  Vascular: 2+ peripheral pulses  Neurological: A/O x 3, no gross focal deficits, pronator drift neg. fluent speech, no facial droop, EOMI. moves all extremities, finger to nose test intact  Musculoskeletal: 5/5 strength b/l upper and lower extremities  Skin: No rashes    Vital Signs Last 24 Hrs  T(C): 36.8 (07 Jul 2021 07:14), Max: 37.2 (06 Jul 2021 20:52)  T(F): 98.2 (07 Jul 2021 07:14), Max: 98.9 (06 Jul 2021 20:52)  HR: 70 (07 Jul 2021 07:14) (70 - 93)  BP: 138/65 (07 Jul 2021 07:14) (116/61 - 160/89)  BP(mean): 87 (06 Jul 2021 18:00) (72 - 116)  RR: 16 (07 Jul 2021 07:14) (15 - 22)  SpO2: 96% (07 Jul 2021 07:14) (96% - 100%)    LABS: All Labs Reviewed:                        10.5   3.36  )-----------( 226      ( 07 Jul 2021 09:52 )             34.2     07-07    138  |  106  |  19  ----------------------------<  138<H>  3.9   |  26  |  0.86    Ca    8.7      07 Jul 2021 09:52  Mg     1.7     07-07    TPro  6.0  /  Alb  2.8<L>  /  TBili  0.7  /  DBili  x   /  AST  21  /  ALT  24  /  AlkPhos  83  07-07    PT/INR - ( 06 Jul 2021 14:39 )   PT: 15.1 sec;   INR: 1.32 ratio       PTT - ( 06 Jul 2021 14:39 )  PTT:28.3 sec  CARDIAC MARKERS ( 06 Jul 2021 14:39 )  <0.015 ng/mL / x     / x     / x     / x        Blood Culture: N/A    RADIOLOGY:  < from: CT Brain Stroke Protocol (07.06.21 @ 14:33) >  IMPRESSION: Atrophy and small vessel white matter ischemic changes. No change since 6/20/2021. No hemorrhage.  CT perfusion demonstrates 2 small areas at risk for infarction, in the temporal regions bilaterally.  CTA is limited by motion but demonstrates no large vessel occlusion.  < end of copied text >    ECHOCARDIOGRAM: pending    MEDICATIONS:  MEDICATIONS  (STANDING):  ALPRAZolam 0.25 milliGRAM(s) Oral once  aspirin  chewable 81 milliGRAM(s) Oral daily  atorvastatin 80 milliGRAM(s) Oral at bedtime  dextrose 40% Gel 15 Gram(s) Oral once  dextrose 5% + sodium chloride 0.9%. 1000 milliLiter(s) (83 mL/Hr) IV Continuous <Continuous>  dextrose 5%. 1000 milliLiter(s) (50 mL/Hr) IV Continuous <Continuous>  dextrose 5%. 1000 milliLiter(s) (100 mL/Hr) IV Continuous <Continuous>  dextrose 50% Injectable 25 Gram(s) IV Push once  dextrose 50% Injectable 12.5 Gram(s) IV Push once  dextrose 50% Injectable 25 Gram(s) IV Push once  gabapentin 300 milliGRAM(s) Oral three times a day  glucagon  Injectable 1 milliGRAM(s) IntraMuscular once  insulin lispro (ADMELOG) corrective regimen sliding scale   SubCutaneous at bedtime  insulin lispro (ADMELOG) corrective regimen sliding scale   SubCutaneous three times a day before meals  levothyroxine 112 MICROGram(s) Oral daily  losartan 50 milliGRAM(s) Oral daily  NIFEdipine XL 60 milliGRAM(s) Oral daily  pantoprazole    Tablet 40 milliGRAM(s) Oral before breakfast  rivaroxaban 20 milliGRAM(s) Oral daily    Home Medications:  aspirin 81 mg oral tablet: 1 tab(s) orally once a day (06 Jul 2021 17:43)  gabapentin 300 mg oral capsule: 1 cap(s) orally 3 times a day (06 Jul 2021 17:43)  levothyroxine 112 mcg (0.112 mg) oral tablet: 1 tab(s) orally once a day (06 Jul 2021 17:43)  losartan 50 mg oral tablet: 1 tab(s) orally once a day (06 Jul 2021 17:43)  NIFEdipine 30 mg oral tablet, extended release: 2 tab(s) orally once a day (06 Jul 2021 17:43)  NovoLOG FlexPen 100 units/mL injectable solution: Use as needed after chemo (06 Jul 2021 17:43)  pantoprazole 40 mg oral delayed release tablet: 1 tab(s) orally once a day (in the morning) (06 Jul 2021 17:43)  Pfizer-BioNTech COVID-19 Vaccine PF 30 mcg/0.3 mL intramuscular suspension: 0.3 milliliter(s) intramuscular once  ***2nd dose a few months ago*** (06 Jul 2021 17:43)  Tresiba 100 units/mL subcutaneous solution: 30 unit(s) subcutaneous once (at bedtime) (06 Jul 2021 17:43)  Xarelto 20 mg oral tablet: 1 tab(s) orally once a day (in the evening) (06 Jul 2021 17:43)    TELEMETRY REVIEW:  7/7: NSR 70s-80 with     ASSESSMENT AND PLAN:    #Stroke vs TIA  #Metabolic encephalopathy- resolved  - monitor on tele. tele review as above.  - CT perfusion: 2 small areas at risk for infarction, in the temporal regions bilaterally. No LVO  - MRI brain with contrast pending  - PT eval: rec home w/ assist  - Speech: no CI to regular diet  - cont ASA 81daily  - cont atorvastatin daily  - lipid panel pending  - a1c pending  - TTE pending  - EEG pending  - neuro consulted    #Orthostatic Hypotension  - Echo 6/21/21 EF 55%, mod MR, mild, TR, mild pulm HTN  - Cont. with IVF NS @75  7/7: decrease nifedipine, compression socks b/l    #Hx of Ovarian CA  - Dx in April 2021  - As per pt, Last chemo 6/30 with carboplatin/taxol for total of 3 rounds    #HLD  - cont atovastatin  - lipid panel pending    #HTN  - reduced nifedipine to 30mg qd in setting of orthostasis  - Cont. Losartan     #DM Type 2  - hold any oral DM meds, pt on Tresiba 30u qhs at home  - start basal insulin now that pt cleared by speech to eat, Lantus 10u  - on ISS  - a1c pending    #Respiratory Alkalosis, suspected r/t anxiety. respiratory status stable on RA    #DVT ppx  - pt with hx of DVT on Xarelto    Dispo: home w/ home PT

## 2021-07-07 NOTE — PHYSICAL THERAPY INITIAL EVALUATION ADULT - MODALITIES TREATMENT COMMENTS
pt left seated in chair post Eval; chair alarm donned; HM in place; callbell in reach; pt instructed not to get up alone; call nursing for assist; trae well; denied pain; RN Shira made aware pt OOB

## 2021-07-07 NOTE — DISCHARGE NOTE PROVIDER - NSDCCPCAREPLAN_GEN_ALL_CORE_FT
PRINCIPAL DISCHARGE DIAGNOSIS  Diagnosis: Acute metabolic encephalopathy  Assessment and Plan of Treatment: s/p chemo; now improving      SECONDARY DISCHARGE DIAGNOSES  Diagnosis: Neutropenia  Assessment and Plan of Treatment: follow neutropenic precautions as discussed. monitor for fevers, see  DR Murdock for bloodwork within a week. Return to ER if unwell.

## 2021-07-07 NOTE — SWALLOW BEDSIDE ASSESSMENT ADULT - SWALLOW EVAL: DIAGNOSIS
1) Pt exhibits functional Oropharyngeal Swallowing abilities for age without Dysphagia or aspiration signs. Odynophagia was denied.  2) Pt is alert and interactive.  Pt able to verbalize during communicative probes via intelligible, fluent, linguistically intact, contextually appropriate utterances. No speech-language pathology evident. Pt able to verbalize needs and is at reported communicative baseline.

## 2021-07-08 ENCOUNTER — OFFICE VISIT (OUTPATIENT)
Dept: URBAN - METROPOLITAN AREA CLINIC 72 | Facility: CLINIC | Age: 76
End: 2021-07-08

## 2021-07-08 LAB
ALBUMIN SERPL ELPH-MCNC: 3 G/DL — LOW (ref 3.3–5)
ALP SERPL-CCNC: 89 U/L — SIGNIFICANT CHANGE UP (ref 40–120)
ALT FLD-CCNC: 26 U/L — SIGNIFICANT CHANGE UP (ref 12–78)
ANION GAP SERPL CALC-SCNC: 7 MMOL/L — SIGNIFICANT CHANGE UP (ref 5–17)
AST SERPL-CCNC: 23 U/L — SIGNIFICANT CHANGE UP (ref 15–37)
BASOPHILS # BLD AUTO: 0.02 K/UL — SIGNIFICANT CHANGE UP (ref 0–0.2)
BASOPHILS NFR BLD AUTO: 1 % — SIGNIFICANT CHANGE UP (ref 0–2)
BILIRUB SERPL-MCNC: 0.4 MG/DL — SIGNIFICANT CHANGE UP (ref 0.2–1.2)
BUN SERPL-MCNC: 20 MG/DL — SIGNIFICANT CHANGE UP (ref 7–23)
CALCIUM SERPL-MCNC: 8.8 MG/DL — SIGNIFICANT CHANGE UP (ref 8.5–10.1)
CHLORIDE SERPL-SCNC: 107 MMOL/L — SIGNIFICANT CHANGE UP (ref 96–108)
CO2 SERPL-SCNC: 23 MMOL/L — SIGNIFICANT CHANGE UP (ref 22–31)
CREAT SERPL-MCNC: 0.83 MG/DL — SIGNIFICANT CHANGE UP (ref 0.5–1.3)
EOSINOPHIL # BLD AUTO: 0.09 K/UL — SIGNIFICANT CHANGE UP (ref 0–0.5)
EOSINOPHIL NFR BLD AUTO: 4 % — SIGNIFICANT CHANGE UP (ref 0–6)
GLUCOSE SERPL-MCNC: 172 MG/DL — HIGH (ref 70–99)
HCT VFR BLD CALC: 32.9 % — LOW (ref 34.5–45)
HGB BLD-MCNC: 10.2 G/DL — LOW (ref 11.5–15.5)
LYMPHOCYTES # BLD AUTO: 0.72 K/UL — LOW (ref 1–3.3)
LYMPHOCYTES # BLD AUTO: 32 % — SIGNIFICANT CHANGE UP (ref 13–44)
MCHC RBC-ENTMCNC: 25.5 PG — LOW (ref 27–34)
MCHC RBC-ENTMCNC: 31 GM/DL — LOW (ref 32–36)
MCV RBC AUTO: 82.3 FL — SIGNIFICANT CHANGE UP (ref 80–100)
MONOCYTES # BLD AUTO: 0.09 K/UL — SIGNIFICANT CHANGE UP (ref 0–0.9)
MONOCYTES NFR BLD AUTO: 4 % — SIGNIFICANT CHANGE UP (ref 2–14)
NEUTROPHILS # BLD AUTO: 1.27 K/UL — LOW (ref 1.8–7.4)
NEUTROPHILS NFR BLD AUTO: 56 % — SIGNIFICANT CHANGE UP (ref 43–77)
NRBC # BLD: SIGNIFICANT CHANGE UP /100 WBCS (ref 0–0)
PLATELET # BLD AUTO: 229 K/UL — SIGNIFICANT CHANGE UP (ref 150–400)
POTASSIUM SERPL-MCNC: 3.6 MMOL/L — SIGNIFICANT CHANGE UP (ref 3.5–5.3)
POTASSIUM SERPL-SCNC: 3.6 MMOL/L — SIGNIFICANT CHANGE UP (ref 3.5–5.3)
PROT SERPL-MCNC: 6.1 GM/DL — SIGNIFICANT CHANGE UP (ref 6–8.3)
RBC # BLD: 4 M/UL — SIGNIFICANT CHANGE UP (ref 3.8–5.2)
RBC # FLD: 18.3 % — HIGH (ref 10.3–14.5)
SODIUM SERPL-SCNC: 137 MMOL/L — SIGNIFICANT CHANGE UP (ref 135–145)
WBC # BLD: 2.26 K/UL — LOW (ref 3.8–10.5)
WBC # FLD AUTO: 2.26 K/UL — LOW (ref 3.8–10.5)

## 2021-07-08 PROCEDURE — 99232 SBSQ HOSP IP/OBS MODERATE 35: CPT

## 2021-07-08 PROCEDURE — 99223 1ST HOSP IP/OBS HIGH 75: CPT

## 2021-07-08 RX ORDER — SENNA PLUS 8.6 MG/1
2 TABLET ORAL AT BEDTIME
Refills: 0 | Status: DISCONTINUED | OUTPATIENT
Start: 2021-07-08 | End: 2021-07-12

## 2021-07-08 RX ORDER — FOLIC ACID 0.8 MG
1 TABLET ORAL DAILY
Refills: 0 | Status: DISCONTINUED | OUTPATIENT
Start: 2021-07-08 | End: 2021-07-12

## 2021-07-08 RX ORDER — POLYETHYLENE GLYCOL 3350 17 G/17G
17 POWDER, FOR SOLUTION ORAL DAILY
Refills: 0 | Status: DISCONTINUED | OUTPATIENT
Start: 2021-07-08 | End: 2021-07-12

## 2021-07-08 RX ORDER — OXYCODONE AND ACETAMINOPHEN 5; 325 MG/1; MG/1
1 TABLET ORAL EVERY 4 HOURS
Refills: 0 | Status: DISCONTINUED | OUTPATIENT
Start: 2021-07-08 | End: 2021-07-12

## 2021-07-08 RX ADMIN — SENNA PLUS 2 TABLET(S): 8.6 TABLET ORAL at 21:32

## 2021-07-08 RX ADMIN — PANTOPRAZOLE SODIUM 40 MILLIGRAM(S): 20 TABLET, DELAYED RELEASE ORAL at 06:26

## 2021-07-08 RX ADMIN — Medication 81 MILLIGRAM(S): at 10:08

## 2021-07-08 RX ADMIN — GABAPENTIN 300 MILLIGRAM(S): 400 CAPSULE ORAL at 21:32

## 2021-07-08 RX ADMIN — Medication 2: at 11:48

## 2021-07-08 RX ADMIN — Medication 30 MILLIGRAM(S): at 10:08

## 2021-07-08 RX ADMIN — OXYCODONE AND ACETAMINOPHEN 1 TABLET(S): 5; 325 TABLET ORAL at 12:54

## 2021-07-08 RX ADMIN — OXYCODONE AND ACETAMINOPHEN 1 TABLET(S): 5; 325 TABLET ORAL at 17:58

## 2021-07-08 RX ADMIN — Medication 2: at 08:32

## 2021-07-08 RX ADMIN — GABAPENTIN 300 MILLIGRAM(S): 400 CAPSULE ORAL at 13:11

## 2021-07-08 RX ADMIN — POLYETHYLENE GLYCOL 3350 17 GRAM(S): 17 POWDER, FOR SOLUTION ORAL at 13:14

## 2021-07-08 RX ADMIN — Medication 112 MICROGRAM(S): at 05:23

## 2021-07-08 RX ADMIN — OXYCODONE AND ACETAMINOPHEN 1 TABLET(S): 5; 325 TABLET ORAL at 13:18

## 2021-07-08 RX ADMIN — ATORVASTATIN CALCIUM 80 MILLIGRAM(S): 80 TABLET, FILM COATED ORAL at 21:32

## 2021-07-08 RX ADMIN — RIVAROXABAN 20 MILLIGRAM(S): KIT at 21:32

## 2021-07-08 RX ADMIN — ONDANSETRON 4 MILLIGRAM(S): 8 TABLET, FILM COATED ORAL at 19:33

## 2021-07-08 RX ADMIN — INSULIN GLARGINE 5 UNIT(S): 100 INJECTION, SOLUTION SUBCUTANEOUS at 21:32

## 2021-07-08 RX ADMIN — GABAPENTIN 300 MILLIGRAM(S): 400 CAPSULE ORAL at 06:26

## 2021-07-08 RX ADMIN — Medication 1 MILLIGRAM(S): at 10:08

## 2021-07-08 RX ADMIN — LOSARTAN POTASSIUM 50 MILLIGRAM(S): 100 TABLET, FILM COATED ORAL at 10:08

## 2021-07-08 NOTE — CONSULT NOTE ADULT - ASSESSMENT
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    Cancer of mullerian origin  Presently on carboplatinum, Taxol and bevacizumab  Most recent imaging has demonstrated response to treatment.  Unfortunately this is her second hospitalization last month  Plan was for patient to receive a total of 6 cycles of carboplatinum Taxol and bevacizumab prior to proceeding with surgery  Bevacizumab has been discontinued    Neuro: Right-sided facial weakness  As of this morning her symptoms have improved  She has undergone a CT of the head which did not demonstrate any evidence of acute infarct  The patient is planned for an MRI of the brain  Neurology is presently following  Patient was deemed not to be a TPA candidate due to ongoing Xarelto use  The patient is presently being medically optimized  
  HPI: Pt is a 76y old Female with hx of 76/ F with PMHx of ovarian CA ([facility] patient, Dxs in April 2021, on Chemo no radiation), DVT on Xarelto, HTN, HLD and asthma , DM, chronic back pain secondary to herniated disc presents to the ED c/o facial droop, slurred speech and Right sided weakness. Per pt's son, pt was outside with son when pt c/o R sided weakness on lower and upper extremities in addition to R sided facial droop. Pt is on Xarelto since pt had similar symptoms and was evaluated for a TIA 2 weeks ago and saw Dr. Lopez who put her on an anticoagulant. Pt currently on Xarelto. Symptoms have resolved and work up is in progress. Palliative Medicine Consult to establish GOC.  7/8/21 Seen and examined at bedside with daughter in law present. Pt was just medicated for back and generalized pain and is sleepy. Medical hx provided by DIL.     Assessment and Plan:    1) TIA vs STROKE  -Ct head no acute findings  -Pt is not a candidate for t-PA as she took her Xarelto and aspirin last night.   -MRI brain pending  -MRI 6/22 noted  -Resolution of symptoms  -Neuro eval noted    2) Pain  -chronic back pain R/T herniated discs  -Generalized bone pain  -Neuropathy  pain R/T DM  -Cont Percocet 5 mg PO Q4H PRN/initiated today    3) Depression  -Situational  -Loss of independence  -Cancer diagnosis  -Pain  -Psych eval pending    4) Ovarian Cancer  -Diagnosed April 21  -Currently receiving chemo [facility]  -As per DIL responding to treatment  -Onc eval noted    5) Advanced Directives  -Pt with capacity  -Son Mayur named HCP  -Will schedule GOC discussion with family and pt    
Pt is a 76 year old woman with a PMH of metastatic ovarian cancer, followed by [facility], asthma, DVT on Xarelto HTN, HLD, recent TIA s/p admission at  6/20-6/23  She presents to the ED today with acute onset of right sided weakness.   As per the son at bedside, pt was a that the park when she started to c/o back pain, right sided weakness, first in the arm and then spreading to the leg and then facial numbness, pt was unable to walk to the cart. Pt then began to develop dysarthria and expressive aphasia. Pt arrive in the ED and a CODE STROKE was called at 2:23pm and the pt was taken to the CT scanner. CT is negative for acute stroke or hemorrhage, CTA is negative for large vessel occlusion.   Pt was difficult to examine as she was very emotional and either couldn't or didn't want to follow commands. She could not answer any questions, no clear verbal output, no gross facial asymmetry, moving all extremities antigravity, but is moving the left side more than the right, unable to test dysmetria, sensation appears intact. NIHSS 8 at minimum, unable to complete exam. Pt is not a candidate for t-PA as she took her Xarelto and aspirin last night.     #TIA vs stroke vs metabolic encephalopathy    admit to medicine  MR brain  evaluation of metastatic disease    Discussed with Dr. Martinez

## 2021-07-08 NOTE — CONSULT NOTE ADULT - SUBJECTIVE AND OBJECTIVE BOX
HPI: Pt is a 76y old Female with hx of 76/ F with PMHx of ovarian CA ([facility] patient, Dxs in April 2021, on Chemo no radiation), DVT on Xarelto, HTN, HLD and asthma , DM, chronic back pain secondary to herniated disc presents to the ED c/o facial droop, slurred speech and Right sided weakness. Per pt's son, pt was outside with son when pt c/o R sided weakness on lower and upper extremities in addition to R sided facial droop. Pt is on Xarelto since pt had similar symptoms and was evaluated for a TIA 2 weeks ago and saw Dr. Lopez who put her on an anticoagulant. Pt currently on Xarelto. Symptoms have resolved and work up is in progress. Palliative Medicine Consult to establish GOC.  7/8/21 Seen and examined at bedside with daughter in law present. Pt was just medicated for back and generalized pain and is sleepy. Medical hx provided by DIL.     PAIN: (X )Yes   ( )No  Level: mod-severe  Location: back/bone  Intensity:   8/10  Quality: ache  Aggravating Factors: ?  Alleviating Factors: Motrin with some relief  Impact on ADLs: mod    DYSPNEA: ( ) Yes  (X ) No  Level:    PAST MEDICAL & SURGICAL HISTORY:  Ovarian cancer  Asthma  HLD (hyperlipidemia)  DVT (deep venous thrombosis)  HTN (hypertension)    No significant past surgical history        SOCIAL HX:  Was living independently in [place]  Living with sons  Hx opiate tolerance ( )YES  (X )NO    Baseline ADLs  (Prior to Admission)  (X ) Independent   ( )Dependent    FAMILY HISTORY:  Family history unobtainable due to patient condition/lethargy        Review of Systems:    Anxiety-yes  Depression-yes  Physical Discomfort-mod  Dyspnea-denies  Constipation-yes  Anorexia-mild  Fatigue-mod  Weakness-mod  Delirium-no    All other systems reviewed and negative        PHYSICAL EXAM:    Vital Signs Last 24 Hrs  T(C): 36.5 (08 Jul 2021 08:12), Max: 36.9 (07 Jul 2021 20:29)  T(F): 97.7 (08 Jul 2021 08:12), Max: 98.4 (07 Jul 2021 20:29)  HR: 72 (08 Jul 2021 08:12) (72 - 75)  BP: 113/51 (08 Jul 2021 08:12) (113/51 - 159/68)  RR: 18 (08 Jul 2021 08:12) (17 - 18)  SpO2: 100% (08 Jul 2021 08:12) (97% - 100%)    PPSV2: 40-50 %    General: Elderly female in bed in mild distress  Mental Status: A&O X3  HEENT: oral mucosa dry  Lungs: clear to auscultation jazzmine  Cardiac: S1S2+  GI: abd soft NT ND + BS  : voids  Ext: MERIDA = strength  Neuro: no focal def      LABS:                        10.2   2.26  )-----------( 229      ( 08 Jul 2021 07:27 )             32.9     07-08    137  |  107  |  20  ----------------------------<  172<H>  3.6   |  23  |  0.83    Ca    8.8      08 Jul 2021 07:27  Mg     1.7     07-07    TPro  6.1  /  Alb  3.0<L>  /  TBili  0.4  /  DBili  x   /  AST  23  /  ALT  26  /  AlkPhos  89  07-08    PT/INR - ( 06 Jul 2021 14:39 )   PT: 15.1 sec;   INR: 1.32 ratio         PTT - ( 06 Jul 2021 14:39 )  PTT:28.3 sec  Albumin: Albumin, Serum: 3.0 g/dL (07-08 @ 07:27)      Allergies    Betadine (Unknown)  tetracycline (Unknown)    Intolerances      MEDICATIONS  (STANDING):  aspirin  chewable 81 milliGRAM(s) Oral daily  atorvastatin 80 milliGRAM(s) Oral at bedtime  dextrose 40% Gel 15 Gram(s) Oral once  dextrose 5%. 1000 milliLiter(s) (50 mL/Hr) IV Continuous <Continuous>  dextrose 5%. 1000 milliLiter(s) (100 mL/Hr) IV Continuous <Continuous>  dextrose 50% Injectable 25 Gram(s) IV Push once  dextrose 50% Injectable 12.5 Gram(s) IV Push once  dextrose 50% Injectable 25 Gram(s) IV Push once  folic acid 1 milliGRAM(s) Oral daily  gabapentin 300 milliGRAM(s) Oral three times a day  glucagon  Injectable 1 milliGRAM(s) IntraMuscular once  insulin glargine Injectable (LANTUS) 5 Unit(s) SubCutaneous at bedtime  insulin lispro (ADMELOG) corrective regimen sliding scale   SubCutaneous at bedtime  insulin lispro (ADMELOG) corrective regimen sliding scale   SubCutaneous three times a day before meals  levothyroxine 112 MICROGram(s) Oral daily  LORazepam   Injectable 0.5 milliGRAM(s) IV Push once  losartan 50 milliGRAM(s) Oral daily  NIFEdipine XL 30 milliGRAM(s) Oral daily  pantoprazole    Tablet 40 milliGRAM(s) Oral before breakfast  polyethylene glycol 3350 17 Gram(s) Oral daily  rivaroxaban 20 milliGRAM(s) Oral daily  senna 2 Tablet(s) Oral at bedtime    MEDICATIONS  (PRN):  ondansetron Injectable 4 milliGRAM(s) IV Push every 6 hours PRN Nausea  oxycodone    5 mG/acetaminophen 325 mG 1 Tablet(s) Oral every 4 hours PRN Moderate Pain (4 - 6)      RADIOLOGY/ADDITIONAL STUDIES:  < from: Xray Chest 1 View- PORTABLE-Urgent (07.06.21 @ 14:49) >    EXAM:  XR CHEST PORTABLE URGENT 1V                            PROCEDURE DATE:  07/06/2021          INTERPRETATION:  INDICATION: Stroke Code    PRIORS: 6/20/2021.    VIEWS: Portable AP radiography of the chest performed.    FINDINGS: Heart size appears within normal limits. No hilar or superior mediastinal abnormalities are identified. There is no evidence for focal infiltrate, lobar consolidation or pulmonary edema. No pleural effusion or pneumothorax is demonstrated. No mediastinal shift is noted. The visualized osseous structures appear unremarkable.    IMPRESSION: No evidence for focal infiltrate or lobar consolidation.    < from: CT Angio Neck w/ IV Cont (07.06.21 @ 14:43) >    EXAM:  CT PERFUSION W MAPS IC                          EXAM:  CT ANGIO BRAIN (W)AW IC                          EXAM:  CT BRAIN STROKE PROTOCOL                          EXAM:  CT ANGIO NECK (W)AW IC                            PROCEDURE DATE:  07/06/2021          INTERPRETATION:  Clinical Indication: Code stroke, slurred speech and right-sided weakness    5mm axial sections of the brain were obtained from base to vertex, without the intravenous administration of contrast material. Coronal and sagittal computer generated reconstructed views are available.    Comparison is made with the prior CT of 6/20/2021 and demonstrates no significant interval change.    AI evaluation for hemorrhage demonstrates no hemorrhage suspected.      The ventriclesand sulci are prominent consistent age appropriate involutional changes. Small vessel white matter ischemic changes are noted. There is no hemorrhage, mass, or shift of midline structures. Bone window examination is unremarkable. There has been previous bilateral lens replacement surgery.    CT PERFUSION:    After the intravenous administration of 50 cc of Omnipaque 300 350 serial thin sections were obtained through the brain for the purposes of evaluating CT perfusion. Raw data was sent to the rapid ischemia view software for postprocessing. There is no core infarct. There are 2 small areas of delayed mean transit time in the left temporal and right occipital regions.      CBF<30% volume: 0 ml  Tmax> 6.0s volume: 17 ml  Mismatch volume: 17 ml  Mismatch ratio: infinite    CTA head and neck:      After the intravenous power injection of 70 of Omnipaque 300 using a bolus solis timing run, serial thin sections were obtained through the intracranial circulation centered at the [id number] on a multi slice CT scanner reformatted with coronal and sagittal 2 D-MIP projections, including 3 D reconstructions using a separate 3D [employer] software workstation.A total of 120 cc of Omnipaque were intravenously injected. 30 cc were discarded .          Examination is limited by motion artifact.    The origins of the carotid and vertebral arteries are normal. The carotid bifurcations are patent. The vertebral arteries are codominant.    The distal vertebral arteries, basilar artery and posterior cerebral arteries are visualized. The left P1 segment is hypoplastic and there is a prominent P-comm identified.    Evaluation of the carotid arteries demonstrate normal appearance to the cervical, petrous cavernous and supraclinoid internal carotid arteries. The anterior cerebral arteries anterior communicating artery and posterior cerebral arteries are visualized.    No large vessel occlusion is identified.      The intracranial venous circulation is unremarkable.      IMPRESSION: Atrophy and small vessel white matter ischemic changes. No change since 6/20/2021. No hemorrhage.    CT perfusion demonstrates 2 small areas at risk for infarction, in the temporal regions bilaterally.     CTA is limited by motion but demonstrates no large vessel occlusion.    Dr. Dupree discussed these findings with ED provider on 7/6/2021 2:42 PM with read back.

## 2021-07-08 NOTE — PROGRESS NOTE ADULT - SUBJECTIVE AND OBJECTIVE BOX
INPATIENT PROGRESS NOTE    CLINICAL PROBLEMS:    1.  This is a 73-year-old patient with a history of a DVT diagnosed in 2017 present a anchor anticoagulation with Xarelto  2. Cancer of mullerian origin     24 HOUR EVENTS:  Patient is markedly depressed and has a depressed affect during today's visit.  She notes that she is quite overwhelmed with recurrent hospitalizations as well as her overall disease status.  The patient was contacted by [facility] program and family members mentioned that they were concerned regarding her as severe depressive symptoms.  Yesterday underwent MRI of the brain yet was unable to tolerate procedure due to severe claustrophobia    EXAMINATION:  Patient sitting upright in chair no facial asymmetry no focal deficits noted on exam affect appear to be despondent and depressed no lower extremity edema no speech difficulties neurologic leave appears grossly intact    PERTINENT DATA:  MRI still pending    ASSESSMENT:  76-year-old female with history of ovarian cancer malarian origin status post 3 cycles of carboplatin and paclitaxel plan to undergo as cytoreductive treatment after completion of 6 cycles now admitted for questionable CVA/TIA symptoms known    PLAN:    1.  Depression-would recommend palliative care and psych evaluation  2. Patient now status post 3 cycles of carboplatin and paclitaxel will reassess patient's candidacy and discharge; discussed case with patient's primary oncologist Dr. Murdock   3. CVA/TIA-neurology presently following CT did not demonstrate any acute findings; MRI of the brain will be postponed as the patient was severely claustrophobic and unable to tolerate MRI, would recommend ongoing medical management continue with statins anti coagulation.  4. Discussed case with presidiing NP early this a.m.

## 2021-07-08 NOTE — PROGRESS NOTE ADULT - SUBJECTIVE AND OBJECTIVE BOX
· Reason for Admission	Confusion  7/8/21 : Pt feeling anxious, c/o pain. No weakness. MRI not done due to anxiety .    MEDICATIONS  (STANDING):  aspirin  chewable 81 milliGRAM(s) Oral daily  atorvastatin 80 milliGRAM(s) Oral at bedtime  dextrose 40% Gel 15 Gram(s) Oral once  dextrose 5%. 1000 milliLiter(s) (50 mL/Hr) IV Continuous <Continuous>  dextrose 5%. 1000 milliLiter(s) (100 mL/Hr) IV Continuous <Continuous>  dextrose 50% Injectable 25 Gram(s) IV Push once  dextrose 50% Injectable 12.5 Gram(s) IV Push once  dextrose 50% Injectable 25 Gram(s) IV Push once  folic acid 1 milliGRAM(s) Oral daily  gabapentin 300 milliGRAM(s) Oral three times a day  glucagon  Injectable 1 milliGRAM(s) IntraMuscular once  insulin glargine Injectable (LANTUS) 5 Unit(s) SubCutaneous at bedtime  insulin lispro (ADMELOG) corrective regimen sliding scale   SubCutaneous at bedtime  insulin lispro (ADMELOG) corrective regimen sliding scale   SubCutaneous three times a day before meals  levothyroxine 112 MICROGram(s) Oral daily  LORazepam   Injectable 0.5 milliGRAM(s) IV Push once  losartan 50 milliGRAM(s) Oral daily  NIFEdipine XL 30 milliGRAM(s) Oral daily  pantoprazole    Tablet 40 milliGRAM(s) Oral before breakfast  rivaroxaban 20 milliGRAM(s) Oral daily  sodium chloride 0.9%. 1000 milliLiter(s) (75 mL/Hr) IV Continuous <Continuous>      Vital Signs Last 24 Hrs  T(C): 36.5 (08 Jul 2021 08:12), Max: 36.9 (07 Jul 2021 20:29)  T(F): 97.7 (08 Jul 2021 08:12), Max: 98.4 (07 Jul 2021 20:29)  HR: 72 (08 Jul 2021 08:12) (72 - 75)  BP: 113/51 (08 Jul 2021 08:12) (113/51 - 159/68)  BP(mean): --  RR: 18 (08 Jul 2021 08:12) (17 - 18)  SpO2: 100% (08 Jul 2021 08:12) (97% - 100%)      Neurological Examination:  NIHSS: 0  MS: AOx3. Appropriately interactive, normal affect. Speech fluent w/o paraphasic error, follows commands   CN: PERLL, EOMI, V1-3 sensation intact, face symmetric, hearing intact, palate elevates symmetrically, tongue midline, SCM equal bilaterally  Motor: normal bulk and tone, no tremor, rigidity or bradykinesia.  LEs proximal  otherwise 5/5 all over, no pronator drift  Sens: Intact to light touch.    Reflexes: 0/4 all over, downgoing toes b/l  Coord:  No dysmetria, BRISSA intact   Gait: Intact                      10.2   2.26  )-----------( 229      ( 08 Jul 2021 07:27 )             32.9     07-08    137  |  107  |  20  ----------------------------<  172<H>  3.6   |  23  |  0.83    Ca    8.8      08 Jul 2021 07:27  Mg     1.7     07-07    TPro  6.1  /  Alb  3.0<L>  /  TBili  0.4  /  DBili  x   /  AST  23  /  ALT  26  /  AlkPhos  89  07-08      07-07 Chol 127 LDL -- HDL 33<L> Trig 120, 06-21 Chol 110 LDL -- HDL 36<L> Trig 104    Radiology report:  - CT Head  - CT Head  IMPRESSION: Atrophy and small vessel white matter ischemic changes. No change since 6/20/2021. No hemorrhage.  CT perfusion demonstrates 2 small areas at risk for infarction, in the temporal regions bilaterally.  CTA is limited by motion but demonstrates no large vessel occlusion.  EEG :  < from: EEG Awake or Drowsy (07.07.21 @ 14:00) >  Impression:  This is mildly abnormal EEG due to the presence of mild generalized slowing may be on the basis of metabolic, toxic or structural abnormality. No epileptiform activity noted. Clinical correlation recommended.    < end of copied text >

## 2021-07-08 NOTE — PROGRESS NOTE ADULT - ASSESSMENT
· Assessment	  Pt is a 76 year old woman with a PMH of metastatic ovarian cancer, followed by [facility], asthma, DVT on Xarelto HTN, HLD, recent TIA s/p admission at  6/20-6/23  She presents to the ED today with acute onset of right sided weakness.   As per the son at bedside, pt was a that the park when she started to c/o back pain, right sided weakness, first in the arm and then spreading to the leg and then facial numbness, pt was unable to walk to the cart. Pt then began to develop dysarthria and expressive aphasia. Pt arrive in the ED and a CODE STROKE was called at 2:23pm and the pt was taken to the CT scanner. CT is negative for acute stroke or hemorrhage, CTA is negative for large vessel occlusion.     #TIA vs stroke vs metabolic encephalopathy    admit to medicine  -MR brain with contrast pending   -Continue Xarelto with Aspirin  -evaluation of metastatic disease  -PT eval  -Hyper coag w/u  -Follow up with oncology  -EEG mild slow  -Discussed with Pt and HUGO Lopes

## 2021-07-08 NOTE — PROGRESS NOTE ADULT - SUBJECTIVE AND OBJECTIVE BOX
CHIEF COMPLAINT/DIAGNOSIS: facial droop, slurred speech and Right sided weakness    HPI:  76/ F with PMHx of ovarian CA ([facility] patient, Dxs in April 2021, on Chemo no radiation), DVT on Xarelto, HTN, HLD and asthma , DM, chronic back pain secondary to herniated disc presents to the ED c/o facial droop, slurred speech and Right sided weakness. Per pt's son, pt was outside with son when pt c/o R sided weakness on lower and upper extremities in addition to R sided facial droop. Son brought mom to hospital immediately. Pt is on Xarelto since pt had similar symptoms and was evaluated for a TIA 2 weeks ago and saw Dr. Lopez who put her on an anticoagulant??. Work up included: CTH- negative for acute ischemia, EEG- neg for seizure, MR brain- No acute or subacute stroke. Pt took Xarelto this morning as the most recent dose. Currently states that there is a slight improvement of extremities. Pt was seen y neuro and deemed not to be tPA candidate as on xarelto.     7/7: Pt assessed at bedside. States that her difficulty speaking and weakness has improved. c/o of nausea. Denies chest pain, SOB.   7/8:    REVIEW OF SYSTEMS:  All other review of systems is negative unless indicated above    PHYSICAL EXAM:  Constitutional: NAD, awake and alert  HEENT: PERR, EOMI, Normal Hearing, MMM  Neck: Soft and supple, No LAD, No JVD  Respiratory: Breath sounds are clear bilaterally, No wheezing, rales or rhonchi  Cardiovascular: S1 and S2, regular rate and rhythm, no Murmurs, gallops or rubs  Gastrointestinal: Bowel Sounds present, soft, nontender, nondistended, no guarding, no rebound  Extremities: No peripheral edema  Vascular: 2+ peripheral pulses  Neurological: A/O x 3, no gross focal deficits, pronator drift neg. fluent speech, no facial droop, EOMI. moves all extremities, finger to nose test intact  Musculoskeletal: 5/5 strength b/l upper and lower extremities  Skin: No rashes    Vital Signs Last 24 Hrs  T(C): 36.5 (08 Jul 2021 08:12), Max: 36.9 (07 Jul 2021 20:29)  T(F): 97.7 (08 Jul 2021 08:12), Max: 98.4 (07 Jul 2021 20:29)  HR: 72 (08 Jul 2021 08:12) (72 - 75)  BP: 113/51 (08 Jul 2021 08:12) (113/51 - 159/68)  BP(mean): --  RR: 18 (08 Jul 2021 08:12) (17 - 18)  SpO2: 100% (08 Jul 2021 08:12) (97% - 100%)    LABS: All Labs Reviewed:                        10.2   2.26  )-----------( 229      ( 08 Jul 2021 07:27 )             32.9     07-07    138  |  106  |  19  ----------------------------<  138<H>  3.9   |  26  |  0.86    Ca    8.7      07 Jul 2021 09:52  Mg     1.7     07-07    TPro  6.0  /  Alb  2.8<L>  /  TBili  0.7  /  DBili  x   /  AST  21  /  ALT  24  /  AlkPhos  83  07-07    CARDIAC MARKERS ( 06 Jul 2021 14:39 )  <0.015 ng/mL / x     / x     / x     / x        LIVER FUNCTIONS - ( 07 Jul 2021 09:52 )  Alb: 2.8 g/dL / Pro: 6.0 gm/dL / ALK PHOS: 83 U/L / ALT: 24 U/L / AST: 21 U/L / GGT: x           PT/INR - ( 06 Jul 2021 14:39 )   PT: 15.1 sec;   INR: 1.32 ratio         PTT - ( 06 Jul 2021 14:39 )  PTT:28.3 sec      Blood Culture: N/A    RADIOLOGY:  < from: CT Brain Stroke Protocol (07.06.21 @ 14:33) >  IMPRESSION: Atrophy and small vessel white matter ischemic changes. No change since 6/20/2021. No hemorrhage.  CT perfusion demonstrates 2 small areas at risk for infarction, in the temporal regions bilaterally.  CTA is limited by motion but demonstrates no large vessel occlusion.  < end of copied text >    ECHOCARDIOGRAM: pending    MEDICATIONS:  MEDICATIONS  (STANDING):  ALPRAZolam 0.25 milliGRAM(s) Oral once  aspirin  chewable 81 milliGRAM(s) Oral daily  atorvastatin 80 milliGRAM(s) Oral at bedtime  dextrose 40% Gel 15 Gram(s) Oral once  dextrose 5% + sodium chloride 0.9%. 1000 milliLiter(s) (83 mL/Hr) IV Continuous <Continuous>  dextrose 5%. 1000 milliLiter(s) (50 mL/Hr) IV Continuous <Continuous>  dextrose 5%. 1000 milliLiter(s) (100 mL/Hr) IV Continuous <Continuous>  dextrose 50% Injectable 25 Gram(s) IV Push once  dextrose 50% Injectable 12.5 Gram(s) IV Push once  dextrose 50% Injectable 25 Gram(s) IV Push once  gabapentin 300 milliGRAM(s) Oral three times a day  glucagon  Injectable 1 milliGRAM(s) IntraMuscular once  insulin lispro (ADMELOG) corrective regimen sliding scale   SubCutaneous at bedtime  insulin lispro (ADMELOG) corrective regimen sliding scale   SubCutaneous three times a day before meals  levothyroxine 112 MICROGram(s) Oral daily  losartan 50 milliGRAM(s) Oral daily  NIFEdipine XL 60 milliGRAM(s) Oral daily  pantoprazole    Tablet 40 milliGRAM(s) Oral before breakfast  rivaroxaban 20 milliGRAM(s) Oral daily    Home Medications:  aspirin 81 mg oral tablet: 1 tab(s) orally once a day (06 Jul 2021 17:43)  gabapentin 300 mg oral capsule: 1 cap(s) orally 3 times a day (06 Jul 2021 17:43)  levothyroxine 112 mcg (0.112 mg) oral tablet: 1 tab(s) orally once a day (06 Jul 2021 17:43)  losartan 50 mg oral tablet: 1 tab(s) orally once a day (06 Jul 2021 17:43)  NIFEdipine 30 mg oral tablet, extended release: 2 tab(s) orally once a day (06 Jul 2021 17:43)  NovoLOG FlexPen 100 units/mL injectable solution: Use as needed after chemo (06 Jul 2021 17:43)  pantoprazole 40 mg oral delayed release tablet: 1 tab(s) orally once a day (in the morning) (06 Jul 2021 17:43)  Pfizer-BioNTech COVID-19 Vaccine PF 30 mcg/0.3 mL intramuscular suspension: 0.3 milliliter(s) intramuscular once  ***2nd dose a few months ago*** (06 Jul 2021 17:43)  Tresiba 100 units/mL subcutaneous solution: 30 unit(s) subcutaneous once (at bedtime) (06 Jul 2021 17:43)  Xarelto 20 mg oral tablet: 1 tab(s) orally once a day (in the evening) (06 Jul 2021 17:43)    TELEMETRY REVIEW:  7/7: NSR 70s-80   7/8:    ASSESSMENT AND PLAN:    #Stroke vs TIA  #Metabolic encephalopathy- resolved  - monitor on tele. tele review as above.  - CT perfusion: 2 small areas at risk for infarction, in the temporal regions bilaterally. No LVO  - MRI brain with contrast pending  - PT eval: rec home w/ assist  - Speech: no CI to regular diet  - cont ASA 81daily  - cont atorvastatin daily  - lipid panel  - a1c pending  - TTE pending  - EEG - mild abnormal diffuse slowing  - neuro consulted    #Orthostatic Hypotension  - Echo 6/21/21 EF 55%, mod MR, mild, TR, mild pulm HTN  - Cont. with IVF NS @75  7/7: decrease nifedipine, compression socks b/l  - recheck orthostatics    #Hx of Ovarian CA  - leukopenia worsening  - Dx in April 2021  - As per pt, Last chemo 6/30 with carboplatin/taxol for total of 3 rounds  - heme consulted    #Depression  - palliative consult for depressive sxs and pain pending    #HLD  - cont atovastatin  - lipid panel pending    #HTN  - reduced nifedipine to 30mg qd in setting of orthostasis  - Cont. Losartan     #DM Type 2  - hold any oral DM meds, pt on Tresiba 30u qhs at home  - lantus 5u bedtime  - on ISS  - a1c 6.6     #Respiratory Alkalosis, suspected r/t anxiety. respiratory status stable on RA    #DVT ppx  - pt with hx of DVT on Xarelto    Dispo: home w/ home PT CHIEF COMPLAINT/DIAGNOSIS: facial droop, slurred speech and Right sided weakness    HPI:  76/ F with PMHx of ovarian CA ([facility] patient, Dxs in April 2021, on Chemo no radiation), DVT on Xarelto, HTN, HLD and asthma , DM, chronic back pain secondary to herniated disc presents to the ED c/o facial droop, slurred speech and Right sided weakness. Per pt's son, pt was outside with son when pt c/o R sided weakness on lower and upper extremities in addition to R sided facial droop. Son brought mom to hospital immediately. Pt is on Xarelto since pt had similar symptoms and was evaluated for a TIA 2 weeks ago and saw Dr. Lopez who put her on an anticoagulant??. Work up included: CTH- negative for acute ischemia, EEG- neg for seizure, MR brain- No acute or subacute stroke. Pt took Xarelto this morning as the most recent dose. Currently states that there is a slight improvement of extremities. Pt was seen y neuro and deemed not to be tPA candidate as on xarelto.     7/7: Pt assessed at bedside. States that her difficulty speaking and weakness has improved. c/o of nausea. Denies chest pain, SOB.   7/8: Pt states that shes depressed. Reports that she was unable to have the MRI done because of feeling very claustrophobic and anxious and states that the xanax did not help. Pt also c/o constant epigastric abd pain and then intermittent sharp abd     REVIEW OF SYSTEMS:  All other review of systems is negative unless indicated above    PHYSICAL EXAM:  Constitutional: NAD, awake and alert  HEENT: PERR, EOMI, Normal Hearing, MMM  Neck: Soft and supple, No LAD, No JVD  Respiratory: Breath sounds are clear bilaterally, No wheezing, rales or rhonchi  Cardiovascular: S1 and S2, regular rate and rhythm, no Murmurs, gallops or rubs  Gastrointestinal: Bowel Sounds present, soft, nontender, nondistended, no guarding, no rebound  Extremities: No peripheral edema  Vascular: 2+ peripheral pulses  Neurological: A/O x 3, no gross focal deficits, pronator drift neg. fluent speech, no facial droop, EOMI. moves all extremities, finger to nose test intact  Musculoskeletal: 5/5 strength b/l upper and lower extremities  Skin: No rashes    Vital Signs Last 24 Hrs  T(C): 36.5 (08 Jul 2021 08:12), Max: 36.9 (07 Jul 2021 20:29)  T(F): 97.7 (08 Jul 2021 08:12), Max: 98.4 (07 Jul 2021 20:29)  HR: 72 (08 Jul 2021 08:12) (72 - 75)  BP: 113/51 (08 Jul 2021 08:12) (113/51 - 159/68)  BP(mean): --  RR: 18 (08 Jul 2021 08:12) (17 - 18)  SpO2: 100% (08 Jul 2021 08:12) (97% - 100%)    LABS: All Labs Reviewed:                        10.2   2.26  )-----------( 229      ( 08 Jul 2021 07:27 )             32.9     07-07    138  |  106  |  19  ----------------------------<  138<H>  3.9   |  26  |  0.86    Ca    8.7      07 Jul 2021 09:52  Mg     1.7     07-07    TPro  6.0  /  Alb  2.8<L>  /  TBili  0.7  /  DBili  x   /  AST  21  /  ALT  24  /  AlkPhos  83  07-07    CARDIAC MARKERS ( 06 Jul 2021 14:39 )  <0.015 ng/mL / x     / x     / x     / x        LIVER FUNCTIONS - ( 07 Jul 2021 09:52 )  Alb: 2.8 g/dL / Pro: 6.0 gm/dL / ALK PHOS: 83 U/L / ALT: 24 U/L / AST: 21 U/L / GGT: x           PT/INR - ( 06 Jul 2021 14:39 )   PT: 15.1 sec;   INR: 1.32 ratio         PTT - ( 06 Jul 2021 14:39 )  PTT:28.3 sec      Blood Culture: N/A    RADIOLOGY:  < from: CT Brain Stroke Protocol (07.06.21 @ 14:33) >  IMPRESSION: Atrophy and small vessel white matter ischemic changes. No change since 6/20/2021. No hemorrhage.  CT perfusion demonstrates 2 small areas at risk for infarction, in the temporal regions bilaterally.  CTA is limited by motion but demonstrates no large vessel occlusion.  < end of copied text >    ECHOCARDIOGRAM: pending    MEDICATIONS:  MEDICATIONS  (STANDING):  ALPRAZolam 0.25 milliGRAM(s) Oral once  aspirin  chewable 81 milliGRAM(s) Oral daily  atorvastatin 80 milliGRAM(s) Oral at bedtime  dextrose 40% Gel 15 Gram(s) Oral once  dextrose 5% + sodium chloride 0.9%. 1000 milliLiter(s) (83 mL/Hr) IV Continuous <Continuous>  dextrose 5%. 1000 milliLiter(s) (50 mL/Hr) IV Continuous <Continuous>  dextrose 5%. 1000 milliLiter(s) (100 mL/Hr) IV Continuous <Continuous>  dextrose 50% Injectable 25 Gram(s) IV Push once  dextrose 50% Injectable 12.5 Gram(s) IV Push once  dextrose 50% Injectable 25 Gram(s) IV Push once  gabapentin 300 milliGRAM(s) Oral three times a day  glucagon  Injectable 1 milliGRAM(s) IntraMuscular once  insulin lispro (ADMELOG) corrective regimen sliding scale   SubCutaneous at bedtime  insulin lispro (ADMELOG) corrective regimen sliding scale   SubCutaneous three times a day before meals  levothyroxine 112 MICROGram(s) Oral daily  losartan 50 milliGRAM(s) Oral daily  NIFEdipine XL 60 milliGRAM(s) Oral daily  pantoprazole    Tablet 40 milliGRAM(s) Oral before breakfast  rivaroxaban 20 milliGRAM(s) Oral daily    Home Medications:  aspirin 81 mg oral tablet: 1 tab(s) orally once a day (06 Jul 2021 17:43)  gabapentin 300 mg oral capsule: 1 cap(s) orally 3 times a day (06 Jul 2021 17:43)  levothyroxine 112 mcg (0.112 mg) oral tablet: 1 tab(s) orally once a day (06 Jul 2021 17:43)  losartan 50 mg oral tablet: 1 tab(s) orally once a day (06 Jul 2021 17:43)  NIFEdipine 30 mg oral tablet, extended release: 2 tab(s) orally once a day (06 Jul 2021 17:43)  NovoLOG FlexPen 100 units/mL injectable solution: Use as needed after chemo (06 Jul 2021 17:43)  pantoprazole 40 mg oral delayed release tablet: 1 tab(s) orally once a day (in the morning) (06 Jul 2021 17:43)  Pfizer-BioNTech COVID-19 Vaccine PF 30 mcg/0.3 mL intramuscular suspension: 0.3 milliliter(s) intramuscular once  ***2nd dose a few months ago*** (06 Jul 2021 17:43)  Tresiba 100 units/mL subcutaneous solution: 30 unit(s) subcutaneous once (at bedtime) (06 Jul 2021 17:43)  Xarelto 20 mg oral tablet: 1 tab(s) orally once a day (in the evening) (06 Jul 2021 17:43)    TELEMETRY REVIEW:  7/7: NSR 70s-80 with APCs  7/8: NSR 70s-90s with APCs    ASSESSMENT AND PLAN:    #Stroke vs TIA  #Metabolic encephalopathy- resolved  - monitor on tele. tele review as above.  - CT perfusion: 2 small areas at risk for infarction, in the temporal regions bilaterally. No LVO  - MRI brain with contrast pending  - PT eval: rec home w/ assist  - Speech: no CI to regular diet  - cont ASA 81daily  - cont atorvastatin daily  - lipid panel low HDL, a1c 6.6  - EEG - mild abnormal diffuse slowing  - neuro consulted    #Orthostatic Hypotension  - Echo 6/21/21 EF 55%, mod MR, mild, TR, mild pulm HTN  - Cont. with IVF NS @75  7/7: decrease nifedipine, compression socks b/l  - recheck orthostatics    #Hx of Ovarian CA  - leukopenia worsening, neutrophil 1.27  - Dx in April 2021  - As per pt, Last chemo 6/30 with carboplatin/taxol for total of 3 rounds  - heme consulted    #Depression  - palliative consult for depressive sxs and pain pending  - psych eval     #folate deficiency  - on folic acid daily    #HLD  - cont atovastatin  - lipid panel    #HTN  - reduced nifedipine to 30mg qd in setting of orthostasis  - Cont. Losartan     #DM Type 2  - hold any oral DM meds, pt on Tresiba 30u qhs at home  - lantus 5u bedtime  - on ISS  - a1c 6.6     #Respiratory Alkalosis, suspected r/t anxiety. respiratory status stable on RA    #DVT ppx  - pt with hx of DVT on Xarelto    Dispo: home w/ home PT CHIEF COMPLAINT/DIAGNOSIS: facial droop, slurred speech and Right sided weakness    HPI:  76/ F with PMHx of ovarian CA ([facility] patient, Dxs in April 2021, on Chemo no radiation), DVT on Xarelto, HTN, HLD and asthma , DM, chronic back pain secondary to herniated disc presents to the ED c/o facial droop, slurred speech and Right sided weakness. Per pt's son, pt was outside with son when pt c/o R sided weakness on lower and upper extremities in addition to R sided facial droop. Son brought mom to hospital immediately. Pt is on Xarelto since pt had similar symptoms and was evaluated for a TIA 2 weeks ago and saw Dr. Lopez who put her on an anticoagulant??. Work up included: CTH- negative for acute ischemia, EEG- neg for seizure, MR brain- No acute or subacute stroke. Pt took Xarelto this morning as the most recent dose. Currently states that there is a slight improvement of extremities. Pt was seen y neuro and deemed not to be tPA candidate as on xarelto.     7/7: Pt assessed at bedside. States that her difficulty speaking and weakness has improved. c/o of nausea. Denies chest pain, SOB.   7/8: Pt states that shes depressed. Reports that she was unable to have the MRI done because of feeling very claustrophobic and anxious and states that the xanax did not help. Pt also c/o constant epigastric abd pain and then intermittent sharp abd     REVIEW OF SYSTEMS:  All other review of systems is negative unless indicated above    PHYSICAL EXAM:  Constitutional: NAD, awake and alert  HEENT: PERR, EOMI, Normal Hearing, MMM  Neck: Soft and supple, No LAD, No JVD  Respiratory: Breath sounds are clear bilaterally, No wheezing, rales or rhonchi  Cardiovascular: S1 and S2, regular rate and rhythm, no Murmurs, gallops or rubs  Gastrointestinal: Bowel Sounds present, soft, nontender, nondistended, no guarding, no rebound  Extremities: No peripheral edema  Vascular: 2+ peripheral pulses  Neurological: A/O x 3, no gross focal deficits, pronator drift neg. fluent speech, no facial droop, EOMI. moves all extremities, finger to nose test intact  Musculoskeletal: 5/5 strength b/l upper and lower extremities  Skin: No rashes    Vital Signs Last 24 Hrs  T(C): 36.5 (08 Jul 2021 08:12), Max: 36.9 (07 Jul 2021 20:29)  T(F): 97.7 (08 Jul 2021 08:12), Max: 98.4 (07 Jul 2021 20:29)  HR: 72 (08 Jul 2021 08:12) (72 - 75)  BP: 113/51 (08 Jul 2021 08:12) (113/51 - 159/68)  BP(mean): --  RR: 18 (08 Jul 2021 08:12) (17 - 18)  SpO2: 100% (08 Jul 2021 08:12) (97% - 100%)    LABS: All Labs Reviewed:                        10.2   2.26  )-----------( 229      ( 08 Jul 2021 07:27 )             32.9     07-07    138  |  106  |  19  ----------------------------<  138<H>  3.9   |  26  |  0.86    Ca    8.7      07 Jul 2021 09:52  Mg     1.7     07-07    TPro  6.0  /  Alb  2.8<L>  /  TBili  0.7  /  DBili  x   /  AST  21  /  ALT  24  /  AlkPhos  83  07-07    CARDIAC MARKERS ( 06 Jul 2021 14:39 )  <0.015 ng/mL / x     / x     / x     / x        LIVER FUNCTIONS - ( 07 Jul 2021 09:52 )  Alb: 2.8 g/dL / Pro: 6.0 gm/dL / ALK PHOS: 83 U/L / ALT: 24 U/L / AST: 21 U/L / GGT: x           PT/INR - ( 06 Jul 2021 14:39 )   PT: 15.1 sec;   INR: 1.32 ratio         PTT - ( 06 Jul 2021 14:39 )  PTT:28.3 sec      Blood Culture: N/A    RADIOLOGY:  < from: CT Brain Stroke Protocol (07.06.21 @ 14:33) >  IMPRESSION: Atrophy and small vessel white matter ischemic changes. No change since 6/20/2021. No hemorrhage.  CT perfusion demonstrates 2 small areas at risk for infarction, in the temporal regions bilaterally.  CTA is limited by motion but demonstrates no large vessel occlusion.  < end of copied text >    ECHOCARDIOGRAM:   < from: TTE Echo Complete w/o Contrast w/ Doppler (06.21.21 @ 10:39) >   The left ventricle is normal in size, wall thickness, wall motion and   contractility.   Estimated left ventricular ejection fraction is 55 %.   The left atrium is mildly dilated.   Minimal Fibrocalcific changes noted to the Aortic valve leaflets with   preserved leaflet excursion.   Trace aortic regurgitation is present.   Fibrocalcific changes noted to the mitral valve leaflets with preserved   leaflet excursion.   Moderate (2+) mitral regurgitation is present.   Mildto Moderate Tricuspid regurgitation is present.   Mild pulmonary hypertension.  < end of copied text >    MEDICATIONS:  MEDICATIONS  (STANDING):  ALPRAZolam 0.25 milliGRAM(s) Oral once  aspirin  chewable 81 milliGRAM(s) Oral daily  atorvastatin 80 milliGRAM(s) Oral at bedtime  dextrose 40% Gel 15 Gram(s) Oral once  dextrose 5% + sodium chloride 0.9%. 1000 milliLiter(s) (83 mL/Hr) IV Continuous <Continuous>  dextrose 5%. 1000 milliLiter(s) (50 mL/Hr) IV Continuous <Continuous>  dextrose 5%. 1000 milliLiter(s) (100 mL/Hr) IV Continuous <Continuous>  dextrose 50% Injectable 25 Gram(s) IV Push once  dextrose 50% Injectable 12.5 Gram(s) IV Push once  dextrose 50% Injectable 25 Gram(s) IV Push once  gabapentin 300 milliGRAM(s) Oral three times a day  glucagon  Injectable 1 milliGRAM(s) IntraMuscular once  insulin lispro (ADMELOG) corrective regimen sliding scale   SubCutaneous at bedtime  insulin lispro (ADMELOG) corrective regimen sliding scale   SubCutaneous three times a day before meals  levothyroxine 112 MICROGram(s) Oral daily  losartan 50 milliGRAM(s) Oral daily  NIFEdipine XL 60 milliGRAM(s) Oral daily  pantoprazole    Tablet 40 milliGRAM(s) Oral before breakfast  rivaroxaban 20 milliGRAM(s) Oral daily    Home Medications:  aspirin 81 mg oral tablet: 1 tab(s) orally once a day (06 Jul 2021 17:43)  gabapentin 300 mg oral capsule: 1 cap(s) orally 3 times a day (06 Jul 2021 17:43)  levothyroxine 112 mcg (0.112 mg) oral tablet: 1 tab(s) orally once a day (06 Jul 2021 17:43)  losartan 50 mg oral tablet: 1 tab(s) orally once a day (06 Jul 2021 17:43)  NIFEdipine 30 mg oral tablet, extended release: 2 tab(s) orally once a day (06 Jul 2021 17:43)  NovoLOG FlexPen 100 units/mL injectable solution: Use as needed after chemo (06 Jul 2021 17:43)  pantoprazole 40 mg oral delayed release tablet: 1 tab(s) orally once a day (in the morning) (06 Jul 2021 17:43)  Pfizer-BioNTech COVID-19 Vaccine PF 30 mcg/0.3 mL intramuscular suspension: 0.3 milliliter(s) intramuscular once  ***2nd dose a few months ago*** (06 Jul 2021 17:43)  Tresiba 100 units/mL subcutaneous solution: 30 unit(s) subcutaneous once (at bedtime) (06 Jul 2021 17:43)  Xarelto 20 mg oral tablet: 1 tab(s) orally once a day (in the evening) (06 Jul 2021 17:43)    TELEMETRY REVIEW:  7/7: NSR 70s-80 with APCs  7/8: NSR 70s-90s with APCs    ASSESSMENT AND PLAN:    #Stroke vs TIA  #Metabolic encephalopathy- resolved  - monitor on tele. tele review as above.  - CT perfusion: 2 small areas at risk for infarction, in the temporal regions bilaterally. No LVO  - MRI brain with contrast pending  - PT eval: rec home w/ assist  - Speech: no CI to regular diet  - cont ASA 81daily  - cont atorvastatin daily  - lipid panel low HDL, a1c 6.6  - EEG - mild abnormal diffuse slowing  - neuro consulted    #Orthostatic Hypotension  - Echo 6/21/21 EF 55%, mod MR, mild, TR, mild pulm HTN  - s/p IVF  7/7: decrease nifedipine, compression socks b/l  7/8: recheck orthostatics pending     #Hx of Ovarian CA  - leukopenia worsening, neutrophil 1.27  - Dx in April 2021  - As per pt, Last chemo 6/30 with carboplatin/taxol for total of 3 rounds  - heme consulted    #Depression, Pain  - Started Oxycodone q4. senna, miralax  - palliative consult for depressive sxs and pain pending  - psych eval     #folate deficiency  - on folic acid daily    #HLD  - cont atovastatin  - lipid panel    #HTN  - reduced nifedipine to 30mg qd in setting of orthostasis  - Cont. Losartan     #DM Type 2  - hold any oral DM meds, pt on Tresiba 30u qhs at home  - Lantus 5u bedtime, ISS  - a1c 6.6     #Respiratory Alkalosis, suspected r/t anxiety. respiratory status stable on RA    #DVT ppx  - pt with hx of DVT on Xarelto    Dispo: home w/ home PT  ~ Spoke  w/ son, all questions/ concerns addressed.

## 2021-07-09 DIAGNOSIS — F43.21 ADJUSTMENT DISORDER WITH DEPRESSED MOOD: ICD-10-CM

## 2021-07-09 LAB
ALBUMIN SERPL ELPH-MCNC: 2.9 G/DL — LOW (ref 3.3–5)
ALP SERPL-CCNC: 82 U/L — SIGNIFICANT CHANGE UP (ref 40–120)
ALT FLD-CCNC: 32 U/L — SIGNIFICANT CHANGE UP (ref 12–78)
ANION GAP SERPL CALC-SCNC: 8 MMOL/L — SIGNIFICANT CHANGE UP (ref 5–17)
AST SERPL-CCNC: 30 U/L — SIGNIFICANT CHANGE UP (ref 15–37)
BILIRUB SERPL-MCNC: 0.3 MG/DL — SIGNIFICANT CHANGE UP (ref 0.2–1.2)
BUN SERPL-MCNC: 21 MG/DL — SIGNIFICANT CHANGE UP (ref 7–23)
CALCIUM SERPL-MCNC: 8.6 MG/DL — SIGNIFICANT CHANGE UP (ref 8.5–10.1)
CHLORIDE SERPL-SCNC: 110 MMOL/L — HIGH (ref 96–108)
CO2 SERPL-SCNC: 22 MMOL/L — SIGNIFICANT CHANGE UP (ref 22–31)
CREAT SERPL-MCNC: 0.75 MG/DL — SIGNIFICANT CHANGE UP (ref 0.5–1.3)
GLUCOSE BLDC GLUCOMTR-MCNC: 129 MG/DL — HIGH (ref 70–99)
GLUCOSE BLDC GLUCOMTR-MCNC: 169 MG/DL — HIGH (ref 70–99)
GLUCOSE BLDC GLUCOMTR-MCNC: 210 MG/DL — HIGH (ref 70–99)
GLUCOSE SERPL-MCNC: 168 MG/DL — HIGH (ref 70–99)
HCT VFR BLD CALC: 32.8 % — LOW (ref 34.5–45)
HGB BLD-MCNC: 10 G/DL — LOW (ref 11.5–15.5)
MCHC RBC-ENTMCNC: 25.1 PG — LOW (ref 27–34)
MCHC RBC-ENTMCNC: 30.5 GM/DL — LOW (ref 32–36)
MCV RBC AUTO: 82.2 FL — SIGNIFICANT CHANGE UP (ref 80–100)
PLATELET # BLD AUTO: 233 K/UL — SIGNIFICANT CHANGE UP (ref 150–400)
POTASSIUM SERPL-MCNC: 3.4 MMOL/L — LOW (ref 3.5–5.3)
POTASSIUM SERPL-SCNC: 3.4 MMOL/L — LOW (ref 3.5–5.3)
PROT SERPL-MCNC: 6.1 GM/DL — SIGNIFICANT CHANGE UP (ref 6–8.3)
RBC # BLD: 3.99 M/UL — SIGNIFICANT CHANGE UP (ref 3.8–5.2)
RBC # FLD: 18.4 % — HIGH (ref 10.3–14.5)
SODIUM SERPL-SCNC: 140 MMOL/L — SIGNIFICANT CHANGE UP (ref 135–145)
WBC # BLD: 2.22 K/UL — LOW (ref 3.8–10.5)
WBC # FLD AUTO: 2.22 K/UL — LOW (ref 3.8–10.5)

## 2021-07-09 PROCEDURE — 99498 ADVNCD CARE PLAN ADDL 30 MIN: CPT

## 2021-07-09 PROCEDURE — 99233 SBSQ HOSP IP/OBS HIGH 50: CPT

## 2021-07-09 PROCEDURE — 99497 ADVNCD CARE PLAN 30 MIN: CPT

## 2021-07-09 RX ORDER — ALPRAZOLAM 0.25 MG
0.5 TABLET ORAL EVERY 8 HOURS
Refills: 0 | Status: DISCONTINUED | OUTPATIENT
Start: 2021-07-09 | End: 2021-07-12

## 2021-07-09 RX ORDER — DULOXETINE HYDROCHLORIDE 30 MG/1
30 CAPSULE, DELAYED RELEASE ORAL DAILY
Refills: 0 | Status: DISCONTINUED | OUTPATIENT
Start: 2021-07-09 | End: 2021-07-12

## 2021-07-09 RX ORDER — POTASSIUM CHLORIDE 20 MEQ
40 PACKET (EA) ORAL ONCE
Refills: 0 | Status: COMPLETED | OUTPATIENT
Start: 2021-07-09 | End: 2021-07-09

## 2021-07-09 RX ORDER — FENTANYL CITRATE 50 UG/ML
1 INJECTION INTRAVENOUS
Refills: 0 | Status: DISCONTINUED | OUTPATIENT
Start: 2021-07-12 | End: 2021-07-09

## 2021-07-09 RX ORDER — FENTANYL CITRATE 50 UG/ML
1 INJECTION INTRAVENOUS
Refills: 0 | Status: DISCONTINUED | OUTPATIENT
Start: 2021-07-09 | End: 2021-07-12

## 2021-07-09 RX ORDER — FENTANYL CITRATE 50 UG/ML
1 INJECTION INTRAVENOUS
Refills: 0 | Status: DISCONTINUED | OUTPATIENT
Start: 2021-07-09 | End: 2021-07-09

## 2021-07-09 RX ADMIN — LOSARTAN POTASSIUM 50 MILLIGRAM(S): 100 TABLET, FILM COATED ORAL at 09:21

## 2021-07-09 RX ADMIN — GABAPENTIN 300 MILLIGRAM(S): 400 CAPSULE ORAL at 13:03

## 2021-07-09 RX ADMIN — Medication 2: at 08:08

## 2021-07-09 RX ADMIN — GABAPENTIN 300 MILLIGRAM(S): 400 CAPSULE ORAL at 05:51

## 2021-07-09 RX ADMIN — OXYCODONE AND ACETAMINOPHEN 1 TABLET(S): 5; 325 TABLET ORAL at 11:16

## 2021-07-09 RX ADMIN — OXYCODONE AND ACETAMINOPHEN 1 TABLET(S): 5; 325 TABLET ORAL at 21:32

## 2021-07-09 RX ADMIN — ATORVASTATIN CALCIUM 80 MILLIGRAM(S): 80 TABLET, FILM COATED ORAL at 21:49

## 2021-07-09 RX ADMIN — PANTOPRAZOLE SODIUM 40 MILLIGRAM(S): 20 TABLET, DELAYED RELEASE ORAL at 05:51

## 2021-07-09 RX ADMIN — FENTANYL CITRATE 1 PATCH: 50 INJECTION INTRAVENOUS at 21:10

## 2021-07-09 RX ADMIN — SENNA PLUS 2 TABLET(S): 8.6 TABLET ORAL at 21:49

## 2021-07-09 RX ADMIN — GABAPENTIN 300 MILLIGRAM(S): 400 CAPSULE ORAL at 21:49

## 2021-07-09 RX ADMIN — INSULIN GLARGINE 5 UNIT(S): 100 INJECTION, SOLUTION SUBCUTANEOUS at 21:49

## 2021-07-09 RX ADMIN — Medication 2: at 16:53

## 2021-07-09 RX ADMIN — FENTANYL CITRATE 1 PATCH: 50 INJECTION INTRAVENOUS at 19:00

## 2021-07-09 RX ADMIN — Medication 1 MILLIGRAM(S): at 09:21

## 2021-07-09 RX ADMIN — RIVAROXABAN 20 MILLIGRAM(S): KIT at 21:48

## 2021-07-09 RX ADMIN — OXYCODONE AND ACETAMINOPHEN 1 TABLET(S): 5; 325 TABLET ORAL at 17:03

## 2021-07-09 RX ADMIN — Medication 40 MILLIEQUIVALENT(S): at 16:53

## 2021-07-09 RX ADMIN — OXYCODONE AND ACETAMINOPHEN 1 TABLET(S): 5; 325 TABLET ORAL at 06:24

## 2021-07-09 RX ADMIN — Medication 112 MICROGRAM(S): at 05:51

## 2021-07-09 RX ADMIN — Medication 81 MILLIGRAM(S): at 09:21

## 2021-07-09 RX ADMIN — FENTANYL CITRATE 1 PATCH: 50 INJECTION INTRAVENOUS at 18:29

## 2021-07-09 RX ADMIN — POLYETHYLENE GLYCOL 3350 17 GRAM(S): 17 POWDER, FOR SOLUTION ORAL at 09:21

## 2021-07-09 RX ADMIN — DULOXETINE HYDROCHLORIDE 30 MILLIGRAM(S): 30 CAPSULE, DELAYED RELEASE ORAL at 18:28

## 2021-07-09 NOTE — PROGRESS NOTE ADULT - ASSESSMENT
HPI: Pt is a 76y old Female with hx of 76/ F with PMHx of ovarian CA ([facility] patient, Dxs in April 2021, on Chemo no radiation), DVT on Xarelto, HTN, HLD and asthma , DM, chronic back pain secondary to herniated disc presents to the ED c/o facial droop, slurred speech and Right sided weakness. Per pt's son, pt was outside with son when pt c/o R sided weakness on lower and upper extremities in addition to R sided facial droop. Pt is on Xarelto since pt had similar symptoms and was evaluated for a TIA 2 weeks ago and saw Dr. Lopez who put her on an anticoagulant. Pt currently on Xarelto. Symptoms have resolved and work up is in progress. Palliative Medicine Consult to establish GOC.  7/8/21 Seen and examined at bedside with daughter in law present. Pt was just medicated for back and generalized pain and is sleepy. Medical hx provided by DIL.     Assessment and Plan:    1) TIA vs STROKE  -Ct head no acute findings  -Pt is not a candidate for t-PA as she took her Xarelto and aspirin last night.   -MRI brain pending  -MRI 6/22 noted  -Resolution of symptoms  -Neuro eval noted    2) Pain  -chronic back pain R/T herniated discs  -Generalized bone pain  -Neuropathy  pain R/T DM  -Cont Percocet 5 mg PO Q4H PRN/initiated today  -Add Fentanyl transderm  12 mcg/hr    3) Depression/Anxiety  -Situational  -Loss of independence  -Cancer diagnosis  -Pain  -Psych eval pending  -Add Xanax 0.5 mg PO Q6H PRN  4) Ovarian Cancer  -Diagnosed April 21  -Currently receiving chemo with [facility]  -As per DIL responding to treatment  -Onc eval noted    5) Advanced Directives  -Pt with capacity  -Son Mayur named HCP  -GOC discussion with family and pt 7/9 3P

## 2021-07-09 NOTE — PROGRESS NOTE ADULT - ASSESSMENT
1.  Depression-     palliative care evaluation appreciated    psych evaluation - pending  2. Patient now status post 3 cycles of carboplatin and paclitaxel will reassess patient's candidacy and discharge;   3. CVA/TIA-neurology presently following CT did not demonstrate any acute findings; MRI of the brain will be postponed as the patient was severely claustrophobic and unable to tolerate MRI, ? Plan for MRI with anesthesia;  would recommend ongoing medical management continue with statins anti coagulation.  4. Leukopenia secondary to chemo induced myelosuppression.    Patient Education     Sinusitis, Antibiotic Treatment (Child)  The sinuses are air-filled spaces in the skull. They are behind the forehead, in the nasal bones and cheeks, and around the eyes. When sinuses are healthy, air moves freely and mucus drains. When a child has a cold or an allergy, the lining of the nose and sinuses can become swollen. Mucus can become trapped. Bacteria may then multiply, causing bacterial sinusitis. This is also called a sinus infection.  Sinusitis often starts with a cold. Cold symptoms usually go away in 5 or 10 days. If sinusitis develops, the symptoms continue and may even get worse. Thick, yellow-green mucus may drain from the nose. Your child may cough more. Your child may also have bad breath that doesn’t go away. Other symptoms may include pain or swelling in the face, sore throat, or headache.  The health care provider has prescribed antibiotics to treat the bacterial infection. Symptoms usually get better 2 to 3 days after your child starts the medicine.  Home care  Follow these guidelines when caring for your child at home:  · The health care provider has prescribed an oral antibiotic for your child. This is to help stop the infection. Follow all instructions for giving this medicine to your child. Make sure your child takes the medication every day until it is gone. You should not have any left over. You may also be told to use saline nasal drops or a decongestant.  · If your child has pain, give him or her pain medicine as advised by your child’s provider. Don't give your child aspirin unless told to do so. Don't give your child any other medicine without first asking the provider.  · Give your child plenty of time to rest. Try to make your child as comfortable as possible. Some children may be distracted by quiet activities.  · Encourage your child to drink liquids. Toddlers or older children may prefer cold drinks, frozen desserts, or popsicles. They may also like warm  chicken soup or beverages with lemon and honey. Don't give honey to children younger than 1 year old.  · Use a cool-mist humidifier in your child’s bedroom to make breathing easier, especially at night. Clean and dry the humidifier to keep bacteria and mold from growing. Don’t use using a hot water vaporizer. It can cause burns.  · Don’t smoke around your child. Tobacco smoke can make your child’s symptoms worse.  Follow-up care  Follow up with your child’s healthcare provider, or as directed.  When to seek medical advice  Unless advised otherwise, call your child's healthcare provider if:  · Your child is 3 months old or younger and has a fever of 100.4°F (38°C) or higher. Your child may need to see a healthcare provider.  · Your child is of any age and has fevers higher than 104°F (40°C) that come back again and again.  Call your child's provider right away if your child has any of these:  · Swelling or redness around eyes that lasts all day, not just in the morning  · Vomiting that continues  · Sensitivity to light  · Irritability that gets worse  · Sudden or severe pain in face or head  · Double vision  · Not acting right or not thinking clearly  · Stiff neck  · Breathing problems  · Symptoms not going away in 10 days  © 4685-7333 [employer]. [address] [place] [address], [place] [address]. All rights reserved. This information is not intended as a substitute for professional medical care. Always follow your healthcare professional's instructions.    Interested in decreasing your wait time in the Walk-in/Urgent Care Clinic?  Same day reservations can now be made on line.  Go to [email or web address] to see the wait times at each [facility] and to make a reservation at the site that is most convenient for you. We will do our best to honor your reservation time but please understand that wait times are subject to change once you arrive at the clinic.      Thank you for visiting the [facility], [place]     It is difficult to recognize all elements of any illness or injury in a single visit. The examination, treatment and x-rays received are on a preliminary basis only. A radiologist will also review your x-rays for final reading. Call your primary care provider if you have questions or problems before your next appointment. If you are unable to  reach your Primary Care Provider (PCP), please call or return to the Walk-In Clinic.  If symptoms worsen or do not resolve please follow-up with your Primary Care Provider (PCP), Walk-In or the nearest  Emergency Room for emergency symptoms.  If you are unsure of whom to follow up with, call [phone] and ask to speak with either your PCP, the Walk-In nurse or the on-call Provider if you are calling after hours.      If you are referred to a specialist or scheduled for a test, our Referrals department will call you with your appointment date and time within 3 business days. If you have not heard from them in this time frame, please call [phone] and ask for the Referrals department.     Test results: Unless otherwise instructed, you should be notified of test results within one week. Please call our office if you do not hear from us within this time frame at [phone].     Hours:  Monday through Friday: 7:00 am to 7:00 pm  Saturday: 8:00 am to 2:00 pm  Sunday: 8:00 am to 12:00 noon.  Holiday hours may vary, please call [phone] on Holidays.  Walk-In is closed on Memorial Day, Thanksgiving Day, Adrian Day and  New Year's Day.      Thank you again for visiting [facility] [place].  Samantha Trivedi NP    Help us to grow our quality of service!  We want to improve - and you can help!  You may receive a survey in the mail.  This is your opportunity to tell us what excellent service you received and where we could use improvement.  We value your input!

## 2021-07-09 NOTE — PROGRESS NOTE ADULT - SUBJECTIVE AND OBJECTIVE BOX
INTERVAL HPI/OVERNIGHT EVENTS:  Patient S&E at bedside. No o/n events, patient resting comfortably.   Pain much better controlled.     VITAL SIGNS:  T(F): 97.2 (07-09-21 @ 08:43)  HR: 61 (07-09-21 @ 08:43)  BP: 126/57 (07-09-21 @ 08:43)  RR: 18 (07-09-21 @ 08:43)  SpO2: 100% (07-09-21 @ 08:43)  Wt(kg): --    PHYSICAL EXAM:    Constitutional: NAD  Eyes: EOMI, sclera non-icteric  Neck: supple, no masses, no JVD  Respiratory: CTA b/l, good air entry b/l  Cardiovascular: RRR, no M/R/G  Gastrointestinal: soft, NTND, no masses palpable, + BS, no hepatosplenomegaly  Extremities: no c/c/e  Neurological: AAOx3      MEDICATIONS  (STANDING):  aspirin  chewable 81 milliGRAM(s) Oral daily  atorvastatin 80 milliGRAM(s) Oral at bedtime  dextrose 40% Gel 15 Gram(s) Oral once  dextrose 5%. 1000 milliLiter(s) (50 mL/Hr) IV Continuous <Continuous>  dextrose 5%. 1000 milliLiter(s) (100 mL/Hr) IV Continuous <Continuous>  dextrose 50% Injectable 25 Gram(s) IV Push once  dextrose 50% Injectable 12.5 Gram(s) IV Push once  dextrose 50% Injectable 25 Gram(s) IV Push once  DULoxetine 30 milliGRAM(s) Oral daily  fentaNYL   Patch  12 MICROgram(s)/Hr 1 Patch Transdermal every 72 hours  folic acid 1 milliGRAM(s) Oral daily  gabapentin 300 milliGRAM(s) Oral three times a day  glucagon  Injectable 1 milliGRAM(s) IntraMuscular once  insulin glargine Injectable (LANTUS) 5 Unit(s) SubCutaneous at bedtime  insulin lispro (ADMELOG) corrective regimen sliding scale   SubCutaneous at bedtime  insulin lispro (ADMELOG) corrective regimen sliding scale   SubCutaneous three times a day before meals  levothyroxine 112 MICROGram(s) Oral daily  losartan 50 milliGRAM(s) Oral daily  pantoprazole    Tablet 40 milliGRAM(s) Oral before breakfast  polyethylene glycol 3350 17 Gram(s) Oral daily  rivaroxaban 20 milliGRAM(s) Oral daily  senna 2 Tablet(s) Oral at bedtime    MEDICATIONS  (PRN):  ALPRAZolam 0.5 milliGRAM(s) Oral every 8 hours PRN Anxiety  ondansetron Injectable 4 milliGRAM(s) IV Push every 6 hours PRN Nausea  oxycodone    5 mG/acetaminophen 325 mG 1 Tablet(s) Oral every 4 hours PRN Moderate Pain (4 - 6)      Allergies    Betadine (Unknown)  tetracycline (Unknown)    Intolerances        LABS:                        10.0   2.22  )-----------( 233      ( 09 Jul 2021 08:01 )             32.8     07-09    140  |  110<H>  |  21  ----------------------------<  168<H>  3.4<L>   |  22  |  0.75    Ca    8.6      09 Jul 2021 08:01    TPro  6.1  /  Alb  2.9<L>  /  TBili  0.3  /  DBili  x   /  AST  30  /  ALT  32  /  AlkPhos  82  07-09          RADIOLOGY & ADDITIONAL TESTS:  Studies reviewed.    ASSESSMENT & PLAN:

## 2021-07-09 NOTE — GOALS OF CARE CONVERSATION - ADVANCED CARE PLANNING - DECISION MAKER
[facility]     Department of Emergency Medicine   ED  Provider Note  Admit Date/RoomTime: 8/17/2019 12:12 AM  ED Room: 25/25  Chief Complaint   Dysuria (had surgery today at [facility]. )    History of Present Illness   Source of history provided by:  patient. History/Exam Limitations: none. Dorene Elizabeth is a 76 y.o. old male who has a past medical history of:   Past Medical History:   Diagnosis Date    Ascending aortic aneurysm (HCC)     4cm, stable follows with Dr. Snehal Hatch CAD (coronary artery disease)     COPD (chronic obstructive [facility] disease) [facility] [address]     Epigastric pain     Hyperlipidemia     Hypertension     not on any medications    Kidney stones     MI (myocardial infarction) ([facility] [address] 2005    On aspirin at home     history cad with stents    Pre-diabetes 7/3/2018    presents to the emergency department by private vehicle, for urinary retention, which occured several hour(s) prior to arrival.  Since onset the symptoms have been persistent and moderate to severe in severity. Symptoms are associated with the recent receipt of anesthesia for minor surgical procedure today at [facility].  He has a history of enlarged prostate and chronic straining to urinate that has never been fully evaluated. Took sons Flomax x 1 at home without relief. Denies any associated fever or chills. Denies chest pains, shortness of breath, cough with phlegm or hemoptysis. Denies abdominal pains, nausea, vomiting, change in bowel patterns, hematochezia or melena. Admits to suprapubic fullness and pain, denies recent dysuria, hematuria, or flank pains. ROS    Pertinent positives and negatives are stated within HPI, all other systems reviewed and are negative.     Past Surgical History:   Procedure Laterality Date   [address]    angioplasty   Kaley Fare CARDIAC SURGERY  2005    stents    CHOLECYSTECTOMY  6/2012    COLONOSCOPY      ENDOSCOPY, COLON, DIAGNOSTIC  06/13/2017    biopsy    ERCP (All laboratory and radiology results have been personally reviewed by myself)  Labs:  Results for orders placed or performed during the hospital encounter of 08/17/19   Urinalysis   Result Value Ref Range    Color, UA Yellow Straw/Yellow    Clarity, UA Clear Clear    Glucose, Ur 250 (A) Negative mg/dL    Bilirubin Urine Negative Negative    Ketones, Urine Negative Negative mg/dL    Specific Gravity, UA 1.020 1.005 - 1.030    Blood, Urine Negative Negative    pH, UA 5.0 5.0 - 9.0    Protein, UA Negative Negative mg/dL    Urobilinogen, Urine 0.2 <2.0 E.U./dL    Nitrite, Urine Negative Negative    Leukocyte Esterase, Urine Negative Negative       Imaging: All Radiology results interpreted by Radiologist unless otherwise noted. No orders to display     ED Course / Medical Decision Making     Medications   bacitracin 500 UNIT/GM ointment (has no administration in time range)   tamsulosin (FLOMAX) capsule 0.4 mg (0.4 mg Oral Given 8/17/19 0215)        Re-examination:  8/17/19       Time: 2:30 am   Patients symptoms have resolved with catheter placement. Left thigh  Wound is inspected showing well appearing, and well approximated stapled incision with no active bleeding or discharge and no findings to suggest interval development of infection. Dressing applied per 's recommendations. Consults:   IP CONSULT TO PLASTIC SURGERY    Procedures:   none    Medical Decision Making:    Patient is well appearing, non toxic and appropriate for outpatient management. Plan is for symptom management and PCP follow up. The patient's urine shows no signs of infection, Edwards is left in place with plan for close outpatient follow up and outpatient removal by urology. Flomax started. The wound appears well, is well approximated with staples in place. There is no active bleeding. Dressing applied per the surgeons recommendations and patient advised to call surgeon in am for further instruction and follow up.  ER if Patient

## 2021-07-09 NOTE — BEHAVIORAL HEALTH ASSESSMENT NOTE - RISK ASSESSMENT
Low Acute Suicide Risk LOW RISK     ACUTE RISK FACTORS: depressed mood, acute medical comorbidities    CHRONIC RISK FACTORS: medical comorbidities     PROTECTIVE FACTORS: future oriented, no suicidal ideation/intent/plan

## 2021-07-09 NOTE — PROGRESS NOTE ADULT - SUBJECTIVE AND OBJECTIVE BOX
CHIEF COMPLAINT/DIAGNOSIS: facial droop, slurred speech and Right sided weakness    HPI:  76/ F with PMHx of ovarian CA ([facility] patient, Dxs in April 2021, on Chemo no radiation), DVT on Xarelto, HTN, HLD and asthma , DM, chronic back pain secondary to herniated disc presents to the ED c/o facial droop, slurred speech and Right sided weakness. Per pt's son, pt was outside with son when pt c/o R sided weakness on lower and upper extremities in addition to R sided facial droop. Son brought mom to hospital immediately. Pt is on Xarelto since pt had similar symptoms and was evaluated for a TIA 2 weeks ago and saw Dr. Lopez who put her on an anticoagulant??. Work up included: CTH- negative for acute ischemia, EEG- neg for seizure, MR brain- No acute or subacute stroke. Pt took Xarelto this morning as the most recent dose. Currently states that there is a slight improvement of extremities. Pt was seen y neuro and deemed not to be tPA candidate as on xarelto.     7/7: Pt assessed at bedside. States that her difficulty speaking and weakness has improved. c/o of nausea. Denies chest pain, SOB.   7/8: Pt states that shes depressed. Reports that she was unable to have the MRI done because of feeling very claustrophobic and anxious and states that the xanax did not help. Pt also c/o constant epigastric abd pain and then intermittent sharp abd   7/9:     REVIEW OF SYSTEMS:  All other review of systems is negative unless indicated above    PHYSICAL EXAM:  Constitutional: NAD, awake and alert  HEENT: PERR, EOMI, Normal Hearing, MMM  Neck: Soft and supple, No LAD, No JVD  Respiratory: Breath sounds are clear bilaterally, No wheezing, rales or rhonchi  Cardiovascular: S1 and S2, regular rate and rhythm, no Murmurs, gallops or rubs  Gastrointestinal: Bowel Sounds present, soft, nontender, nondistended, no guarding, no rebound  Extremities: No peripheral edema  Vascular: 2+ peripheral pulses  Neurological: A/O x 3, no gross focal deficits, pronator drift neg. fluent speech, no facial droop, EOMI. moves all extremities, finger to nose test intact  Musculoskeletal: 5/5 strength b/l upper and lower extremities  Skin: No rashes    Vital Signs Last 24 Hrs  T(C): 36.5 (08 Jul 2021 08:12), Max: 36.9 (07 Jul 2021 20:29)  T(F): 97.7 (08 Jul 2021 08:12), Max: 98.4 (07 Jul 2021 20:29)  HR: 72 (08 Jul 2021 08:12) (72 - 75)  BP: 113/51 (08 Jul 2021 08:12) (113/51 - 159/68)  BP(mean): --  RR: 18 (08 Jul 2021 08:12) (17 - 18)  SpO2: 100% (08 Jul 2021 08:12) (97% - 100%)    LABS: All Labs Reviewed:                        10.2   2.26  )-----------( 229      ( 08 Jul 2021 07:27 )             32.9     07-07    138  |  106  |  19  ----------------------------<  138<H>  3.9   |  26  |  0.86    Ca    8.7      07 Jul 2021 09:52  Mg     1.7     07-07    TPro  6.0  /  Alb  2.8<L>  /  TBili  0.7  /  DBili  x   /  AST  21  /  ALT  24  /  AlkPhos  83  07-07    CARDIAC MARKERS ( 06 Jul 2021 14:39 )  <0.015 ng/mL / x     / x     / x     / x        LIVER FUNCTIONS - ( 07 Jul 2021 09:52 )  Alb: 2.8 g/dL / Pro: 6.0 gm/dL / ALK PHOS: 83 U/L / ALT: 24 U/L / AST: 21 U/L / GGT: x           PT/INR - ( 06 Jul 2021 14:39 )   PT: 15.1 sec;   INR: 1.32 ratio         PTT - ( 06 Jul 2021 14:39 )  PTT:28.3 sec      Blood Culture: N/A    RADIOLOGY:  < from: CT Brain Stroke Protocol (07.06.21 @ 14:33) >  IMPRESSION: Atrophy and small vessel white matter ischemic changes. No change since 6/20/2021. No hemorrhage.  CT perfusion demonstrates 2 small areas at risk for infarction, in the temporal regions bilaterally.  CTA is limited by motion but demonstrates no large vessel occlusion.  < end of copied text >    ECHOCARDIOGRAM:   < from: TTE Echo Complete w/o Contrast w/ Doppler (06.21.21 @ 10:39) >   The left ventricle is normal in size, wall thickness, wall motion and   contractility.   Estimated left ventricular ejection fraction is 55 %.   The left atrium is mildly dilated.   Minimal Fibrocalcific changes noted to the Aortic valve leaflets with   preserved leaflet excursion.   Trace aortic regurgitation is present.   Fibrocalcific changes noted to the mitral valve leaflets with preserved   leaflet excursion.   Moderate (2+) mitral regurgitation is present.   Mildto Moderate Tricuspid regurgitation is present.   Mild pulmonary hypertension.  < end of copied text >    MEDICATIONS:  MEDICATIONS  (STANDING):  ALPRAZolam 0.25 milliGRAM(s) Oral once  aspirin  chewable 81 milliGRAM(s) Oral daily  atorvastatin 80 milliGRAM(s) Oral at bedtime  dextrose 40% Gel 15 Gram(s) Oral once  dextrose 5% + sodium chloride 0.9%. 1000 milliLiter(s) (83 mL/Hr) IV Continuous <Continuous>  dextrose 5%. 1000 milliLiter(s) (50 mL/Hr) IV Continuous <Continuous>  dextrose 5%. 1000 milliLiter(s) (100 mL/Hr) IV Continuous <Continuous>  dextrose 50% Injectable 25 Gram(s) IV Push once  dextrose 50% Injectable 12.5 Gram(s) IV Push once  dextrose 50% Injectable 25 Gram(s) IV Push once  gabapentin 300 milliGRAM(s) Oral three times a day  glucagon  Injectable 1 milliGRAM(s) IntraMuscular once  insulin lispro (ADMELOG) corrective regimen sliding scale   SubCutaneous at bedtime  insulin lispro (ADMELOG) corrective regimen sliding scale   SubCutaneous three times a day before meals  levothyroxine 112 MICROGram(s) Oral daily  losartan 50 milliGRAM(s) Oral daily  NIFEdipine XL 60 milliGRAM(s) Oral daily  pantoprazole    Tablet 40 milliGRAM(s) Oral before breakfast  rivaroxaban 20 milliGRAM(s) Oral daily    Home Medications:  aspirin 81 mg oral tablet: 1 tab(s) orally once a day (06 Jul 2021 17:43)  gabapentin 300 mg oral capsule: 1 cap(s) orally 3 times a day (06 Jul 2021 17:43)  levothyroxine 112 mcg (0.112 mg) oral tablet: 1 tab(s) orally once a day (06 Jul 2021 17:43)  losartan 50 mg oral tablet: 1 tab(s) orally once a day (06 Jul 2021 17:43)  NIFEdipine 30 mg oral tablet, extended release: 2 tab(s) orally once a day (06 Jul 2021 17:43)  NovoLOG FlexPen 100 units/mL injectable solution: Use as needed after chemo (06 Jul 2021 17:43)  pantoprazole 40 mg oral delayed release tablet: 1 tab(s) orally once a day (in the morning) (06 Jul 2021 17:43)  Pfizer-BioNTech COVID-19 Vaccine PF 30 mcg/0.3 mL intramuscular suspension: 0.3 milliliter(s) intramuscular once  ***2nd dose a few months ago*** (06 Jul 2021 17:43)  Tresiba 100 units/mL subcutaneous solution: 30 unit(s) subcutaneous once (at bedtime) (06 Jul 2021 17:43)  Xarelto 20 mg oral tablet: 1 tab(s) orally once a day (in the evening) (06 Jul 2021 17:43)    TELEMETRY REVIEW:  7/7: NSR 70s-80 with APCs  7/8: NSR 70s-90s with APCs    ASSESSMENT AND PLAN:    #Stroke vs TIA  #Metabolic encephalopathy- resolved  - monitor on tele. tele review as above.  - CT perfusion: 2 small areas at risk for infarction, in the temporal regions bilaterally. No LVO  - MRI brain with contrast pending  - PT eval: rec home w/ assist  - Speech: no CI to regular diet  - cont ASA 81daily  - cont atorvastatin daily  - lipid panel low HDL, a1c 6.6  - EEG - mild abnormal diffuse slowing  - neuro consulted    #Orthostatic Hypotension  - Echo 6/21/21 EF 55%, mod MR, mild, TR, mild pulm HTN  - s/p IVF  7/7: decrease nifedipine, compression socks b/l  7/8: recheck orthostatics pending     #Hx of Ovarian CA  - leukopenia worsening, neutrophil 1.27  - Dx in April 2021  - As per pt, Last chemo 6/30 with carboplatin/taxol for total of 3 rounds  - heme consulted    #Depression, Pain  - Started Oxycodone q4. senna, miralax  - palliative consult for depressive sxs and pain pending  - psych eval     #folate deficiency  - on folic acid daily    #HLD  - cont atovastatin  - lipid panel    #HTN  - reduced nifedipine to 30mg qd in setting of orthostasis  - Cont. Losartan     #DM Type 2  - hold any oral DM meds, pt on Tresiba 30u qhs at home  - Lantus 5u bedtime, ISS  - a1c 6.6     #Respiratory Alkalosis, suspected r/t anxiety. respiratory status stable on RA    #DVT ppx  - pt with hx of DVT on Xarelto    Dispo: home w/ home PT  ~ Spoke  w/ son, all questions/ concerns addressed. CHIEF COMPLAINT/DIAGNOSIS: facial droop, slurred speech and Right sided weakness    HPI:  76/ F with PMHx of ovarian CA ([facility] patient, Dxs in April 2021, on Chemo no radiation), DVT on Xarelto, HTN, HLD and asthma , DM, chronic back pain secondary to herniated disc presents to the ED c/o facial droop, slurred speech and Right sided weakness. Per pt's son, pt was outside with son when pt c/o R sided weakness on lower and upper extremities in addition to R sided facial droop. Son brought mom to hospital immediately. Pt is on Xarelto since pt had similar symptoms and was evaluated for a TIA 2 weeks ago and saw Dr. Lopez who put her on an anticoagulant??. Work up included: CTH- negative for acute ischemia, EEG- neg for seizure, MR brain- No acute or subacute stroke. Pt took Xarelto this morning as the most recent dose. Currently states that there is a slight improvement of extremities. Pt was seen y neuro and deemed not to be tPA candidate as on xarelto.     7/7: Pt assessed at bedside. States that her difficulty speaking and weakness has improved. c/o of nausea. Denies chest pain, SOB.   7/8: Pt states that shes depressed. Reports that she was unable to have the MRI done because of feeling very claustrophobic and anxious and states that the xanax did not help. Pt also c/o constant epigastric abd pain and then intermittent sharp abd   7/9: very tearful and stressed about current clinical condition. feels unsupported w family. pain still present w/ some relief. did require mult. PRNs overnight. d/w pall team, will start fentanyl patch.     REVIEW OF SYSTEMS:  All other review of systems is negative unless indicated above    PHYSICAL EXAM:  Constitutional: NAD, awake and alert  HEENT:  Normal Hearing, MMM  Neck: Soft and supple   Respiratory: Breath sounds are clear bilaterally   Cardiovascular: S1 and S2, RRR  Gastrointestinal: Bowel Sounds present, soft, nontender, nondistended, no guarding, no rebound  Extremities: No peripheral edema  Vascular: 2+ peripheral pulses  Neurological: A/O x 3, no gross focal deficits, pronator drift neg. fluent speech, no facial droop, EOMI. moves all extremities, finger to nose test intact  Musculoskeletal: 5/5 strength b/l upper and lower extremities  Skin: No rashes    Vital Signs Last 24 Hrs  T(C): 36.2 (09 Jul 2021 08:43), Max: 36.4 (08 Jul 2021 20:24)  T(F): 97.2 (09 Jul 2021 08:43), Max: 97.6 (08 Jul 2021 20:24)  HR: 61 (09 Jul 2021 08:43) (61 - 65)  BP: 126/57 (09 Jul 2021 08:43) (126/57 - 133/42)  BP(mean): --  RR: 18 (09 Jul 2021 08:43) (18 - 18)  SpO2: 100% (09 Jul 2021 08:43) (95% - 100%) -- room air    LABS: All Labs Reviewed:                        10.0   2.22  )-----------( 233      ( 09 Jul 2021 08:01 )             32.8     07-09    140  |  110<H>  |  21  ----------------------------<  168<H>  3.4<L>   |  22  |  0.75    Ca    8.6      09 Jul 2021 08:01    TPro  6.1  /  Alb  2.9<L>  /  TBili  0.3  /  DBili  x   /  AST  30  /  ALT  32  /  AlkPhos  82  07-09    RADIOLOGY:  < from: CT Brain Stroke Protocol (07.06.21 @ 14:33) >  IMPRESSION: Atrophy and small vessel white matter ischemic changes. No change since 6/20/2021. No hemorrhage.  CT perfusion demonstrates 2 small areas at risk for infarction, in the temporal regions bilaterally.  CTA is limited by motion but demonstrates no large vessel occlusion.  < end of copied text >    ECHOCARDIOGRAM:   < from: TTE Echo Complete w/o Contrast w/ Doppler (06.21.21 @ 10:39) >   The left ventricle is normal in size, wall thickness, wall motion and   contractility.   Estimated left ventricular ejection fraction is 55 %.   The left atrium is mildly dilated.   Minimal Fibrocalcific changes noted to the Aortic valve leaflets with   preserved leaflet excursion.   Trace aortic regurgitation is present.   Fibrocalcific changes noted to the mitral valve leaflets with preserved   leaflet excursion.   Moderate (2+) mitral regurgitation is present.   Mild to Moderate Tricuspid regurgitation is present.   Mild pulmonary hypertension.  < end of copied text >    MEDICATIONS  (STANDING):  aspirin  chewable 81 milliGRAM(s) Oral daily  atorvastatin 80 milliGRAM(s) Oral at bedtime  dextrose 40% Gel 15 Gram(s) Oral once  dextrose 5%. 1000 milliLiter(s) (100 mL/Hr) IV Continuous <Continuous>  dextrose 5%. 1000 milliLiter(s) (50 mL/Hr) IV Continuous <Continuous>  dextrose 50% Injectable 25 Gram(s) IV Push once  dextrose 50% Injectable 12.5 Gram(s) IV Push once  dextrose 50% Injectable 25 Gram(s) IV Push once  DULoxetine 30 milliGRAM(s) Oral daily  fentaNYL   Patch  12 MICROgram(s)/Hr 1 Patch Transdermal every 72 hours  folic acid 1 milliGRAM(s) Oral daily  gabapentin 300 milliGRAM(s) Oral three times a day  glucagon  Injectable 1 milliGRAM(s) IntraMuscular once  insulin glargine Injectable (LANTUS) 5 Unit(s) SubCutaneous at bedtime  insulin lispro (ADMELOG) corrective regimen sliding scale   SubCutaneous three times a day before meals  insulin lispro (ADMELOG) corrective regimen sliding scale   SubCutaneous at bedtime  levothyroxine 112 MICROGram(s) Oral daily  losartan 50 milliGRAM(s) Oral daily  pantoprazole    Tablet 40 milliGRAM(s) Oral before breakfast  polyethylene glycol 3350 17 Gram(s) Oral daily  rivaroxaban 20 milliGRAM(s) Oral daily  senna 2 Tablet(s) Oral at bedtime    MEDICATIONS  (PRN):  ALPRAZolam 0.5 milliGRAM(s) Oral every 8 hours PRN Anxiety  ondansetron Injectable 4 milliGRAM(s) IV Push every 6 hours PRN Nausea  oxycodone    5 mG/acetaminophen 325 mG 1 Tablet(s) Oral every 4 hours PRN Moderate Pain (4 - 6)    Home Medications:  aspirin 81 mg oral tablet: 1 tab(s) orally once a day (06 Jul 2021 17:43)  gabapentin 300 mg oral capsule: 1 cap(s) orally 3 times a day (06 Jul 2021 17:43)  levothyroxine 112 mcg (0.112 mg) oral tablet: 1 tab(s) orally once a day (06 Jul 2021 17:43)  losartan 50 mg oral tablet: 1 tab(s) orally once a day (06 Jul 2021 17:43)  NIFEdipine 30 mg oral tablet, extended release: 2 tab(s) orally once a day (06 Jul 2021 17:43)  NovoLOG FlexPen 100 units/mL injectable solution: Use as needed after chemo (06 Jul 2021 17:43)  pantoprazole 40 mg oral delayed release tablet: 1 tab(s) orally once a day (in the morning) (06 Jul 2021 17:43)  Pfizer-BioNTech COVID-19 Vaccine PF 30 mcg/0.3 mL intramuscular suspension: 0.3 milliliter(s) intramuscular once  ***2nd dose a few months ago*** (06 Jul 2021 17:43)  Tresiba 100 units/mL subcutaneous solution: 30 unit(s) subcutaneous once (at bedtime) (06 Jul 2021 17:43)  Xarelto 20 mg oral tablet: 1 tab(s) orally once a day (in the evening) (06 Jul 2021 17:43)    TELEMETRY REVIEW:  7/7, 7/8: NSR 70-80s. -- D/C TELE    ASSESSMENT AND PLAN:    #Stroke vs TIA  #Metabolic encephalopathy - Resolved  - monitor on tele. tele review as above.  - CT perfusion: 2 small areas at risk for infarction, in the temporal regions bilaterally. No LVO  - MRI brain with contrast pending, patient unable to tolerate despite IV meds/distraction techniques. Can not entirely rule out CVA.   - Will cont. ASA/Statin/Xarelto  - EEG - mild abnormal diffuse slowing  - Echo reviewed. EF 55%, no severe valvular abnormalities   - Neuro, PT, Speech consulted    #Persistent Orthostatic Hypotension  - Echo 6/21/21 EF 55%, mod MR, mild, TR, mild pulm HTN  - s/p IVF  - STOPPED nifedipine (7/9)  - Cont. compression socks b/l    #Ovarian CA  #Leukopenia   - Dx in April 2021  - As per pt, Last chemo 6/30 with carboplatin/taxol for total of 3 rounds  - Patient experiencing mult. s/e r/t chemo  - Heme/Onc consulted    #Depression, Anixety  #Acute on Chronic Pain, herniated discs/abd pain  - Cont. Oxycodone q4 PRN  - Fentanyl 12mcg daily (started 7/9)  - Senna, miralax for Bm regimen  - Cymbalta started for pain/depression - patient hesitate about starting per psych.   - Palliative, Psych f/u appreciated     #HLD | HTN  - Cont. Statin, Losartan  - Nifedipine held due to orthostasis     #DM Type 2  - hold any oral DM meds, pt on Tresiba 30u qhs at home  - Cont. Lantus 5U HS, ISS  - a1c 6.6     #Malnutrition  #Folic Acid Deficiency   - Glucerna daily  - nutrition eval pending.     #HX DVT, DVT ppx  - Cont. Xarelto    Dispo: home w/ home PT  ~ Spoke w/ son, all questions/ concerns addressed. 7/8.  Plan for family meeting today with palliative on 7/9 at 3pm

## 2021-07-09 NOTE — PROGRESS NOTE ADULT - SUBJECTIVE AND OBJECTIVE BOX
HPI: Pt is a 76y old Female with hx of 76/ F with PMHx of ovarian CA ([facility] patient, Dxs in April 2021, on Chemo no radiation), DVT on Xarelto, HTN, HLD and asthma , DM, chronic back pain secondary to herniated disc presents to the ED c/o facial droop, slurred speech and Right sided weakness. Per pt's son, pt was outside with son when pt c/o R sided weakness on lower and upper extremities in addition to R sided facial droop. Pt is on Xarelto since pt had similar symptoms and was evaluated for a TIA 2 weeks ago and saw Dr. Lopez who put her on an anticoagulant. Pt currently on Xarelto. Symptoms have resolved and work up is in progress. Palliative Medicine Consult to establish GOC.  7/8/21 Seen and examined at bedside with daughter in law present. Pt was just medicated for back and generalized pain and is sleepy. Medical hx provided by DIL.   7/9/21 Seen and examined at bedside with HUGO Alan. Pt C/O back pain and endorses some relief after Percocet given . She is tearful and expressing frustration with her current medical/emotional situation.  PAIN: (X )Yes   ( )No  Level: mod-severe  Location: back/bone  Intensity:   8/10  Quality: ache  Aggravating Factors: ?  Alleviating Factors: Motrin with some relief  Impact on ADLs: mod    DYSPNEA: ( ) Yes  (X ) No  Level:    PAST MEDICAL & SURGICAL HISTORY:  Ovarian cancer  Asthma  HLD (hyperlipidemia)  DVT (deep venous thrombosis)  HTN (hypertension)    No significant past surgical history        SOCIAL HX:  Was living independently in [place]  Living with sons  Hx opiate tolerance ( )YES  (X )NO    Baseline ADLs  (Prior to Admission)  (X ) Independent   ( )Dependent    FAMILY HISTORY:  Family history unobtainable due to patient condition/lethargy        Review of Systems:    Anxiety-yes  Depression-yes  Physical Discomfort-mod  Dyspnea-denies  Constipation-yes  Anorexia-mild  Fatigue-mod  Weakness-mod  Delirium-no    All other systems reviewed and negative        PHYSICAL EXAM:  ICU Vital Signs Last 24 Hrs  T(C): 36.2 (09 Jul 2021 08:43), Max: 36.4 (08 Jul 2021 20:24)  T(F): 97.2 (09 Jul 2021 08:43), Max: 97.6 (08 Jul 2021 20:24)  HR: 61 (09 Jul 2021 08:43) (61 - 65)  BP: 126/57 (09 Jul 2021 08:43) (126/57 - 133/42)  RR: 18 (09 Jul 2021 08:43) (18 - 18)  SpO2: 100% (09 Jul 2021 08:43) (95% - 100%)      PPSV2: 40-50 %    General: Elderly female in bed in mild distress  Mental Status: A&O X3  HEENT: oral mucosa dry  Lungs: clear to auscultation jazzmine  Cardiac: S1S2+  GI: abd soft NT ND + BS  : voids  Ext: MERIDA = strength  Neuro: no focal def      LABS:                           10.0   2.22  )-----------( 233      ( 09 Jul 2021 08:01 )             32.8        07-09    140  |  110<H>  |  21  ----------------------------<  168<H>  3.4<L>   |  22  |  0.75    Ca    8.6      09 Jul 2021 08:01    TPro  6.1  /  Alb  2.9<L>  /  TBili  0.3  /  DBili  x   /  AST  30  /  ALT  32  /  AlkPhos  82  07-09    PT/INR - ( 06 Jul 2021 14:39 )   PT: 15.1 sec;   INR: 1.32 ratio         PTT - ( 06 Jul 2021 14:39 )  PTT:28.3 sec  Albumin: Albumin, Serum: 3.0 g/dL (07-08 @ 07:27)      Allergies    Betadine (Unknown)  tetracycline (Unknown)    Intolerances      MEDICATIONS  (STANDING):    MEDICATIONS  (PRN):    RADIOLOGY/ADDITIONAL STUDIES:

## 2021-07-09 NOTE — GOALS OF CARE CONVERSATION - ADVANCED CARE PLANNING - CONVERSATION DETAILS
The role of Palliative Medicne was reviewed. The pt discussed her difficulties dealing with a new diagnosis of advanced ovarian cancer and significant decline since April. She had to leave her home and relocate with her sons to seek treatment and has had a lot of loss. She has had significant pain in her back and abdomen as well as anxiety and depression. She sometimes considers stopping treatment although acknowledges that she has responded well and does not want to die. We reviewed her current symptom management and she has had relief with Percocet. We will introduce Fentanyl patch and Cymbalta for depression. She will follow up with [facility] for [place]. ALYSHA reviewed and she will consider completion on Monday 7/12. Will follow

## 2021-07-09 NOTE — BEHAVIORAL HEALTH ASSESSMENT NOTE - NSBHCHARTREVIEWLAB_PSY_A_CORE FT
07-09    140  |  110<H>  |  21  ----------------------------<  168<H>  3.4<L>   |  22  |  0.75    Ca    8.6      09 Jul 2021 08:01    TPro  6.1  /  Alb  2.9<L>  /  TBili  0.3  /  DBili  x   /  AST  30  /  ALT  32  /  AlkPhos  82  07-09

## 2021-07-09 NOTE — BEHAVIORAL HEALTH ASSESSMENT NOTE - NSBHCHARTREVIEWVS_PSY_A_CORE FT
Vital Signs Last 24 Hrs  T(C): 36.2 (09 Jul 2021 08:43), Max: 36.4 (08 Jul 2021 20:24)  T(F): 97.2 (09 Jul 2021 08:43), Max: 97.6 (08 Jul 2021 20:24)  HR: 61 (09 Jul 2021 08:43) (61 - 65)  BP: 126/57 (09 Jul 2021 08:43) (126/57 - 133/42)  BP(mean): --  RR: 18 (09 Jul 2021 08:43) (18 - 18)  SpO2: 100% (09 Jul 2021 08:43) (95% - 100%)

## 2021-07-09 NOTE — BEHAVIORAL HEALTH ASSESSMENT NOTE - HPI (INCLUDE ILLNESS QUALITY, SEVERITY, DURATION, TIMING, CONTEXT, MODIFYING FACTORS, ASSOCIATED SIGNS AND SYMPTOMS)
76 year-old  male, with history of grief related depression, no prior suicidal ideation or suicide attempt, no in-patient hospitalization. Patient consulted for depression in the setting of Ovarian cancer diagnosis.    Patient is alert and oriented to person, time, place and situation. Patient is tearful, stating after she was diagnosed with ovarian cancer (stage 4), 4.2021, she started getting depressed. Reports her primary stated that her response has been appropriate because "who would not be depressed." Reports "I am not ready to go," fearing dying; stating "I lived a good life; I do not want to go." Denies hopelessness, helplessness, worthlessness, anhedonia, guilt feelings, difficulty concentrating, fatigue, decreased appetite, low motivation and difficulty falling asleep in the setting of depressed mood. Denies manic / psychotic symptoms. No delusions elicited. Reports being ambivalent about starting antidepressant; however stating to discuss it with her family.

## 2021-07-10 LAB — GLUCOSE BLDC GLUCOMTR-MCNC: 157 MG/DL — HIGH (ref 70–99)

## 2021-07-10 PROCEDURE — 99232 SBSQ HOSP IP/OBS MODERATE 35: CPT

## 2021-07-10 RX ADMIN — LOSARTAN POTASSIUM 50 MILLIGRAM(S): 100 TABLET, FILM COATED ORAL at 09:25

## 2021-07-10 RX ADMIN — FENTANYL CITRATE 1 PATCH: 50 INJECTION INTRAVENOUS at 22:02

## 2021-07-10 RX ADMIN — GABAPENTIN 300 MILLIGRAM(S): 400 CAPSULE ORAL at 21:21

## 2021-07-10 RX ADMIN — OXYCODONE AND ACETAMINOPHEN 1 TABLET(S): 5; 325 TABLET ORAL at 21:21

## 2021-07-10 RX ADMIN — Medication 2: at 08:23

## 2021-07-10 RX ADMIN — ATORVASTATIN CALCIUM 80 MILLIGRAM(S): 80 TABLET, FILM COATED ORAL at 21:21

## 2021-07-10 RX ADMIN — DULOXETINE HYDROCHLORIDE 30 MILLIGRAM(S): 30 CAPSULE, DELAYED RELEASE ORAL at 09:25

## 2021-07-10 RX ADMIN — GABAPENTIN 300 MILLIGRAM(S): 400 CAPSULE ORAL at 13:21

## 2021-07-10 RX ADMIN — OXYCODONE AND ACETAMINOPHEN 1 TABLET(S): 5; 325 TABLET ORAL at 13:26

## 2021-07-10 RX ADMIN — RIVAROXABAN 20 MILLIGRAM(S): KIT at 21:21

## 2021-07-10 RX ADMIN — SENNA PLUS 2 TABLET(S): 8.6 TABLET ORAL at 21:21

## 2021-07-10 RX ADMIN — OXYCODONE AND ACETAMINOPHEN 1 TABLET(S): 5; 325 TABLET ORAL at 05:57

## 2021-07-10 RX ADMIN — Medication 2: at 16:13

## 2021-07-10 RX ADMIN — PANTOPRAZOLE SODIUM 40 MILLIGRAM(S): 20 TABLET, DELAYED RELEASE ORAL at 05:56

## 2021-07-10 RX ADMIN — Medication 112 MICROGRAM(S): at 05:57

## 2021-07-10 RX ADMIN — GABAPENTIN 300 MILLIGRAM(S): 400 CAPSULE ORAL at 05:57

## 2021-07-10 RX ADMIN — FENTANYL CITRATE 1 PATCH: 50 INJECTION INTRAVENOUS at 08:07

## 2021-07-10 RX ADMIN — Medication 1 MILLIGRAM(S): at 09:25

## 2021-07-10 RX ADMIN — Medication 81 MILLIGRAM(S): at 09:25

## 2021-07-10 RX ADMIN — INSULIN GLARGINE 5 UNIT(S): 100 INJECTION, SOLUTION SUBCUTANEOUS at 21:55

## 2021-07-10 RX ADMIN — POLYETHYLENE GLYCOL 3350 17 GRAM(S): 17 POWDER, FOR SOLUTION ORAL at 09:25

## 2021-07-10 NOTE — PROGRESS NOTE ADULT - SUBJECTIVE AND OBJECTIVE BOX
HPI:  76/ F with PMHx of ovarian CA ([facility] patient, Dxs in April 2021, on Chemo no radiation), DVT on Xarelto, HTN, HLD and asthma , DM, chronic back pain secondary to herniated disc presents to the ED c/o facial droop, slurred speech and Right sided weakness. Per pt's son, pt was outside with son when pt c/o R sided weakness on lower and upper extremities in addition to R sided facial droop. Son brought mom to hospital immediately. Pt is on Xarelto since pt had similar symptoms and was evaluated for a TIA 2 weeks ago and saw Dr. Lopez who put her on an anticoagulant??. Work up included: CTH- negative for acute ischemia, EEG- neg for seizure, MR brain- No acute or subacute stroke. Pt took Xarelto this morning as the most recent dose. Currently states that there is a slight improvement of extremities. Pt was seen y neuro and deemed not to be tPA candidate as on xarelto.     7/7: Pt assessed at bedside. States that her difficulty speaking and weakness has improved. c/o of nausea. Denies chest pain, SOB.   7/8: Pt states that shes depressed. Reports that she was unable to have the MRI done because of feeling very claustrophobic and anxious and states that the xanax did not help. Pt also c/o constant epigastric abd pain and then intermittent sharp abd   7/9: very tearful and stressed about current clinical condition. feels unsupported w family. pain still present w/ some relief. did require mult. PRNs overnight. d/w pall team, will start fentanyl patch.         PHYSICAL EXAM:  Constitutional: NAD, awake and alert  HEENT:  Normal Hearing, MMM  Neck: Soft and supple   Respiratory: Breath sounds are clear bilaterally   Cardiovascular: S1 and S2, RRR  Gastrointestinal: Bowel Sounds present, soft, nontender, nondistended, no guarding, no rebound  Extremities: No peripheral edema  Vascular: 2+ peripheral pulses  Neurological: A/O x 3, no gross focal deficits, pronator drift neg. fluent speech, no facial droop, EOMI. moves all extremities, finger to nose test intact  Musculoskeletal: 5/5 strength b/l upper and lower extremities  Skin: No rashes    RADIOLOGY:  < from: CT Brain Stroke Protocol (07.06.21 @ 14:33) >  IMPRESSION: Atrophy and small vessel white matter ischemic changes. No change since 6/20/2021. No hemorrhage.  CT perfusion demonstrates 2 small areas at risk for infarction, in the temporal regions bilaterally.  CTA is limited by motion but demonstrates no large vessel occlusion.  < end of copied text >    ECHOCARDIOGRAM:   < from: TTE Echo Complete w/o Contrast w/ Doppler (06.21.21 @ 10:39) >   The left ventricle is normal in size, wall thickness, wall motion and   contractility.   Estimated left ventricular ejection fraction is 55 %.   The left atrium is mildly dilated.   Minimal Fibrocalcific changes noted to the Aortic valve leaflets with   preserved leaflet excursion.   Trace aortic regurgitation is present.   Fibrocalcific changes noted to the mitral valve leaflets with preserved   leaflet excursion.   Moderate (2+) mitral regurgitation is present.   Mild to Moderate Tricuspid regurgitation is present.   Mild pulmonary hypertension.    TELEMETRY REVIEW:  7/7, 7/8: NSR 70-80s. -- D/C TELE     HPI:  76/ F with PMHx of ovarian CA ([facility] patient, Dxs in April 2021, on Chemo no radiation), DVT on Xarelto, HTN, HLD and asthma , DM, chronic back pain secondary to herniated disc presents to the ED c/o facial droop, slurred speech and Right sided weakness. Per pt's son, pt was outside with son when pt c/o R sided weakness on lower and upper extremities in addition to R sided facial droop. Son brought mom to hospital immediately. Pt is on Xarelto since pt had similar symptoms and was evaluated for a TIA 2 weeks ago and saw Dr. Lopez who put her on an anticoagulant??. Work up included: CTH- negative for acute ischemia, EEG- neg for seizure, MR brain- No acute or subacute stroke. Pt took Xarelto this morning as the most recent dose. Currently states that there is a slight improvement of extremities. Pt was seen y neuro and deemed not to be tPA candidate as on xarelto.     7/7: Pt assessed at bedside. States that her difficulty speaking and weakness has improved. c/o of nausea. Denies chest pain, SOB.   7/8: Pt states that shes depressed. Reports that she was unable to have the MRI done because of feeling very claustrophobic and anxious and states that the xanax did not help. Pt also c/o constant epigastric abd pain and then intermittent sharp abd   7/9: very tearful and stressed about current clinical condition. feels unsupported w family. pain still present w/ some relief. did require mult. PRNs overnight. d/w pall team, will start fentanyl patch.   7/10: no cp palps sob abdo pain; had chr tinglign numbness in hands and legs; has occ mild lightheadedness,   later in the day she walked with the asst of RW and family  pain improving with fentanyl patch     PHYSICAL EXAM:  Vital Signs Last 24 Hrs  T(F): 97.7 (10 Jul 2021 09:24), Max: 98.3 (09 Jul 2021 20:45)  HR: 56 (10 Jul 2021 00:34) (56 - 61)  BP: 137/63 (10 Jul 2021 00:34) (110/50 - 146/58)  RR: 18 (10 Jul 2021 00:34) (18 - 18)  SpO2: 99% (10 Jul 2021 09:24) (97% - 99%)  Constitutional: NAD, awake and alert  HEENT:  Normal Hearing, MMM  Neck: Soft and supple   Respiratory: Breath sounds are clear bilaterally   Cardiovascular: S1 and S2, RRR  Gastrointestinal: Bowel Sounds present, soft, nontender, nondistended, no guarding, no rebound  Extremities: No peripheral edema  Neurological: A/O x 3, no gross focal deficits, pronator drift neg. fluent speech, no facial droop, EOMI. moves all extremities, finger to nose test intact  Musculoskeletal: 5/5 strength b/l upper and lower extremities  Skin: No rashes    RADIOLOGY:  < from: CT Brain Stroke Protocol (07.06.21 @ 14:33) >  IMPRESSION: Atrophy and small vessel white matter ischemic changes. No change since 6/20/2021. No hemorrhage.  CT perfusion demonstrates 2 small areas at risk for infarction, in the temporal regions bilaterally.  CTA is limited by motion but demonstrates no large vessel occlusion.  < end of copied text >    ECHOCARDIOGRAM:   < from: TTE Echo Complete w/o Contrast w/ Doppler (06.21.21 @ 10:39) >   The left ventricle is normal in size, wall thickness, wall motion and   contractility.   Estimated left ventricular ejection fraction is 55 %.   The left atrium is mildly dilated.   Minimal Fibrocalcific changes noted to the Aortic valve leaflets with   preserved leaflet excursion.   Trace aortic regurgitation is present.   Fibrocalcific changes noted to the mitral valve leaflets with preserved   leaflet excursion.   Moderate (2+) mitral regurgitation is present.   Mild to Moderate Tricuspid regurgitation is present.   Mild pulmonary hypertension.    TELEMETRY REVIEW:  7/7, 7/8: NSR 70-80s. -- TELE DCed and patient sent to      LABS: All Labs Reviewed:                     10.0   2.22  )-----------( 233      ( 09 Jul 2021 08:01 )             32.8   140  |  110<H>  |  21  ----------------------------<  168<H>  3.4<L>   |  22  |  0.75    MEDS:   ALPRAZolam 0.5 milliGRAM(s) Oral every 8 hours PRN  aspirin  chewable 81 milliGRAM(s) Oral daily  atorvastatin 80 milliGRAM(s) Oral at bedtime  DULoxetine 30 milliGRAM(s) Oral daily  fentaNYL   Patch  12 MICROgram(s)/Hr 1 Patch Transdermal every 72 hours  folic acid 1 milliGRAM(s) Oral daily  gabapentin 300 milliGRAM(s) Oral three times a day  glucagon  Injectable 1 milliGRAM(s) IntraMuscular once  insulin glargine Injectable (LANTUS) 5 Unit(s) SubCutaneous at bedtime  insulin lispro (ADMELOG) corrective regimen sliding scale   SubCutaneous at bedtime  insulin lispro (ADMELOG) corrective regimen sliding scale   SubCutaneous three times a day before meals  levothyroxine 112 MICROGram(s) Oral daily  losartan 50 milliGRAM(s) Oral daily  ondansetron Injectable 4 milliGRAM(s) IV Push every 6 hours PRN  oxycodone    5 mG/acetaminophen 325 mG 1 Tablet(s) Oral every 4 hours PRN  pantoprazole    Tablet 40 milliGRAM(s) Oral before breakfast  polyethylene glycol 3350 17 Gram(s) Oral daily  rivaroxaban 20 milliGRAM(s) Oral daily  senna 2 Tablet(s) Oral at bedtime

## 2021-07-10 NOTE — PROGRESS NOTE ADULT - SUBJECTIVE AND OBJECTIVE BOX
INTERVAL HPI/OVERNIGHT EVENTS:  Patient S&E at bedside. No o/n events, patient resting comfortably.   patient had pall meeting yesterday     VITAL SIGNS:  T(F): 98.2 (07-10-21 @ 00:34)  HR: 56 (07-10-21 @ 00:34)  BP: 137/63 (07-10-21 @ 00:34)  RR: 18 (07-10-21 @ 00:34)  SpO2: 98% (07-10-21 @ 00:34)  Wt(kg): --    PHYSICAL EXAM:    Constitutional: NAD  Eyes: EOMI, sclera non-icteric  Neck: supple, no masses, no JVD  Respiratory: CTA b/l, good air entry b/l  Cardiovascular: RRR, no M/R/G  Gastrointestinal: soft, NTND, no masses palpable, + BS, no hepatosplenomegaly  Extremities: no c/c/e  Neurological: AAOx3      MEDICATIONS  (STANDING):  aspirin  chewable 81 milliGRAM(s) Oral daily  atorvastatin 80 milliGRAM(s) Oral at bedtime  dextrose 40% Gel 15 Gram(s) Oral once  dextrose 5%. 1000 milliLiter(s) (50 mL/Hr) IV Continuous <Continuous>  dextrose 5%. 1000 milliLiter(s) (100 mL/Hr) IV Continuous <Continuous>  dextrose 50% Injectable 25 Gram(s) IV Push once  dextrose 50% Injectable 12.5 Gram(s) IV Push once  dextrose 50% Injectable 25 Gram(s) IV Push once  DULoxetine 30 milliGRAM(s) Oral daily  fentaNYL   Patch  12 MICROgram(s)/Hr 1 Patch Transdermal every 72 hours  folic acid 1 milliGRAM(s) Oral daily  gabapentin 300 milliGRAM(s) Oral three times a day  glucagon  Injectable 1 milliGRAM(s) IntraMuscular once  insulin glargine Injectable (LANTUS) 5 Unit(s) SubCutaneous at bedtime  insulin lispro (ADMELOG) corrective regimen sliding scale   SubCutaneous at bedtime  insulin lispro (ADMELOG) corrective regimen sliding scale   SubCutaneous three times a day before meals  levothyroxine 112 MICROGram(s) Oral daily  losartan 50 milliGRAM(s) Oral daily  pantoprazole    Tablet 40 milliGRAM(s) Oral before breakfast  polyethylene glycol 3350 17 Gram(s) Oral daily  rivaroxaban 20 milliGRAM(s) Oral daily  senna 2 Tablet(s) Oral at bedtime    MEDICATIONS  (PRN):  ALPRAZolam 0.5 milliGRAM(s) Oral every 8 hours PRN Anxiety  ondansetron Injectable 4 milliGRAM(s) IV Push every 6 hours PRN Nausea  oxycodone    5 mG/acetaminophen 325 mG 1 Tablet(s) Oral every 4 hours PRN Moderate Pain (4 - 6)      Allergies    Betadine (Unknown)  tetracycline (Unknown)    Intolerances        LABS:                        10.0   2.22  )-----------( 233      ( 09 Jul 2021 08:01 )             32.8     07-09    140  |  110<H>  |  21  ----------------------------<  168<H>  3.4<L>   |  22  |  0.75    Ca    8.6      09 Jul 2021 08:01    TPro  6.1  /  Alb  2.9<L>  /  TBili  0.3  /  DBili  x   /  AST  30  /  ALT  32  /  AlkPhos  82  07-09          RADIOLOGY & ADDITIONAL TESTS:  Studies reviewed.    ASSESSMENT & PLAN:

## 2021-07-10 NOTE — PROGRESS NOTE ADULT - ASSESSMENT
1.  Depression-     palliative care evaluation appreciated     - likely with situational depression given recent diagnosis     - also suspected anxiety - started Xanax PRN      - would benefit from outpatient supportive oncology evaluation   2. Patient now status post 3 cycles of carboplatin and paclitaxel will reassess patient's candidacy and discharge;   3. CVA/TIA-neurology presently following CT did not demonstrate any acute findings; MRI of the brain will be postponed as the patient was severely claustrophobic and unable to tolerate MRI, ? Plan for MRI with anesthesia;  would recommend ongoing medical management continue with statins anti coagulation.  - would proceed with MRI W/WO CONTRAST OF BRAIN WITH ANESTHESIA   4. Leukopenia secondary to chemo induced myelosuppression.     - stable - no need for Filgrastim

## 2021-07-10 NOTE — PROGRESS NOTE ADULT - ASSESSMENT
#Stroke vs TIA  #Metabolic encephalopathy - Resolved  - monitor on tele. tele review as above.  - CT perfusion: 2 small areas at risk for infarction, in the temporal regions bilaterally. No LVO  - MRI brain with contrast pending, patient unable to tolerate despite IV meds/distraction techniques. Can not entirely rule out CVA.   - Will cont. ASA/Statin/Xarelto  - EEG - mild abnormal diffuse slowing  - Echo reviewed. EF 55%, no severe valvular abnormalities   - Neuro, PT, Speech consulted    #Persistent Orthostatic Hypotension  - Echo 6/21/21 EF 55%, mod MR, mild, TR, mild pulm HTN  - s/p IVF  - STOPPED nifedipine (7/9)  - Cont. compression socks b/l    #Depression, Anixety  #Acute on Chronic Pain, herniated discs/abd pain  - Cont. Oxycodone q4 PRN  - Fentanyl 12mcg daily (started 7/9)  - Senna, miralax for Bm regimen  - Cymbalta started for pain/depression - patient hesitate about starting per psych.   - Palliative, Psych f/u appreciated     #HLD | HTN  - Cont. Statin, Losartan  - Nifedipine held due to orthostasis     #DM Type 2  - hold any oral DM meds, pt on Tresiba 30u qhs at home  - Cont. Lantus 5U HS, ISS  - a1c 6.6     #Malnutrition  #Folic Acid Deficiency   - nutrition eval pending.     #HX DVT, DVT ppx  - Cont. Xarelto    Dispo: home w/ home PT  Plan for family meeting today with palliative on 7/9 at 3pm     #Ovarian CA  #Leukopenia   - Dx in April 2021  - As per pt, Last chemo 6/30 with carboplatin/taxol for total of 3 rounds  - Patient experiencing mult. s/e r/t chemo  - Heme/Onc consulted     #Stroke vs TIA  #Metabolic encephalopathy - Resolved  - monitor on tele. tele review as above.  - CT perfusion: 2 small areas at risk for infarction, in the temporal regions bilaterally. No LVO  - MRI brain with contrast pending, patient unable to tolerate despite IV meds/distraction techniques. Can not entirely rule out CVA.   - Will cont. ASA/Statin/Xarelto  - EEG - mild abnormal diffuse slowing  - Echo reviewed. EF 55%, no severe valvular abnormalities   - Neuro, PT, Speech consulted  7/10 - OOB today, neuro stable     #Persistent Orthostatic Hypotension  - Echo 6/21/21 EF 55%, mod MR, mild, TR, mild pulm HTN  - s/p IVF  - STOPPED nifedipine (7/9)  - Cont. compression socks b/l    #Depression, Anixety  #Acute on Chronic Pain, herniated discs/abd pain  - Cont. Oxycodone q4 PRN  - Fentanyl 12mcg daily (started 7/9)  - Senna, miralax for Bm regimen  - Cymbalta started for pain/depression - patient hesitate about starting per psych.   - Palliative, Psych f/u appreciated   7/10 - pain improving with fentanyl patch, encourage OOB     #HLD | HTN  - Cont. Statin, Losartan  - Nifedipine held due to orthostasis   7/10 - cont to hold CCB     #DM Type 2  - hold any oral DM meds, pt on Tresiba 30u qhs at home  - Cont. Lantus 5U HS, ISS  - a1c 6.6     #Malnutrition  #Folic Acid Deficiency   - nutrition eval pending.     #HX DVT, DVT ppx  - Cont. Xarelto    #Ovarian CA  #Leukopenia   - Dx in April 2021  - As per pt, Last chemo 6/30 with carboplatin/taxol for total of 3 rounds  - Patient experiencing mult. s/e r/t chemo    Dispo: home w/ home PT  Pt seen by Psychiatry, discussed with family at bedside, RN and Onc

## 2021-07-11 LAB
ANION GAP SERPL CALC-SCNC: 8 MMOL/L — SIGNIFICANT CHANGE UP (ref 5–17)
BUN SERPL-MCNC: 17 MG/DL — SIGNIFICANT CHANGE UP (ref 7–23)
CALCIUM SERPL-MCNC: 8.5 MG/DL — SIGNIFICANT CHANGE UP (ref 8.5–10.1)
CHLORIDE SERPL-SCNC: 110 MMOL/L — HIGH (ref 96–108)
CO2 SERPL-SCNC: 22 MMOL/L — SIGNIFICANT CHANGE UP (ref 22–31)
CREAT SERPL-MCNC: 0.68 MG/DL — SIGNIFICANT CHANGE UP (ref 0.5–1.3)
GLUCOSE SERPL-MCNC: 134 MG/DL — HIGH (ref 70–99)
HCT VFR BLD CALC: 30.3 % — LOW (ref 34.5–45)
HGB BLD-MCNC: 9.3 G/DL — LOW (ref 11.5–15.5)
MAGNESIUM SERPL-MCNC: 1.6 MG/DL — SIGNIFICANT CHANGE UP (ref 1.6–2.6)
MCHC RBC-ENTMCNC: 25.5 PG — LOW (ref 27–34)
MCHC RBC-ENTMCNC: 30.7 GM/DL — LOW (ref 32–36)
MCV RBC AUTO: 83 FL — SIGNIFICANT CHANGE UP (ref 80–100)
NRBC # BLD: SIGNIFICANT CHANGE UP /100 WBCS (ref 0–0)
PLATELET # BLD AUTO: 195 K/UL — SIGNIFICANT CHANGE UP (ref 150–400)
POTASSIUM SERPL-MCNC: 3.8 MMOL/L — SIGNIFICANT CHANGE UP (ref 3.5–5.3)
POTASSIUM SERPL-SCNC: 3.8 MMOL/L — SIGNIFICANT CHANGE UP (ref 3.5–5.3)
RBC # BLD: 3.65 M/UL — LOW (ref 3.8–5.2)
RBC # FLD: 19 % — HIGH (ref 10.3–14.5)
SODIUM SERPL-SCNC: 140 MMOL/L — SIGNIFICANT CHANGE UP (ref 135–145)
WBC # BLD: 1.82 K/UL — LOW (ref 3.8–10.5)
WBC # FLD AUTO: 1.82 K/UL — LOW (ref 3.8–10.5)

## 2021-07-11 PROCEDURE — 99232 SBSQ HOSP IP/OBS MODERATE 35: CPT

## 2021-07-11 RX ORDER — MAGNESIUM SULFATE 500 MG/ML
1 VIAL (ML) INJECTION ONCE
Refills: 0 | Status: COMPLETED | OUTPATIENT
Start: 2021-07-11 | End: 2021-07-11

## 2021-07-11 RX ADMIN — Medication 100 GRAM(S): at 14:48

## 2021-07-11 RX ADMIN — GABAPENTIN 300 MILLIGRAM(S): 400 CAPSULE ORAL at 22:12

## 2021-07-11 RX ADMIN — Medication 2: at 17:22

## 2021-07-11 RX ADMIN — Medication 1 MILLIGRAM(S): at 09:48

## 2021-07-11 RX ADMIN — Medication 112 MICROGRAM(S): at 05:55

## 2021-07-11 RX ADMIN — OXYCODONE AND ACETAMINOPHEN 1 TABLET(S): 5; 325 TABLET ORAL at 13:15

## 2021-07-11 RX ADMIN — INSULIN GLARGINE 5 UNIT(S): 100 INJECTION, SOLUTION SUBCUTANEOUS at 22:12

## 2021-07-11 RX ADMIN — GABAPENTIN 300 MILLIGRAM(S): 400 CAPSULE ORAL at 13:26

## 2021-07-11 RX ADMIN — Medication 81 MILLIGRAM(S): at 09:48

## 2021-07-11 RX ADMIN — DULOXETINE HYDROCHLORIDE 30 MILLIGRAM(S): 30 CAPSULE, DELAYED RELEASE ORAL at 09:48

## 2021-07-11 RX ADMIN — OXYCODONE AND ACETAMINOPHEN 1 TABLET(S): 5; 325 TABLET ORAL at 12:15

## 2021-07-11 RX ADMIN — ATORVASTATIN CALCIUM 80 MILLIGRAM(S): 80 TABLET, FILM COATED ORAL at 22:12

## 2021-07-11 RX ADMIN — GABAPENTIN 300 MILLIGRAM(S): 400 CAPSULE ORAL at 05:55

## 2021-07-11 RX ADMIN — RIVAROXABAN 20 MILLIGRAM(S): KIT at 22:12

## 2021-07-11 RX ADMIN — FENTANYL CITRATE 1 PATCH: 50 INJECTION INTRAVENOUS at 08:10

## 2021-07-11 RX ADMIN — LOSARTAN POTASSIUM 50 MILLIGRAM(S): 100 TABLET, FILM COATED ORAL at 09:48

## 2021-07-11 RX ADMIN — Medication 2: at 08:07

## 2021-07-11 RX ADMIN — PANTOPRAZOLE SODIUM 40 MILLIGRAM(S): 20 TABLET, DELAYED RELEASE ORAL at 05:56

## 2021-07-11 NOTE — PROGRESS NOTE ADULT - ASSESSMENT
1.  Depression-     palliative care evaluation appreciated     - likely with situational depression given recent diagnosis     - also suspected anxiety - started Xanax PRN      - would benefit from outpatient supportive oncology evaluation   2. Patient now status post 3 cycles of carboplatin and paclitaxel will reassess patient's candidacy and discharge;   3. CVA/TIA-neurology presently following CT did not demonstrate any acute findings; MRI of the brain will be postponed as the patient was severely claustrophobic and unable to tolerate MRI, ? Plan for MRI with anesthesia;  would recommend ongoing medical management continue with statins anti coagulation.  - would proceed with MRI W/WO CONTRAST OF BRAIN WITH ANESTHESIA - patient has declined inpatient MRI as she would prefer outpatient open MRI if feasible   4. Leukopenia secondary to chemo induced myelosuppression.     - stable - no need for Filgrastim     - please note that patient did not receive Filgrastim with last cycle of therapy

## 2021-07-11 NOTE — PROGRESS NOTE ADULT - SUBJECTIVE AND OBJECTIVE BOX
INTERVAL HPI/OVERNIGHT EVENTS:  Patient S&E at bedside. No o/n events, patient resting comfortably. No complaints at this time. Patient denies fever, chills, dizziness, weakness, CP, palpitations, SOB, cough, N/V/D/C, dysuria, changes in bowel movements, LE edema.    VITAL SIGNS:  T(F): 97.5 (07-11-21 @ 09:46)  HR: 61 (07-11-21 @ 09:46)  BP: 129/60 (07-11-21 @ 09:46)  RR: 18 (07-11-21 @ 09:46)  SpO2: 98% (07-11-21 @ 09:46)  Wt(kg): --    PHYSICAL EXAM:    Constitutional: NAD  Eyes: EOMI, sclera non-icteric  Neck: supple, no masses, no JVD  Respiratory: CTA b/l, good air entry b/l  Cardiovascular: RRR, no M/R/G  Gastrointestinal: soft, NTND, no masses palpable, + BS, no hepatosplenomegaly  Extremities: no c/c/e  Neurological: AAOx3      MEDICATIONS  (STANDING):  aspirin  chewable 81 milliGRAM(s) Oral daily  atorvastatin 80 milliGRAM(s) Oral at bedtime  dextrose 40% Gel 15 Gram(s) Oral once  dextrose 5%. 1000 milliLiter(s) (50 mL/Hr) IV Continuous <Continuous>  dextrose 5%. 1000 milliLiter(s) (100 mL/Hr) IV Continuous <Continuous>  dextrose 50% Injectable 25 Gram(s) IV Push once  dextrose 50% Injectable 12.5 Gram(s) IV Push once  dextrose 50% Injectable 25 Gram(s) IV Push once  DULoxetine 30 milliGRAM(s) Oral daily  fentaNYL   Patch  12 MICROgram(s)/Hr 1 Patch Transdermal every 72 hours  folic acid 1 milliGRAM(s) Oral daily  gabapentin 300 milliGRAM(s) Oral three times a day  glucagon  Injectable 1 milliGRAM(s) IntraMuscular once  insulin glargine Injectable (LANTUS) 5 Unit(s) SubCutaneous at bedtime  insulin lispro (ADMELOG) corrective regimen sliding scale   SubCutaneous at bedtime  insulin lispro (ADMELOG) corrective regimen sliding scale   SubCutaneous three times a day before meals  levothyroxine 112 MICROGram(s) Oral daily  losartan 50 milliGRAM(s) Oral daily  magnesium sulfate  IVPB 1 Gram(s) IV Intermittent once  pantoprazole    Tablet 40 milliGRAM(s) Oral before breakfast  polyethylene glycol 3350 17 Gram(s) Oral daily  rivaroxaban 20 milliGRAM(s) Oral daily  senna 2 Tablet(s) Oral at bedtime    MEDICATIONS  (PRN):  ALPRAZolam 0.5 milliGRAM(s) Oral every 8 hours PRN Anxiety  ondansetron Injectable 4 milliGRAM(s) IV Push every 6 hours PRN Nausea  oxycodone    5 mG/acetaminophen 325 mG 1 Tablet(s) Oral every 4 hours PRN Moderate Pain (4 - 6)      Allergies    Betadine (Unknown)  tetracycline (Unknown)    Intolerances        LABS:                        9.3    1.82  )-----------( 195      ( 11 Jul 2021 07:16 )             30.3     07-11    140  |  110<H>  |  17  ----------------------------<  134<H>  3.8   |  22  |  0.68    Ca    8.5      11 Jul 2021 07:16  Mg     1.6     07-11            RADIOLOGY & ADDITIONAL TESTS:  Studies reviewed.    ASSESSMENT & PLAN:

## 2021-07-11 NOTE — PROGRESS NOTE ADULT - SUBJECTIVE AND OBJECTIVE BOX
HPI:  76/ F with PMHx of ovarian CA ([facility] patient, Dxs in April 2021, on Chemo no radiation), DVT on Xarelto, HTN, HLD and asthma , DM, chronic back pain secondary to herniated disc presents to the ED c/o facial droop, slurred speech and Right sided weakness. Per pt's son, pt was outside with son when pt c/o R sided weakness on lower and upper extremities in addition to R sided facial droop. Son brought mom to hospital immediately. Pt is on Xarelto since pt had similar symptoms and was evaluated for a TIA 2 weeks ago and saw Dr. Lopez who put her on an anticoagulant??. Work up included: CTH- negative for acute ischemia, EEG- neg for seizure, MR brain- No acute or subacute stroke. Pt took Xarelto this morning as the most recent dose. Currently states that there is a slight improvement of extremities. Pt was seen y neuro and deemed not to be tPA candidate as on xarelto.     7/7: Pt assessed at bedside. States that her difficulty speaking and weakness has improved. c/o of nausea. Denies chest pain, SOB.   7/8: Pt states that shes depressed. Reports that she was unable to have the MRI done because of feeling very claustrophobic and anxious and states that the xanax did not help. Pt also c/o constant epigastric abd pain and then intermittent sharp abd   7/9: very tearful and stressed about current clinical condition. feels unsupported w family. pain still present w/ some relief. did require mult. PRNs overnight. d/w pall team, will start fentanyl patch.   7/10: no cp palps sob abdo pain; had chr tinglign numbness in hands and legs; has occ mild lightheadedness,   later in the day she walked with the asst of RW and family  pain improving with fentanyl patch     PHYSICAL EXAM:  Vital Signs Last 24 Hrs  T(F): 97.5 (11 Jul 2021 09:46), Max: 98.3 (10 Jul 2021 21:55)  HR: 61 (11 Jul 2021 09:46) (55 - 63)  BP: 129/60 (11 Jul 2021 09:46) (129/60 - 136/53)  RR: 18 (11 Jul 2021 09:46) (18 - 18)  SpO2: 98% (11 Jul 2021 09:46) (97% - 99%)  Constitutional: NAD, awake and alert  HEENT:  Normal Hearing, MMM  Neck: Soft and supple   Respiratory: Breath sounds are clear bilaterally   Cardiovascular: S1 and S2, RRR  Gastrointestinal: Bowel Sounds present, soft, nontender, nondistended, no guarding, no rebound  Extremities: No peripheral edema  Neurological: A/O x 3, no gross focal deficits, pronator drift neg. fluent speech, no facial droop, EOMI. moves all extremities, finger to nose test intact  Musculoskeletal: 5/5 strength b/l upper and lower extremities  Skin: No rashes    RADIOLOGY:  < from: CT Brain Stroke Protocol (07.06.21 @ 14:33) >  IMPRESSION: Atrophy and small vessel white matter ischemic changes. No change since 6/20/2021. No hemorrhage.  CT perfusion demonstrates 2 small areas at risk for infarction, in the temporal regions bilaterally.  CTA is limited by motion but demonstrates no large vessel occlusion.  < end of copied text >    ECHOCARDIOGRAM:   < from: TTE Echo Complete w/o Contrast w/ Doppler (06.21.21 @ 10:39) >   The left ventricle is normal in size, wall thickness, wall motion and   contractility. Estimated left ventricular ejection fraction is 55 %.   The left atrium is mildly dilated.   Minimal Fibrocalcific changes noted to the Aortic valve leaflets with   preserved leaflet excursion.   Fibrocalcific changes noted to the mitral valve leaflets with preserved   leaflet excursion.   Moderate (2+) mitral regurgitation is present.   Mild to Moderate Tricuspid regurgitation is present.   Mild pulmonary hypertension.    TELEMETRY REVIEW:  7/7, 7/8: NSR 70-80s. -- TELE DCed and patient sent to      LABS: All Labs Reviewed:                        9.3    1.82  )-----------( 195      ( 11 Jul 2021 07:16 )             30.3     140  |  110<H>  |  17  ----------------------------<  134<H>  3.8   |  22  |  0.68    Ca    8.5      11 Jul 2021 07:16  Mg     1.6     07-11    ALPRAZolam 0.5 milliGRAM(s) Oral every 8 hours PRN  aspirin  chewable 81 milliGRAM(s) Oral daily  atorvastatin 80 milliGRAM(s) Oral at bedtime  DULoxetine 30 milliGRAM(s) Oral daily  fentaNYL   Patch  12 MICROgram(s)/Hr 1 Patch Transdermal every 72 hours  folic acid 1 milliGRAM(s) Oral daily  gabapentin 300 milliGRAM(s) Oral three times a day  glucagon  Injectable 1 milliGRAM(s) IntraMuscular once  insulin glargine Injectable (LANTUS) 5 Unit(s) SubCutaneous at bedtime  insulin lispro (ADMELOG) corrective regimen sliding scale   SubCutaneous at bedtime  insulin lispro (ADMELOG) corrective regimen sliding scale   SubCutaneous three times a day before meals  levothyroxine 112 MICROGram(s) Oral daily  losartan 50 milliGRAM(s) Oral daily  ondansetron Injectable 4 milliGRAM(s) IV Push every 6 hours PRN  oxycodone    5 mG/acetaminophen 325 mG 1 Tablet(s) Oral every 4 hours PRN  pantoprazole    Tablet 40 milliGRAM(s) Oral before breakfast  polyethylene glycol 3350 17 Gram(s) Oral daily  rivaroxaban 20 milliGRAM(s) Oral daily  senna 2 Tablet(s) Oral at bedtime                               HPI:  76/ F with PMHx of ovarian CA ([facility] patient, Dxs in April 2021, on Chemo no radiation), DVT on Xarelto, HTN, HLD and asthma , DM, chronic back pain secondary to herniated disc presents to the ED c/o facial droop, slurred speech and Right sided weakness. Per pt's son, pt was outside with son when pt c/o R sided weakness on lower and upper extremities in addition to R sided facial droop. Son brought mom to hospital immediately. Pt is on Xarelto since pt had similar symptoms and was evaluated for a TIA 2 weeks ago and saw Dr. Lopez who put her on an anticoagulant??. Work up included: CTH- negative for acute ischemia, EEG- neg for seizure, MR brain- No acute or subacute stroke. Pt took Xarelto this morning as the most recent dose. Currently states that there is a slight improvement of extremities. Pt was seen y neuro and deemed not to be tPA candidate as on xarelto.     7/7: Pt assessed at bedside. States that her difficulty speaking and weakness has improved. c/o of nausea. Denies chest pain, SOB.   7/8: Pt states that shes depressed. Reports that she was unable to have the MRI done because of feeling very claustrophobic and anxious and states that the xanax did not help. Pt also c/o constant epigastric abd pain and then intermittent sharp abd   7/9: very tearful and stressed about current clinical condition. feels unsupported w family. pain still present w/ some relief. did require mult. PRNs overnight. d/w pall team, will start fentanyl patch.   7/10: no cp palps sob abdo pain; had chr tinglign numbness in hands and legs; has occ mild lightheadedness,   later in the day she walked with the asst of RW and family  pain improving with fentanyl patch   7/11 - no cp palps sob abdo pain, pain controlled, sitting up in chair     PHYSICAL EXAM:  Vital Signs Last 24 Hrs  T(F): 97.5 (11 Jul 2021 09:46), Max: 98.3 (10 Jul 2021 21:55)  HR: 61 (11 Jul 2021 09:46) (55 - 63)  BP: 129/60 (11 Jul 2021 09:46) (129/60 - 136/53)  RR: 18 (11 Jul 2021 09:46) (18 - 18)  SpO2: 98% (11 Jul 2021 09:46) (97% - 99%)  Constitutional: NAD, awake and alert  HEENT:  Normal Hearing, MMM  Neck: Soft and supple   Respiratory: Breath sounds are clear bilaterally   Cardiovascular: S1 and S2, RRR  Gastrointestinal: Bowel Sounds present, soft, nontender, nondistended, no guarding, no rebound  Extremities: No peripheral edema  Neurological: A/O x 3, no gross focal deficits, pronator drift neg. fluent speech, no facial droop, EOMI. moves all extremities, finger to nose test intact  Musculoskeletal: 5/5 strength b/l upper and lower extremities  Skin: No rashes    RADIOLOGY:  < from: CT Brain Stroke Protocol (07.06.21 @ 14:33) >  IMPRESSION: Atrophy and small vessel white matter ischemic changes. No change since 6/20/2021. No hemorrhage.  CT perfusion demonstrates 2 small areas at risk for infarction, in the temporal regions bilaterally.  CTA is limited by motion but demonstrates no large vessel occlusion.  < end of copied text >    ECHOCARDIOGRAM:   < from: TTE Echo Complete w/o Contrast w/ Doppler (06.21.21 @ 10:39) >   The left ventricle is normal in size, wall thickness, wall motion and   contractility. Estimated left ventricular ejection fraction is 55 %.   The left atrium is mildly dilated.   Minimal Fibrocalcific changes noted to the Aortic valve leaflets with   preserved leaflet excursion.   Fibrocalcific changes noted to the mitral valve leaflets with preserved   leaflet excursion.   Moderate (2+) mitral regurgitation is present.   Mild to Moderate Tricuspid regurgitation is present.   Mild pulmonary hypertension.    TELEMETRY REVIEW:  7/7, 7/8: NSR 70-80s. -- TELE DCed and patient sent to      LABS: All Labs Reviewed:                        9.3    1.82  )-----------( 195      ( 11 Jul 2021 07:16 )             30.3     140  |  110<H>  |  17  ----------------------------<  134<H>  3.8   |  22  |  0.68    MEDS:   ALPRAZolam 0.5 milliGRAM(s) Oral every 8 hours PRN  aspirin  chewable 81 milliGRAM(s) Oral daily  atorvastatin 80 milliGRAM(s) Oral at bedtime  DULoxetine 30 milliGRAM(s) Oral daily  fentaNYL   Patch  12 MICROgram(s)/Hr 1 Patch Transdermal every 72 hours  folic acid 1 milliGRAM(s) Oral daily  gabapentin 300 milliGRAM(s) Oral three times a day  glucagon  Injectable 1 milliGRAM(s) IntraMuscular once  insulin glargine Injectable (LANTUS) 5 Unit(s) SubCutaneous at bedtime  insulin lispro (ADMELOG) corrective regimen sliding scale   SubCutaneous at bedtime  insulin lispro (ADMELOG) corrective regimen sliding scale   SubCutaneous three times a day before meals  levothyroxine 112 MICROGram(s) Oral daily  losartan 50 milliGRAM(s) Oral daily  ondansetron Injectable 4 milliGRAM(s) IV Push every 6 hours PRN  oxycodone    5 mG/acetaminophen 325 mG 1 Tablet(s) Oral every 4 hours PRN  pantoprazole    Tablet 40 milliGRAM(s) Oral before breakfast  polyethylene glycol 3350 17 Gram(s) Oral daily  rivaroxaban 20 milliGRAM(s) Oral daily  senna 2 Tablet(s) Oral at bedtime                               HPI:  76/ F with PMHx of ovarian CA ([facility] patient, Dxs in April 2021, on Chemo no radiation), DVT on Xarelto, HTN, HLD and asthma , DM, chronic back pain secondary to herniated disc presents to the ED c/o facial droop, slurred speech and Right sided weakness. Per pt's son, pt was outside with son when pt c/o R sided weakness on lower and upper extremities in addition to R sided facial droop. Son brought mom to hospital immediately. Pt is on Xarelto since pt had similar symptoms and was evaluated for a TIA 2 weeks ago and saw Dr. Lopez who put her on an anticoagulant??. Work up included: CTH- negative for acute ischemia, EEG- neg for seizure, MR brain- No acute or subacute stroke. Pt took Xarelto this morning as the most recent dose. Currently states that there is a slight improvement of extremities. Pt was seen y neuro and deemed not to be tPA candidate as on xarelto.     7/7: Pt assessed at bedside. States that her difficulty speaking and weakness has improved. c/o of nausea. Denies chest pain, SOB.   7/8: Pt states that shes depressed. Reports that she was unable to have the MRI done because of feeling very claustrophobic and anxious and states that the xanax did not help. Pt also c/o constant epigastric abd pain and then intermittent sharp abd   7/9: very tearful and stressed about current clinical condition. feels unsupported w family. pain still present w/ some relief. did require mult. PRNs overnight. d/w pall team, will start fentanyl patch.   7/10: no cp palps sob abdo pain; had chr tinglign numbness in hands and legs; has occ mild lightheadedness,   later in the day she walked with the asst of RW and family  pain improving with fentanyl patch   7/11 - is neutropenic/afebrile; no cp palps cough odynophagia  sob abdo pain, pain controlled, sitting up in chair     PHYSICAL EXAM:  Vital Signs Last 24 Hrs  T(F): 97.5 (11 Jul 2021 09:46), Max: 98.3 (10 Jul 2021 21:55)  HR: 61 (11 Jul 2021 09:46) (55 - 63)  BP: 129/60 (11 Jul 2021 09:46) (129/60 - 136/53)  RR: 18 (11 Jul 2021 09:46) (18 - 18)  SpO2: 98% (11 Jul 2021 09:46) (97% - 99%)  Constitutional: NAD, awake and alert  HEENT:  Normal Hearing, MMM  Neck: Soft and supple   Respiratory: Breath sounds are clear bilaterally   Cardiovascular: S1 and S2, RRR  Gastrointestinal: Bowel Sounds present, soft, nontender, nondistended, no guarding, no rebound  Extremities: No peripheral edema  Neurological: A/O x 3, no gross focal deficits, pronator drift neg. fluent speech, no facial droop, EOMI. moves all extremities, finger to nose test intact  Musculoskeletal: 5/5 strength b/l upper and lower extremities  Skin: No rashes    RADIOLOGY:  < from: CT Brain Stroke Protocol (07.06.21 @ 14:33) >  IMPRESSION: Atrophy and small vessel white matter ischemic changes. No change since 6/20/2021. No hemorrhage.  CT perfusion demonstrates 2 small areas at risk for infarction, in the temporal regions bilaterally.  CTA is limited by motion but demonstrates no large vessel occlusion.  < end of copied text >    ECHOCARDIOGRAM:   < from: TTE Echo Complete w/o Contrast w/ Doppler (06.21.21 @ 10:39) >   The left ventricle is normal in size, wall thickness, wall motion and   contractility. Estimated left ventricular ejection fraction is 55 %.   The left atrium is mildly dilated.   Minimal Fibrocalcific changes noted to the Aortic valve leaflets with   preserved leaflet excursion.   Fibrocalcific changes noted to the mitral valve leaflets with preserved   leaflet excursion.   Moderate (2+) mitral regurgitation is present.   Mild to Moderate Tricuspid regurgitation is present.   Mild pulmonary hypertension.    TELEMETRY REVIEW:  7/7, 7/8: NSR 70-80s. -- TELE DCed and patient sent to      LABS: All Labs Reviewed:                        9.3    1.82  )-----------( 195      ( 11 Jul 2021 07:16 )             30.3     140  |  110<H>  |  17  ----------------------------<  134<H>  3.8   |  22  |  0.68    MEDS:   ALPRAZolam 0.5 milliGRAM(s) Oral every 8 hours PRN  aspirin  chewable 81 milliGRAM(s) Oral daily  atorvastatin 80 milliGRAM(s) Oral at bedtime  DULoxetine 30 milliGRAM(s) Oral daily  fentaNYL   Patch  12 MICROgram(s)/Hr 1 Patch Transdermal every 72 hours  folic acid 1 milliGRAM(s) Oral daily  gabapentin 300 milliGRAM(s) Oral three times a day  glucagon  Injectable 1 milliGRAM(s) IntraMuscular once  insulin glargine Injectable (LANTUS) 5 Unit(s) SubCutaneous at bedtime  insulin lispro (ADMELOG) corrective regimen sliding scale   SubCutaneous at bedtime  insulin lispro (ADMELOG) corrective regimen sliding scale   SubCutaneous three times a day before meals  levothyroxine 112 MICROGram(s) Oral daily  losartan 50 milliGRAM(s) Oral daily  ondansetron Injectable 4 milliGRAM(s) IV Push every 6 hours PRN  oxycodone    5 mG/acetaminophen 325 mG 1 Tablet(s) Oral every 4 hours PRN  pantoprazole    Tablet 40 milliGRAM(s) Oral before breakfast  polyethylene glycol 3350 17 Gram(s) Oral daily  rivaroxaban 20 milliGRAM(s) Oral daily  senna 2 Tablet(s) Oral at bedtime

## 2021-07-11 NOTE — PROGRESS NOTE ADULT - ASSESSMENT
#Stroke vs TIA  #Metabolic encephalopathy - Resolved  - monitor on tele. tele review as above.  - CT perfusion: 2 small areas at risk for infarction, in the temporal regions bilaterally. No LVO  - MRI brain with contrast pending, patient unable to tolerate despite IV meds/distraction techniques. Can not entirely rule out CVA.   - Will cont. ASA/Statin/Xarelto  - EEG - mild abnormal diffuse slowing  - Echo reviewed. EF 55%, no severe valvular abnormalities   - Neuro, PT, Speech consulted  7/10 - OOB today, neuro stable     #Persistent Orthostatic Hypotension  - Echo 6/21/21 EF 55%, mod MR, mild, TR, mild pulm HTN  - s/p IVF  - STOPPED nifedipine (7/9)  - Cont. compression socks b/l    #Depression, Anixety  #Acute on Chronic Pain, herniated discs/abd pain  - Cont. Oxycodone q4 PRN  - Fentanyl 12mcg daily (started 7/9)  - Senna, miralax for Bm regimen  - Cymbalta started for pain/depression - patient hesitate about starting per psych.   - Palliative, Psych f/u appreciated   7/10 - pain improving with fentanyl patch, encourage OOB     #HLD | HTN  - Cont. Statin, Losartan  - Nifedipine held due to orthostasis   7/10 - cont to hold CCB     #DM Type 2  - hold any oral DM meds, pt on Tresiba 30u qhs at home  - Cont. Lantus 5U HS, ISS  - a1c 6.6     #Malnutrition  #Folic Acid Deficiency   - nutrition eval pending.     #HX DVT, DVT ppx  - Cont. Xarelto    #Ovarian CA  #Leukopenia   - Dx in April 2021  - As per pt, Last chemo 6/30 with carboplatin/taxol for total of 3 rounds  - Patient experiencing mult. s/e r/t chemo    Dispo: home w/ home PT  Pt seen by Psychiatry, discussed with family at bedside, RN and Onc             #Stroke vs TIA  #Metabolic encephalopathy - Resolved  - monitor on tele. tele review as above.  - CT perfusion: 2 small areas at risk for infarction, in the temporal regions bilaterally. No LVO  - MRI brain with contrast pending, patient unable to tolerate despite IV meds/distraction techniques. Can not entirely rule out CVA.   - Will cont. ASA/Statin/Xarelto  - EEG - mild abnormal diffuse slowing  - Echo reviewed. EF 55%, no severe valvular abnormalities   - Neuro, PT, Speech consulted  7/10 - OOB today, neuro stable   7/11 - neuro stable; discussed with Onc - MRI brain as OP r/o mets     #Persistent Orthostatic Hypotension  - Echo 6/21/21 EF 55%, mod MR, mild, TR, mild pulm HTN  - s/p IVF  - STOPPED nifedipine (7/9)  - Cont. compression socks b/l    #Depression, Anixety  #Acute on Chronic Pain, herniated discs/abd pain  - Cont. Oxycodone q4 PRN  - Fentanyl 12mcg daily (started 7/9)  - Senna, miralax for Bm regimen  - Cymbalta started for pain/depression  - Palliative, Psych f/u appreciated   7/10 - pain improving with fentanyl patch, encourage OOB     #HLD | HTN  - Cont. Statin, Losartan  - Nifedipine held due to orthostasis   7/10 - cont to hold CCB     #DM Type 2  - hold any oral DM meds, pt on Tresiba 30u qhs at home  - Cont. Lantus 5U HS, ISS  - a1c 6.6     #Malnutrition  #Folic Acid Deficiency   - nutrition eval pending.     #HX DVT, DVT ppx  - Cont. Xarelto    #Ovarian CA  #Leukopenia   - Dx in April 2021  - As per pt, Last chemo 6/30 with carboplatin/taxol for total of 3 rounds  - Patient experiencing mult. s/e r/t chemo    Dispo 7/11: home w/ home PT tomorrow   Pt seen by Pall Care and Psychiatry, discussed with family at bedside 7/10,   Discussed with RN and Onc             #Stroke vs TIA  #Metabolic encephalopathy - Resolved  - monitor on tele. tele review as above.  - CT perfusion: 2 small areas at risk for infarction, in the temporal regions bilaterally. No LVO  - MRI brain with contrast pending, patient unable to tolerate despite IV meds/distraction techniques. Can not entirely rule out CVA.   - Will cont. ASA/Statin/Xarelto  - EEG - mild abnormal diffuse slowing  - Echo reviewed. EF 55%, no severe valvular abnormalities   - Neuro, PT, Speech consulted  7/10 - OOB today, neuro stable   7/11 - neuro stable; discussed with Onc - MRI brain as OP r/o mets     #Persistent Orthostatic Hypotension  - Echo 6/21/21 EF 55%, mod MR, mild, TR, mild pulm HTN  - s/p IVF  - STOPPED nifedipine (7/9)  - Cont. compression socks b/l    #Depression, Anixety  #Acute on Chronic Pain, herniated discs/abd pain  - Cont. Oxycodone q4 PRN  - Fentanyl 12mcg daily (started 7/9)  - Senna, miralax for Bm regimen  - Cymbalta started for pain/depression  - Palliative, Psych f/u appreciated   7/10 - pain improving with fentanyl patch, encourage OOB     #HLD | HTN  - Cont. Statin, Losartan  - Nifedipine held due to orthostasis   7/10 - cont to hold CCB     #DM Type 2  - hold any oral DM meds, pt on Tresiba 30u qhs at home  - Cont. Lantus 5U HS, ISS  - a1c 6.6     #Malnutrition  #Folic Acid Deficiency   - nutrition eval pending.     #HX DVT, DVT ppx  - Cont. Xarelto    #Ovarian CA  #Leukopenia   - Dx in April 2021  - As per pt, Last chemo 6/30 with carboplatin/taxol for total of 3 rounds  - Patient experiencing mult. s/e r/t chemo  7/11 - Neutropenia, on neutropenic precautions, no fever   AM labs     Dispo 7/11: home w/ home PT tomorrow   Pt seen by Pall Care and Psychiatry, discussed with family at bedside 7/10,   Discussed with RN and Onc  f/u Dr Murdock/Onc as OP - she gets her pain meds from the Oncology office as well.

## 2021-07-12 VITALS
OXYGEN SATURATION: 100 % | HEART RATE: 55 BPM | SYSTOLIC BLOOD PRESSURE: 168 MMHG | TEMPERATURE: 98 F | RESPIRATION RATE: 17 BRPM | DIASTOLIC BLOOD PRESSURE: 58 MMHG

## 2021-07-12 LAB
ANION GAP SERPL CALC-SCNC: 6 MMOL/L — SIGNIFICANT CHANGE UP (ref 5–17)
BASOPHILS # BLD AUTO: 0.03 K/UL — SIGNIFICANT CHANGE UP (ref 0–0.2)
BASOPHILS NFR BLD AUTO: 1.5 % — SIGNIFICANT CHANGE UP (ref 0–2)
BUN SERPL-MCNC: 14 MG/DL — SIGNIFICANT CHANGE UP (ref 7–23)
CALCIUM SERPL-MCNC: 9 MG/DL — SIGNIFICANT CHANGE UP (ref 8.5–10.1)
CHLORIDE SERPL-SCNC: 110 MMOL/L — HIGH (ref 96–108)
CO2 SERPL-SCNC: 25 MMOL/L — SIGNIFICANT CHANGE UP (ref 22–31)
CREAT SERPL-MCNC: 0.67 MG/DL — SIGNIFICANT CHANGE UP (ref 0.5–1.3)
EOSINOPHIL # BLD AUTO: 0.15 K/UL — SIGNIFICANT CHANGE UP (ref 0–0.5)
EOSINOPHIL NFR BLD AUTO: 7.7 % — HIGH (ref 0–6)
GLUCOSE SERPL-MCNC: 158 MG/DL — HIGH (ref 70–99)
HCT VFR BLD CALC: 32.2 % — LOW (ref 34.5–45)
HGB BLD-MCNC: 9.8 G/DL — LOW (ref 11.5–15.5)
IMM GRANULOCYTES NFR BLD AUTO: 1 % — SIGNIFICANT CHANGE UP (ref 0–1.5)
LYMPHOCYTES # BLD AUTO: 1.23 K/UL — SIGNIFICANT CHANGE UP (ref 1–3.3)
LYMPHOCYTES # BLD AUTO: 63.1 % — HIGH (ref 13–44)
MAGNESIUM SERPL-MCNC: 1.7 MG/DL — SIGNIFICANT CHANGE UP (ref 1.6–2.6)
MCHC RBC-ENTMCNC: 25.3 PG — LOW (ref 27–34)
MCHC RBC-ENTMCNC: 30.4 GM/DL — LOW (ref 32–36)
MCV RBC AUTO: 83 FL — SIGNIFICANT CHANGE UP (ref 80–100)
MONOCYTES # BLD AUTO: 0.26 K/UL — SIGNIFICANT CHANGE UP (ref 0–0.9)
MONOCYTES NFR BLD AUTO: 13.3 % — SIGNIFICANT CHANGE UP (ref 2–14)
NEUTROPHILS # BLD AUTO: 0.26 K/UL — LOW (ref 1.8–7.4)
NEUTROPHILS NFR BLD AUTO: 13.4 % — LOW (ref 43–77)
PLATELET # BLD AUTO: 236 K/UL — SIGNIFICANT CHANGE UP (ref 150–400)
POTASSIUM SERPL-MCNC: 4.5 MMOL/L — SIGNIFICANT CHANGE UP (ref 3.5–5.3)
POTASSIUM SERPL-SCNC: 4.5 MMOL/L — SIGNIFICANT CHANGE UP (ref 3.5–5.3)
RBC # BLD: 3.88 M/UL — SIGNIFICANT CHANGE UP (ref 3.8–5.2)
RBC # FLD: 18.9 % — HIGH (ref 10.3–14.5)
SODIUM SERPL-SCNC: 141 MMOL/L — SIGNIFICANT CHANGE UP (ref 135–145)
WBC # BLD: 1.95 K/UL — LOW (ref 3.8–10.5)
WBC # FLD AUTO: 1.95 K/UL — LOW (ref 3.8–10.5)

## 2021-07-12 PROCEDURE — 99239 HOSP IP/OBS DSCHRG MGMT >30: CPT

## 2021-07-12 PROCEDURE — 99232 SBSQ HOSP IP/OBS MODERATE 35: CPT

## 2021-07-12 RX ORDER — SENNA PLUS 8.6 MG/1
2 TABLET ORAL
Qty: 30 | Refills: 0
Start: 2021-07-12 | End: 2021-07-26

## 2021-07-12 RX ORDER — FENTANYL CITRATE 50 UG/ML
1 INJECTION INTRAVENOUS
Qty: 3 | Refills: 0
Start: 2021-07-12 | End: 2021-07-21

## 2021-07-12 RX ORDER — HYDRALAZINE HCL 50 MG
25 TABLET ORAL ONCE
Refills: 0 | Status: COMPLETED | OUTPATIENT
Start: 2021-07-12 | End: 2021-07-12

## 2021-07-12 RX ORDER — FOLIC ACID 0.8 MG
1 TABLET ORAL
Qty: 30 | Refills: 0
Start: 2021-07-12

## 2021-07-12 RX ORDER — ALPRAZOLAM 0.25 MG
1 TABLET ORAL
Qty: 10 | Refills: 0
Start: 2021-07-12 | End: 2021-07-21

## 2021-07-12 RX ORDER — DULOXETINE HYDROCHLORIDE 30 MG/1
1 CAPSULE, DELAYED RELEASE ORAL
Qty: 30 | Refills: 0
Start: 2021-07-12 | End: 2021-08-10

## 2021-07-12 RX ADMIN — Medication 25 MILLIGRAM(S): at 05:19

## 2021-07-12 RX ADMIN — FENTANYL CITRATE 1 PATCH: 50 INJECTION INTRAVENOUS at 16:18

## 2021-07-12 RX ADMIN — DULOXETINE HYDROCHLORIDE 30 MILLIGRAM(S): 30 CAPSULE, DELAYED RELEASE ORAL at 09:22

## 2021-07-12 RX ADMIN — Medication 81 MILLIGRAM(S): at 09:22

## 2021-07-12 RX ADMIN — GABAPENTIN 300 MILLIGRAM(S): 400 CAPSULE ORAL at 13:38

## 2021-07-12 RX ADMIN — LOSARTAN POTASSIUM 50 MILLIGRAM(S): 100 TABLET, FILM COATED ORAL at 07:50

## 2021-07-12 RX ADMIN — OXYCODONE AND ACETAMINOPHEN 1 TABLET(S): 5; 325 TABLET ORAL at 12:00

## 2021-07-12 RX ADMIN — Medication 112 MICROGRAM(S): at 05:19

## 2021-07-12 RX ADMIN — FENTANYL CITRATE 1 PATCH: 50 INJECTION INTRAVENOUS at 16:00

## 2021-07-12 RX ADMIN — RIVAROXABAN 20 MILLIGRAM(S): KIT at 12:01

## 2021-07-12 RX ADMIN — GABAPENTIN 300 MILLIGRAM(S): 400 CAPSULE ORAL at 05:19

## 2021-07-12 RX ADMIN — Medication 1 MILLIGRAM(S): at 09:22

## 2021-07-12 RX ADMIN — PANTOPRAZOLE SODIUM 40 MILLIGRAM(S): 20 TABLET, DELAYED RELEASE ORAL at 05:27

## 2021-07-12 RX ADMIN — Medication 2: at 12:01

## 2021-07-12 RX ADMIN — OXYCODONE AND ACETAMINOPHEN 1 TABLET(S): 5; 325 TABLET ORAL at 11:11

## 2021-07-12 RX ADMIN — FENTANYL CITRATE 1 PATCH: 50 INJECTION INTRAVENOUS at 08:00

## 2021-07-12 NOTE — PROGRESS NOTE ADULT - ASSESSMENT
HPI: Pt is a 76y old Female with hx of 76/ F with PMHx of ovarian CA ([facility] patient, Dxs in April 2021, on Chemo no radiation), DVT on Xarelto, HTN, HLD and asthma , DM, chronic back pain secondary to herniated disc presents to the ED c/o facial droop, slurred speech and Right sided weakness. Per pt's son, pt was outside with son when pt c/o R sided weakness on lower and upper extremities in addition to R sided facial droop. Pt is on Xarelto since pt had similar symptoms and was evaluated for a TIA 2 weeks ago and saw Dr. Lopez who put her on an anticoagulant. Pt currently on Xarelto. Symptoms have resolved and work up is in progress. Palliative Medicine Consult to establish GOC.  7/8/21 Seen and examined at bedside with daughter in law present. Pt was just medicated for back and generalized pain and is sleepy. Medical hx provided by DIL.     Assessment and Plan:    1) TIA vs STROKE  -Ct head no acute findings  -Pt is not a candidate for t-PA as she took her Xarelto and aspirin last night.   -MRI brain pending as out pt/needs open  -MRI 6/22 noted  -Resolution of symptoms  -Neuro eval noted    2) Pain  -chronic back pain R/T herniated discs  -Generalized bone pain  -Neuropathy  pain R/T DM  -Cont Percocet 5 mg PO Q4H PRN/initiated today  -Cont Fentanyl transderm  12 mcg/hr    3) Depression/Anxiety  -Situational  -Loss of independence  -Cancer diagnosis  -Pain  -Psych eval pending  -Add Xanax 0.5 mg PO Q6H PRN    4) Ovarian Cancer  -Diagnosed April 21  -[place] [facility]  -As per D[facility] responding to treatment  -Onc eval noted    5) Advanced Directives  -Pt with capacity  -Son Mayur named HCP  -GOC discussion with family and pt 7/9 3P  -[place] and [place] [facility]

## 2021-07-12 NOTE — PROGRESS NOTE ADULT - ASSESSMENT
1.  Depression-     palliative care evaluation appreciated     - likely with situational depression given recent diagnosis     - also suspected anxiety - started Xanax PRN      - would benefit from outpatient supportive oncology evaluation   2. Patient now status post 3 cycles of carboplatin and paclitaxel - f/u with Dr. Murdock after d/c home.  3. CVA/TIA-neurology presently following CT did not demonstrate any acute findings; MRI of the brain will be postponed as the patient was severely claustrophobic and unable to tolerate MRI, ? Plan for MRI with anesthesia;  would recommend ongoing medical management continue with statins anti coagulation.   - patient has declined inpatient MRI as she would prefer outpatient open MRI if feasible     4. Leukopenia secondary to chemo induced myelosuppression.     - stable - no need for Filgrastim     - please note that patient did not receive Filgrastim with last cycle of therapy    - would recommend f/u with Dr. Murdock this week    no objection to d/c home   We will be happy to answer any onc questions on behalf of [facility] and will be in touch with them.    Umair Vasquez MD  Hematology/Oncology  Cell:  [phone]  Office Phone: [phone]  Office Fax:  [phone] [address] [place] [address]

## 2021-07-12 NOTE — PROGRESS NOTE ADULT - PROVIDER SPECIALTY LIST ADULT
Heme/Onc
Hospitalist
Heme/Onc
Hospitalist
Hospitalist
Heme/Onc
Hospitalist
Hospitalist
Palliative Care
Palliative Care

## 2021-07-12 NOTE — PROGRESS NOTE ADULT - SUBJECTIVE AND OBJECTIVE BOX
no new events    ALPRAZolam 0.5 milliGRAM(s) Oral every 8 hours PRN  aspirin  chewable 81 milliGRAM(s) Oral daily  atorvastatin 80 milliGRAM(s) Oral at bedtime  DULoxetine 30 milliGRAM(s) Oral daily  fentaNYL   Patch  12 MICROgram(s)/Hr 1 Patch Transdermal every 72 hours  folic acid 1 milliGRAM(s) Oral daily  gabapentin 300 milliGRAM(s) Oral three times a day  glucagon  Injectable 1 milliGRAM(s) IntraMuscular once  insulin glargine Injectable (LANTUS) 5 Unit(s) SubCutaneous at bedtime  insulin lispro (ADMELOG) corrective regimen sliding scale   SubCutaneous three times a day before meals  insulin lispro (ADMELOG) corrective regimen sliding scale   SubCutaneous at bedtime  levothyroxine 112 MICROGram(s) Oral daily  losartan 50 milliGRAM(s) Oral daily  ondansetron Injectable 4 milliGRAM(s) IV Push every 6 hours PRN  oxycodone    5 mG/acetaminophen 325 mG 1 Tablet(s) Oral every 4 hours PRN  pantoprazole    Tablet 40 milliGRAM(s) Oral before breakfast  polyethylene glycol 3350 17 Gram(s) Oral daily  rivaroxaban 20 milliGRAM(s) Oral daily  senna 2 Tablet(s) Oral at bedtime      Betadine (Unknown)  tetracycline (Unknown)      ROS otherwise negative     T(C): 36.8 (07-12-21 @ 08:35), Max: 36.8 (07-12-21 @ 08:35)  HR: 53 (07-12-21 @ 08:35) (52 - 84)  BP: 161/64 (07-12-21 @ 08:35) (121/49 - 183/66)  RR: 17 (07-12-21 @ 08:35) (17 - 18)  SpO2: 100% (07-12-21 @ 08:35) (98% - 100%)  PHYSICAL EXAM  Gen:  laying in bed, nad  H:  anicteric, eomi  Ext:  no edema                          9.8    1.95  )-----------( 236      ( 12 Jul 2021 08:20 )             32.2                         9.3    1.82  )-----------( 195      ( 11 Jul 2021 07:16 )             30.3                         10.0   2.22  )-----------( 233      ( 09 Jul 2021 08:01 )             32.8   07-12    141  |  110<H>  |  x   ----------------------------<  x   4.5   |  x   |  x     Ca    8.5      11 Jul 2021 07:16  Mg     1.6     07-11

## 2021-07-12 NOTE — PROGRESS NOTE ADULT - REASON FOR ADMISSION
aphasia

## 2021-07-12 NOTE — PROGRESS NOTE ADULT - SUBJECTIVE AND OBJECTIVE BOX
HPI: Pt is a 76y old Female with hx of 76/ F with PMHx of ovarian CA ([facility] patient, Dxs in April 2021, on Chemo no radiation), DVT on Xarelto, HTN, HLD and asthma , DM, chronic back pain secondary to herniated disc presents to the ED c/o facial droop, slurred speech and Right sided weakness. Per pt's son, pt was outside with son when pt c/o R sided weakness on lower and upper extremities in addition to R sided facial droop. Pt is on Xarelto since pt had similar symptoms and was evaluated for a TIA 2 weeks ago and saw Dr. Lopez who put her on an anticoagulant. Pt currently on Xarelto. Symptoms have resolved and work up is in progress. Palliative Medicine Consult to establish GOC.  7/8/21 Seen and examined at bedside with daughter in law present. Pt was just medicated for back and generalized pain and is sleepy. Medical hx provided by [facility].   7/9/21 Seen and examined at bedside with HUGO Alan. Pt C/O back pain and endorses some relief after Percocet given . She is tearful and expressing frustration with her current medical/emotional situation.  7/12/21 Seen and examined at bedside with no family. OOB/chair. States her pain is improved with Fentanyl patch and has required less Percocet for breakthrough    PAIN: (X )Yes   ( )No  Level: mod-severe  Location: back/bone  Intensity:   8/10  Quality: ache  Aggravating Factors: ?  Alleviating Factors: Motrin with some relief  Impact on ADLs: mod    DYSPNEA: ( ) Yes  (X ) No  Level:    PAST MEDICAL & SURGICAL HISTORY:  Ovarian cancer  Asthma  HLD (hyperlipidemia)  DVT (deep venous thrombosis)  HTN (hypertension)    No significant past surgical history    SOCIAL HX:  Was living independently in [place]  Living with sons  Hx opiate tolerance ( )YES  (X )NO    Baseline ADLs  (Prior to Admission)  (X ) Independent   ( )Dependent    FAMILY HISTORY:  Family history unobtainable due to patient condition/lethargy    Review of Systems:    Anxiety-yes  Depression-yes  Physical Discomfort-mod  Dyspnea-denies  Constipation-yes  Anorexia-mild  Fatigue-mod  Weakness-mod  Delirium-no    All other systems reviewed and negative        PHYSICAL EXAM:  ICU Vital Signs Last 24 Hrs  T(C): 36.8 (12 Jul 2021 08:35), Max: 36.8 (12 Jul 2021 08:35)  T(F): 98.3 (12 Jul 2021 08:35), Max: 98.3 (12 Jul 2021 08:35)  HR: 53 (12 Jul 2021 08:35) (52 - 84)  BP: 161/64 (12 Jul 2021 08:35) (121/49 - 183/66)  RR: 17 (12 Jul 2021 08:35) (17 - 18)  SpO2: 100% (12 Jul 2021 08:35) (98% - 100%)    PPSV2: 40-50 %    General: Elderly female in bed in mild distress  Mental Status: A&O X3  HEENT: oral mucosa dry  Lungs: clear to auscultation jazzmine  Cardiac: S1S2+  GI: abd soft NT ND + BS  : voids  Ext: MERIDA = strength  Neuro: no focal def      LABS:                                  9.8    1.95  )-----------( 236      ( 12 Jul 2021 08:20 )             32.2                  PTT - ( 06 Jul 2021 14:39 )  PTT:28.3 sec  Albumin: Albumin, Serum: 3.0 g/dL (07-08 @ 07:27)      Allergies    Betadine (Unknown)  tetracycline (Unknown)    Intolerances      MEDICATIONS  (STANDING):    MEDICATIONS  (PRN):    RADIOLOGY/ADDITIONAL STUDIES:

## 2021-07-12 NOTE — PROGRESS NOTE ADULT - NSICDXPILOT_GEN_ALL_CORE
[place]

## 2021-07-19 DIAGNOSIS — C56.9 MALIGNANT NEOPLASM OF UNSPECIFIED OVARY: ICD-10-CM

## 2021-07-19 DIAGNOSIS — Z79.82 LONG TERM (CURRENT) USE OF ASPIRIN: ICD-10-CM

## 2021-07-19 DIAGNOSIS — F43.21 ADJUSTMENT DISORDER WITH DEPRESSED MOOD: ICD-10-CM

## 2021-07-19 DIAGNOSIS — M54.9 DORSALGIA, UNSPECIFIED: ICD-10-CM

## 2021-07-19 DIAGNOSIS — I95.1 ORTHOSTATIC HYPOTENSION: ICD-10-CM

## 2021-07-19 DIAGNOSIS — F40.240 CLAUSTROPHOBIA: ICD-10-CM

## 2021-07-19 DIAGNOSIS — E53.8 DEFICIENCY OF OTHER SPECIFIED B GROUP VITAMINS: ICD-10-CM

## 2021-07-19 DIAGNOSIS — R47.01 APHASIA: ICD-10-CM

## 2021-07-19 DIAGNOSIS — Z79.01 LONG TERM (CURRENT) USE OF ANTICOAGULANTS: ICD-10-CM

## 2021-07-19 DIAGNOSIS — G81.91 HEMIPLEGIA, UNSPECIFIED AFFECTING RIGHT DOMINANT SIDE: ICD-10-CM

## 2021-07-19 DIAGNOSIS — G93.41 METABOLIC ENCEPHALOPATHY: ICD-10-CM

## 2021-07-19 DIAGNOSIS — E46 UNSPECIFIED PROTEIN-CALORIE MALNUTRITION: ICD-10-CM

## 2021-07-19 DIAGNOSIS — R29.707 NIHSS SCORE 7: ICD-10-CM

## 2021-07-19 DIAGNOSIS — I27.20 PULMONARY HYPERTENSION, UNSPECIFIED: ICD-10-CM

## 2021-07-19 DIAGNOSIS — E11.40 TYPE 2 DIABETES MELLITUS WITH DIABETIC NEUROPATHY, UNSPECIFIED: ICD-10-CM

## 2021-07-19 DIAGNOSIS — D70.1 AGRANULOCYTOSIS SECONDARY TO CANCER CHEMOTHERAPY: ICD-10-CM

## 2021-07-19 DIAGNOSIS — Z79.4 LONG TERM (CURRENT) USE OF INSULIN: ICD-10-CM

## 2021-07-19 DIAGNOSIS — I34.0 NONRHEUMATIC MITRAL (VALVE) INSUFFICIENCY: ICD-10-CM

## 2021-07-19 DIAGNOSIS — J45.909 UNSPECIFIED ASTHMA, UNCOMPLICATED: ICD-10-CM

## 2021-07-19 DIAGNOSIS — F41.9 ANXIETY DISORDER, UNSPECIFIED: ICD-10-CM

## 2021-07-19 DIAGNOSIS — E87.3 ALKALOSIS: ICD-10-CM

## 2021-07-19 DIAGNOSIS — E78.5 HYPERLIPIDEMIA, UNSPECIFIED: ICD-10-CM

## 2021-07-19 DIAGNOSIS — G89.29 OTHER CHRONIC PAIN: ICD-10-CM

## 2021-07-19 DIAGNOSIS — I10 ESSENTIAL (PRIMARY) HYPERTENSION: ICD-10-CM

## 2021-07-19 DIAGNOSIS — I63.9 CEREBRAL INFARCTION, UNSPECIFIED: ICD-10-CM

## 2021-07-19 DIAGNOSIS — Z86.718 PERSONAL HISTORY OF OTHER VENOUS THROMBOSIS AND EMBOLISM: ICD-10-CM

## 2021-07-20 ENCOUNTER — APPOINTMENT (OUTPATIENT)
Dept: INTERNAL MEDICINE | Facility: CLINIC | Age: 76
End: 2021-07-20
Payer: MEDICARE

## 2021-07-20 VITALS — BODY MASS INDEX: 36.88 KG/M2 | WEIGHT: 235 LBS | HEIGHT: 67 IN

## 2021-07-20 VITALS — SYSTOLIC BLOOD PRESSURE: 136 MMHG | DIASTOLIC BLOOD PRESSURE: 84 MMHG

## 2021-07-20 DIAGNOSIS — G62.9 POLYNEUROPATHY, UNSPECIFIED: ICD-10-CM

## 2021-07-20 DIAGNOSIS — C56.9 MALIGNANT NEOPLASM OF UNSPECIFIED OVARY: ICD-10-CM

## 2021-07-20 DIAGNOSIS — M54.9 DORSALGIA, UNSPECIFIED: ICD-10-CM

## 2021-07-20 PROCEDURE — 99214 OFFICE O/P EST MOD 30 MIN: CPT

## 2021-07-20 RX ORDER — FENTANYL 12 UG/H
12 PATCH, EXTENDED RELEASE TRANSDERMAL
Qty: 10 | Refills: 0 | Status: ACTIVE | COMMUNITY
Start: 2021-07-20 | End: 1900-01-01

## 2021-07-20 NOTE — PHYSICAL EXAM
[No Acute Distress] : no acute distress [No JVD] : no jugular venous distention [No Respiratory Distress] : no respiratory distress  [Normal Rate] : normal rate  [Regular Rhythm] : with a regular rhythm [No Edema] : there was no peripheral edema

## 2021-07-20 NOTE — HISTORY OF PRESENT ILLNESS
[de-identified] : 77 y/o presents for post hospital visit. She was admitted to [facility] on 7/6 - 7/12 for suspected TIA.  She had her right arm weakness as well as right facial numbness.  The symptoms reportedly resolved within a day.  Her work-up was reportedly unremarkable but she never had a MRI done because she was not able to tolerate it.  While in the hospital she was started on Duragesic patch for chronic upper back pain and was also started on Cymbalta 30 mg.  She has done well since being home and the pain is very well controlled with the patch.  She is scheduled for her next chemo treatment tomorrow and has been doing very well symptomatically.

## 2021-07-20 NOTE — ASSESSMENT
[FreeTextEntry1] : Recent TIA–she states the symptoms resolved within a day.  She had a CAT scan done that was unremarkable but did not have an MRI done because she could not tolerate it.  She is scheduled to follow-up with neurologist in the next few weeks.  He remains on Xarelto and aspirin for now.\par \par Diabetes–she has been well controlled with Tresiba 30 units daily.  She has been using the NovoLog to cover the sugar spikes that occur when she takes Decadron prior to her chemo treatments.  She has managed them well by doing this.\par \par History of thoracic disc disease/chronic pain–while she was hospitalized she was started on Duragesic 12 mg patch.  She states that she has had significant improvement in her pain with that.  She has had no need for any additional oxycodone 5 mg that she was given as well.  She will continue with the patch for the present time but was told she should follow-up with a pain specialist was spine physician for further treatment.  She had received epidural injections in the past with very good response.  She realizes this will probably have to wait because of her ongoing chemotherapy treatments.\par \par Diabetic peripheral neuropathy–she remains on Neurontin 3 mg 3 times daily and was also started on Cymbalta 30 mg daily while in the hospital.  This was just ordered recently and she will continue for the present time.\par \par Metastatic ovarian cancer–she is due for a fourth chemotherapy treatment tomorrow.  She has done very well with significant improvement in her symptoms as well as tumor sizes.\par \par Total of 35 minutes was spent on this encounter

## 2021-07-23 ENCOUNTER — TRANSCRIPTION ENCOUNTER (OUTPATIENT)
Age: 76
End: 2021-07-23

## 2021-07-26 ENCOUNTER — NON-APPOINTMENT (OUTPATIENT)
Age: 76
End: 2021-07-26

## 2021-08-10 ENCOUNTER — NON-APPOINTMENT (OUTPATIENT)
Age: 76
End: 2021-08-10

## 2021-08-11 ENCOUNTER — APPOINTMENT (OUTPATIENT)
Dept: OTOLARYNGOLOGY | Facility: CLINIC | Age: 76
End: 2021-08-11
Payer: MEDICARE

## 2021-08-11 VITALS
DIASTOLIC BLOOD PRESSURE: 71 MMHG | TEMPERATURE: 97.6 F | HEIGHT: 67 IN | SYSTOLIC BLOOD PRESSURE: 142 MMHG | BODY MASS INDEX: 32.8 KG/M2 | WEIGHT: 209 LBS | HEART RATE: 64 BPM

## 2021-08-11 DIAGNOSIS — Z83.3 FAMILY HISTORY OF DIABETES MELLITUS: ICD-10-CM

## 2021-08-11 DIAGNOSIS — Z78.9 OTHER SPECIFIED HEALTH STATUS: ICD-10-CM

## 2021-08-11 DIAGNOSIS — R13.12 DYSPHAGIA, OROPHARYNGEAL PHASE: ICD-10-CM

## 2021-08-11 DIAGNOSIS — K21.9 GASTRO-ESOPHAGEAL REFLUX DISEASE W/OUT ESOPHAGITIS: ICD-10-CM

## 2021-08-11 DIAGNOSIS — G45.9 TRANSIENT CEREBRAL ISCHEMIC ATTACK, UNSPECIFIED: ICD-10-CM

## 2021-08-11 DIAGNOSIS — Z87.891 PERSONAL HISTORY OF NICOTINE DEPENDENCE: ICD-10-CM

## 2021-08-11 DIAGNOSIS — Z82.3 FAMILY HISTORY OF STROKE: ICD-10-CM

## 2021-08-11 DIAGNOSIS — Z80.9 FAMILY HISTORY OF MALIGNANT NEOPLASM, UNSPECIFIED: ICD-10-CM

## 2021-08-11 PROCEDURE — 31575 DIAGNOSTIC LARYNGOSCOPY: CPT

## 2021-08-11 PROCEDURE — 99204 OFFICE O/P NEW MOD 45 MIN: CPT | Mod: 25

## 2021-08-11 NOTE — REVIEW OF SYSTEMS
[Seasonal Allergies] : seasonal allergies [Post Nasal Drip] : post nasal drip [Negative] : Heme/Lymph [Patient Intake Form Reviewed] : Patient intake form was reviewed [FreeTextEntry7] : heartburn [FreeTextEntry1] : gastritis

## 2021-08-11 NOTE — HISTORY OF PRESENT ILLNESS
[de-identified] : hx possible stroke 1 mo ago \par rt hand weakness and rt facial weakness\par resolved 24 h, had mri and ct\par possible tia\par 4 weeks ago difficulty w swallowing liquids ok w solids and thick liquids\par metastatic ovarian cancer carboplatin rx\par gerd daily pantoprazole\par to have neuro consult

## 2021-08-11 NOTE — REASON FOR VISIT
[Initial Consultation] : an initial consultation for [FreeTextEntry2] : difficulty swallowing liquids

## 2021-08-11 NOTE — ASSESSMENT
[FreeTextEntry1] : larngeal signs reflux\par does wel w solids\par add famotidine 40 q hs to regimen and continue pantoprazole\par ba swallow

## 2021-08-16 ENCOUNTER — APPOINTMENT (OUTPATIENT)
Dept: NEUROLOGY | Facility: CLINIC | Age: 76
End: 2021-08-16
Payer: MEDICARE

## 2021-08-16 PROCEDURE — 99213 OFFICE O/P EST LOW 20 MIN: CPT

## 2021-08-16 NOTE — HISTORY OF PRESENT ILLNESS
[FreeTextEntry1] : 76-year-old right-handed woman, with a history of ovarian carcinoma, metastatic disease, status post chemotherapy, DVT, pulmonary emboli on Xarelto. Was in [facility] in June of this year as well as in early July, with episodes of confusion, disorientation,mild facial droop, slurred speech.all symptoms resolved,imaging did not demonstrate any evidence of a stroke, MRI, CT CT angiogram of the head and neck were performed, did not demonstrate any acute changes for large vessel stenosis. EEG Monitor for possible seizures, which turned out negative.\par Since then doing very well, 2 episodes of falls, related to loss of balance, history of peripheral neuropathy, with numbness, tingling, occasional burning sensation of the feet and hands, on gabapentin.\par

## 2021-08-16 NOTE — DISCUSSION/SUMMARY
[FreeTextEntry1] : 76-year-old woman history of ovarian carcinoma, with metastasis, recent 2 episodes of transient confusion, all symptoms resolved.History of diabetes, peripheral neuropathy, recurrent falls.\par no evidence of acute stroke, history of seizures. Symptoms likely metabolic in nature.\par Plan: Advised to continue with physical therapy, strengthening exercises of the trunk and legs to prevent help prevent falls.\par Followup as needed.

## 2021-08-16 NOTE — DATA REVIEWED
[de-identified] :  EXAM:  MR BRAIN                      \par \par \par PROCEDURE DATE:  06/22/2021  \par \par \par \par INTERPRETATION:  CLINICAL INFORMATION: Aphasia and confusion. Rule out stroke.\par \par TECHNIQUE: MRI brain with 3 plane survey, sagittal T1, axial DWI, axial ADC map, axial FLAIR, axial T1, axial T2, and axial gradient echo images.\par \par COMPARISON:  CT brain 6/20/2021\par \par FINDINGS: Ventricles and cortical sulci are age-appropriate. There is no intracranial bleed, or extra-axial fluid collection. No mass, mass effect or midline shift seen. No acute or subacute stroke demonstrated on diffusion-weighted imaging. The T2 vascular flow voids of the major intracranial vessels are grossly unremarkable. There is mild to moderate bright T2/FLAIR signal hyperintense foci seen within the periventricular deep and subcortical white matter. Imaged orbits demonstrate thin bilateral ocular lenses. Imaged paranasal sinuses and mastoid air cells are grossly unremarkable. Evaluation of the skull base demonstrates normal position of the cerebellar tonsils.\par \par IMPRESSION:\par 1. No acute or subacute stroke.\par 2. Mild to moderate chronic white matter small vessel ischemic disease.\par  [de-identified] :   EXAM:  EEG AWAKE AND DROWSY  \par \par \par PROCEDURE DATE:  07/07/2021  \par \par \par INTERPRETATION:  EEG # [id number]      DOS : 7/7/21      AGE : 76      S : F      LOCATION : 3E\par \par Referring Physician : Dr. ELO johnson             reviewing Physician : Dr.S. Johnson\par \par This is 16-channel EEG performed utilizing referential EEG connections using standard 10-20 electrode placements with EKG. Recording was at the sampling rate of 256 samples per second per channel.\par History: TIA, rule out seizure.\par Medications: Alprazolam, atorvastatin, gabapentin, levothyroxin, losartan, nifedipine,Rivaroxaban.\par Comments: Patient awake, alert, follows commands, answers questions correctly, relaxed.\par \par Study interpretation:\par Background activity is reactive, posteriorly dominant symmetrical 9 Hz 30 uV activity which attenuates with eye opening. There is superimposed low amplitude beta activity noted frontally during wakefulness.\par \par Background slowing:\par Mild intermittent slowing noted.\par \par Focal slowing:\par None was noted.\par \par Sleep background:\par Drowsiness characterized by fragmentation, attenuation and slowing of the background activity.\par Stage II sleep was not achieved.\par \par Other paroxysmal nonepileptic form activity:\par None was noted.\par \par Interictal epileptiform activity:\par No epileptiform activity noted.\par \par Activations procedures:\par Hyperventilation and photic stimulation were not performed.\par \par Artifacts:\par Intermittent myogenic and movement artifacts noted.\par \par EKG:\par Single channel EKG revealed irregular cardiac rhythm.\par \par Impression:\par This is mildly abnormal EEG due to the presence of mild generalized slowing may be on the basis of metabolic, toxic or structural abnormality. No epileptiform activity noted. Clinical correlation recommended.\par  [de-identified] :  EXAM:  CT PERFUSION W MAPS IC                      \par \par EXAM:  CT ANGIO BRAIN (W)AW IC                      \par \par EXAM:  CT BRAIN STROKE PROTOCOL                      \par \par EXAM:  CT ANGIO NECK (W)AW IC                      \par \par \par PROCEDURE DATE:  07/06/2021  \par \par \par \par INTERPRETATION:  Clinical Indication: Code stroke, slurred speech and right-sided weakness\par \par 5mm axial sections of the brain were obtained from base to vertex, without the intravenous administration of contrast material. Coronal and sagittal computer generated reconstructed views are available.\par \par Comparison is made with the prior CT of 6/20/2021 and demonstrates no significant interval change.\par \par AI evaluation for hemorrhage demonstrates no hemorrhage suspected.\par \par \par The ventricles and sulci are prominent consistent age appropriate involutional changes. Small vessel white matter ischemic changes are noted. There is no hemorrhage, mass, or shift of midline structures. Bone window examination is unremarkable. There has been previous bilateral lens replacement surgery.\par \par CT PERFUSION:\par \par After the intravenous administration of 50 cc of Omnipaque 300 350 serial thin sections were obtained through the brain for the purposes of evaluating CT perfusion. Raw data was sent to the rapid ischemia view software for postprocessing. There is no core infarct. There are 2 small areas of delayed mean transit time in the left temporal and right occipital regions.\par \par \par CBF<30% volume: 0 ml\par Tmax> 6.0s volume: 17 ml\par Mismatch volume: 17 ml\par Mismatch ratio: infinite\par \par CTA head and neck:\par \par \par After the intravenous power injection of 70 of Omnipaque 300 using a bolus solis timing run, serial thin sections were obtained through the intracranial circulation centered at the [id number] on a multi slice CT scanner reformatted with coronal and sagittal 2 D-MIP projections, including 3 D reconstructions using a separate 3D [employer] software workstation.A total of 120 cc of Omnipaque were intravenously injected. 30 cc were discarded .\par \par \par \par \par Examination is limited by motion artifact.\par \par The origins of the carotid and vertebral arteries are normal. The carotid bifurcations are patent. The vertebral arteries are codominant.\par \par The distal vertebral arteries, basilar artery and posterior cerebral arteries are visualized. The left P1 segment is hypoplastic and there is a prominent P-comm identified.\par \par Evaluation of the carotid arteries demonstrate normal appearance to the cervical, petrous cavernous and supraclinoid internal carotid arteries. The anterior cerebral arteries anterior communicating artery and posterior cerebral arteries are visualized.\par \par No large vessel occlusion is identified.\par \par \par The intracranial venous circulation is unremarkable.\par \par \par IMPRESSION: Atrophy and small vessel white matter ischemic changes. No change since 6/20/2021. No hemorrhage.\par \par CT perfusion demonstrates 2 small areas at risk for infarction, in the temporal regions bilaterally.\par \par  CTA is limited by motion but demonstrates no large vessel occlusion.\par

## 2021-08-16 NOTE — PHYSICAL EXAM
[General Appearance - Alert] : alert [General Appearance - In No Acute Distress] : in no acute distress [Person] : oriented to person [Place] : oriented to place [Time] : oriented to time [Fluency] : fluency intact [Comprehension] : comprehension intact [Past History] : adequate knowledge of personal past history [Cranial Nerves Optic (II)] : visual acuity intact bilaterally,  visual fields full to confrontation, pupils equal round and reactive to light [Cranial Nerves Oculomotor (III)] : extraocular motion intact [Cranial Nerves Facial (VII)] : face symmetrical [Cranial Nerves Vestibulocochlear (VIII)] : hearing was intact bilaterally [Cranial Nerves Accessory (XI - Cranial And Spinal)] : head turning and shoulder shrug symmetric [Cranial Nerves Hypoglossal (XII)] : there was no tongue deviation with protrusion [Motor Strength] : muscle strength was normal in all four extremities [Paresis Pronator Drift Right-Sided] : no pronator drift on the right [Paresis Pronator Drift Left-Sided] : no pronator drift on the left [Motor Strength Upper Extremities Bilaterally] : strength was normal in both upper extremities [Motor Strength Lower Extremities Bilaterally] : strength was normal in both lower extremities [Abnormal Walk] : normal gait [Dysdiadochokinesia Bilaterally] : not present [Coordination - Dysmetria Impaired Finger-to-Nose Bilateral] : not present [0] : Ankle jerk left 0

## 2021-08-16 NOTE — REVIEW OF SYSTEMS
[As Noted in HPI] : as noted in HPI [Difficulty Walking] : difficulty walking [Frequent Falls] : frequent falls [Joint Swelling] : joint swelling [Easy Bruising] : a tendency for easy bruising [Negative] : Endocrine [FreeTextEntry4] : trouble swallowing

## 2021-08-17 RX ORDER — RIVAROXABAN 20 MG/1
20 TABLET, FILM COATED ORAL
Qty: 90 | Refills: 1 | Status: ACTIVE | COMMUNITY
Start: 2021-08-17 | End: 1900-01-01

## 2021-08-25 ENCOUNTER — APPOINTMENT (OUTPATIENT)
Dept: NEUROLOGY | Facility: CLINIC | Age: 76
End: 2021-08-25

## 2021-10-11 ENCOUNTER — NON-APPOINTMENT (OUTPATIENT)
Age: 76
End: 2021-10-11

## 2021-10-11 ENCOUNTER — APPOINTMENT (OUTPATIENT)
Dept: DERMATOLOGY | Facility: CLINIC | Age: 76
End: 2021-10-11
Payer: MEDICARE

## 2021-10-11 DIAGNOSIS — L81.4 OTHER MELANIN HYPERPIGMENTATION: ICD-10-CM

## 2021-10-11 DIAGNOSIS — L82.1 OTHER SEBORRHEIC KERATOSIS: ICD-10-CM

## 2021-10-11 DIAGNOSIS — D22.9 MELANOCYTIC NEVI, UNSPECIFIED: ICD-10-CM

## 2021-10-11 PROCEDURE — 99203 OFFICE O/P NEW LOW 30 MIN: CPT

## 2021-10-11 NOTE — PHYSICAL EXAM
[FreeTextEntry3] : AAOx3, pleasant, NAD, no visual lymphadenopathy\par hair, scalp, face, nose, eyelids, ears, lips, oropharynx, neck, chest, abdomen, back, right arm, left arm, nails, and hands examined with all normal findings,\par pertinent findings include:\par \par multiple benign nevi and lentigines\par Scaling waxy stuck on papule on right temple and right breast

## 2021-10-11 NOTE — HISTORY OF PRESENT ILLNESS
[FreeTextEntry1] : mole [de-identified] : 76 year old female here for moles. hx of stage 4 ovarain Ca. [place] [facility]

## 2021-10-19 RX ORDER — ELECTROLYTES/DEXTROSE
31G X 8 MM SOLUTION, ORAL ORAL
Qty: 100 | Refills: 3 | Status: ACTIVE | COMMUNITY
Start: 2021-06-23 | End: 1900-01-01

## 2021-11-11 NOTE — BEHAVIORAL HEALTH ASSESSMENT NOTE - NSBHCONSULTOBSREASON_PSY_A_CORE FT
2021      Patient Name: Carolyne West  Patient : 2005        Dear Dr. Yariel Tripp,    Your patient has requested Direct Access Physical or Occupational Therapy for a previously diagnosed medical condition and/or musculoskeletal injury, strain, or sprain.  The evaluation will be sent to your office when completed.  We appreciate this opportunity to serve your patient.     [address] 5TH FLOOR  [address] [facility] [id number] [phone]      
no acute risk for harm to self / others on unit

## 2021-11-22 ENCOUNTER — APPOINTMENT (OUTPATIENT)
Dept: INTERNAL MEDICINE | Facility: CLINIC | Age: 76
End: 2021-11-22
Payer: MEDICARE

## 2021-11-22 VITALS
WEIGHT: 208 LBS | SYSTOLIC BLOOD PRESSURE: 120 MMHG | HEIGHT: 67 IN | DIASTOLIC BLOOD PRESSURE: 80 MMHG | BODY MASS INDEX: 32.65 KG/M2

## 2021-11-22 DIAGNOSIS — E78.5 HYPERLIPIDEMIA, UNSPECIFIED: ICD-10-CM

## 2021-11-22 DIAGNOSIS — Z79.01 LONG TERM (CURRENT) USE OF ANTICOAGULANTS: ICD-10-CM

## 2021-11-22 DIAGNOSIS — I10 ESSENTIAL (PRIMARY) HYPERTENSION: ICD-10-CM

## 2021-11-22 DIAGNOSIS — E11.9 TYPE 2 DIABETES MELLITUS W/OUT COMPLICATIONS: ICD-10-CM

## 2021-11-22 DIAGNOSIS — I82.409 ACUTE EMBOLISM AND THROMBOSIS OF UNSPECIFIED DEEP VEINS OF UNSPECIFIED LOWER EXTREMITY: ICD-10-CM

## 2021-11-22 DIAGNOSIS — E03.9 HYPOTHYROIDISM, UNSPECIFIED: ICD-10-CM

## 2021-11-22 PROCEDURE — 99214 OFFICE O/P EST MOD 30 MIN: CPT | Mod: 25

## 2021-11-22 PROCEDURE — 36415 COLL VENOUS BLD VENIPUNCTURE: CPT

## 2021-11-22 RX ORDER — FAMOTIDINE 40 MG/1
40 TABLET, FILM COATED ORAL
Refills: 0 | Status: ACTIVE | COMMUNITY

## 2021-11-22 RX ORDER — LEVOTHYROXINE SODIUM 0.11 MG/1
112 TABLET ORAL DAILY
Qty: 90 | Refills: 1 | Status: ACTIVE | COMMUNITY
Start: 1900-01-01 | End: 1900-01-01

## 2021-11-22 RX ORDER — NIFEDIPINE 60 MG/1
60 TABLET, EXTENDED RELEASE ORAL
Refills: 0 | Status: ACTIVE | COMMUNITY

## 2021-11-22 RX ORDER — PANTOPRAZOLE 40 MG/1
40 TABLET, DELAYED RELEASE ORAL DAILY
Qty: 90 | Refills: 1 | Status: ACTIVE | COMMUNITY
Start: 2021-05-06 | End: 1900-01-01

## 2021-11-22 RX ORDER — FAMOTIDINE 40 MG/1
40 TABLET, FILM COATED ORAL
Qty: 30 | Refills: 5 | Status: DISCONTINUED | COMMUNITY
Start: 2021-08-11 | End: 2021-11-22

## 2021-11-22 NOTE — ASSESSMENT
[FreeTextEntry1] : Diabetes- f/u A1c was sent. Continue Tresiba 30 for now.\par \par Hyperlipidemia- lipid profile sent. Possibly reduce Lipitor back to 20mg.\par \par Hypertension- BP is controlled on Procardia and Cozaar.\par \par Hypothyroidism- TSH sent. To continue Levothyroxine 112 for now\par \par DVT- She is to remain on Xarelto 20 for now.

## 2021-11-23 LAB
ALBUMIN SERPL ELPH-MCNC: 3.6 G/DL
ALP BLD-CCNC: 162 U/L
ALT SERPL-CCNC: 11 U/L
ANION GAP SERPL CALC-SCNC: 13 MMOL/L
AST SERPL-CCNC: 14 U/L
BASOPHILS # BLD AUTO: 0.07 K/UL
BASOPHILS NFR BLD AUTO: 0.9 %
BILIRUB SERPL-MCNC: 0.7 MG/DL
BUN SERPL-MCNC: 15 MG/DL
CALCIUM SERPL-MCNC: 9.3 MG/DL
CHLORIDE SERPL-SCNC: 103 MMOL/L
CHOLEST SERPL-MCNC: 146 MG/DL
CO2 SERPL-SCNC: 23 MMOL/L
CREAT SERPL-MCNC: 0.73 MG/DL
EOSINOPHIL # BLD AUTO: 0.28 K/UL
EOSINOPHIL NFR BLD AUTO: 3.6 %
ESTIMATED AVERAGE GLUCOSE: 123 MG/DL
GLUCOSE SERPL-MCNC: 87 MG/DL
HBA1C MFR BLD HPLC: 5.9 %
HCT VFR BLD CALC: 37 %
HDLC SERPL-MCNC: 44 MG/DL
HGB BLD-MCNC: 11.4 G/DL
IMM GRANULOCYTES NFR BLD AUTO: 0.9 %
LDLC SERPL CALC-MCNC: 84 MG/DL
LYMPHOCYTES # BLD AUTO: 1.64 K/UL
LYMPHOCYTES NFR BLD AUTO: 20.9 %
MAN DIFF?: NORMAL
MCHC RBC-ENTMCNC: 29.7 PG
MCHC RBC-ENTMCNC: 30.8 GM/DL
MCV RBC AUTO: 96.4 FL
MONOCYTES # BLD AUTO: 0.62 K/UL
MONOCYTES NFR BLD AUTO: 7.9 %
NEUTROPHILS # BLD AUTO: 5.16 K/UL
NEUTROPHILS NFR BLD AUTO: 65.8 %
NONHDLC SERPL-MCNC: 102 MG/DL
PLATELET # BLD AUTO: 456 K/UL
POTASSIUM SERPL-SCNC: 4.4 MMOL/L
PROT SERPL-MCNC: 6.5 G/DL
RBC # BLD: 3.84 M/UL
RBC # FLD: 15.8 %
SODIUM SERPL-SCNC: 139 MMOL/L
T3 SERPL-MCNC: 86 NG/DL
T4 SERPL-MCNC: 9.1 UG/DL
TRIGL SERPL-MCNC: 91 MG/DL
TSH SERPL-ACNC: 2.06 UIU/ML
WBC # FLD AUTO: 7.84 K/UL

## 2021-11-24 RX ORDER — INSULIN LISPRO 100 [IU]/ML
100 INJECTION, SOLUTION INTRAVENOUS; SUBCUTANEOUS
Qty: 1 | Refills: 0 | Status: DISCONTINUED | COMMUNITY
End: 2021-11-24

## 2021-11-29 RX ORDER — INSULIN ASPART 100 [IU]/ML
100 INJECTION, SOLUTION INTRAVENOUS; SUBCUTANEOUS
Qty: 1 | Refills: 0 | Status: ACTIVE | COMMUNITY
Start: 2021-11-24 | End: 1900-01-01

## 2021-12-02 ENCOUNTER — EMERGENCY (EMERGENCY)
Facility: HOSPITAL | Age: 76
LOS: 0 days | Discharge: OTHER INSTITUTION | End: 2021-12-02
Attending: STUDENT IN AN ORGANIZED HEALTH CARE EDUCATION/TRAINING PROGRAM
Payer: MEDICARE

## 2021-12-02 VITALS
DIASTOLIC BLOOD PRESSURE: 69 MMHG | OXYGEN SATURATION: 97 % | TEMPERATURE: 98 F | HEIGHT: 66 IN | RESPIRATION RATE: 18 BRPM | WEIGHT: 203.93 LBS | HEART RATE: 67 BPM | SYSTOLIC BLOOD PRESSURE: 134 MMHG

## 2021-12-02 VITALS
OXYGEN SATURATION: 100 % | SYSTOLIC BLOOD PRESSURE: 179 MMHG | RESPIRATION RATE: 19 BRPM | DIASTOLIC BLOOD PRESSURE: 62 MMHG | HEART RATE: 60 BPM

## 2021-12-02 DIAGNOSIS — E78.5 HYPERLIPIDEMIA, UNSPECIFIED: ICD-10-CM

## 2021-12-02 DIAGNOSIS — N76.4 ABSCESS OF VULVA: ICD-10-CM

## 2021-12-02 DIAGNOSIS — Z79.82 LONG TERM (CURRENT) USE OF ASPIRIN: ICD-10-CM

## 2021-12-02 DIAGNOSIS — Z91.041 RADIOGRAPHIC DYE ALLERGY STATUS: ICD-10-CM

## 2021-12-02 DIAGNOSIS — N93.9 ABNORMAL UTERINE AND VAGINAL BLEEDING, UNSPECIFIED: ICD-10-CM

## 2021-12-02 DIAGNOSIS — Z79.4 LONG TERM (CURRENT) USE OF INSULIN: ICD-10-CM

## 2021-12-02 DIAGNOSIS — R10.9 UNSPECIFIED ABDOMINAL PAIN: ICD-10-CM

## 2021-12-02 DIAGNOSIS — Z88.1 ALLERGY STATUS TO OTHER ANTIBIOTIC AGENTS STATUS: ICD-10-CM

## 2021-12-02 DIAGNOSIS — I10 ESSENTIAL (PRIMARY) HYPERTENSION: ICD-10-CM

## 2021-12-02 DIAGNOSIS — Z79.01 LONG TERM (CURRENT) USE OF ANTICOAGULANTS: ICD-10-CM

## 2021-12-02 LAB
ALBUMIN SERPL ELPH-MCNC: 2.4 G/DL — LOW (ref 3.3–5)
ALP SERPL-CCNC: 150 U/L — HIGH (ref 40–120)
ALT FLD-CCNC: 12 U/L — SIGNIFICANT CHANGE UP (ref 12–78)
ANION GAP SERPL CALC-SCNC: 7 MMOL/L — SIGNIFICANT CHANGE UP (ref 5–17)
APPEARANCE UR: ABNORMAL
APTT BLD: 38 SEC — HIGH (ref 27.5–35.5)
AST SERPL-CCNC: 17 U/L — SIGNIFICANT CHANGE UP (ref 15–37)
BASOPHILS # BLD AUTO: 0.05 K/UL — SIGNIFICANT CHANGE UP (ref 0–0.2)
BASOPHILS NFR BLD AUTO: 0.6 % — SIGNIFICANT CHANGE UP (ref 0–2)
BILIRUB SERPL-MCNC: 0.3 MG/DL — SIGNIFICANT CHANGE UP (ref 0.2–1.2)
BILIRUB UR-MCNC: NEGATIVE — SIGNIFICANT CHANGE UP
BUN SERPL-MCNC: 13 MG/DL — SIGNIFICANT CHANGE UP (ref 7–23)
CALCIUM SERPL-MCNC: 8.6 MG/DL — SIGNIFICANT CHANGE UP (ref 8.5–10.1)
CHLORIDE SERPL-SCNC: 108 MMOL/L — SIGNIFICANT CHANGE UP (ref 96–108)
CO2 SERPL-SCNC: 26 MMOL/L — SIGNIFICANT CHANGE UP (ref 22–31)
COLOR SPEC: YELLOW — SIGNIFICANT CHANGE UP
CREAT SERPL-MCNC: 0.83 MG/DL — SIGNIFICANT CHANGE UP (ref 0.5–1.3)
DIFF PNL FLD: ABNORMAL
EOSINOPHIL # BLD AUTO: 0.34 K/UL — SIGNIFICANT CHANGE UP (ref 0–0.5)
EOSINOPHIL NFR BLD AUTO: 4.4 % — SIGNIFICANT CHANGE UP (ref 0–6)
GLUCOSE SERPL-MCNC: 116 MG/DL — HIGH (ref 70–99)
GLUCOSE UR QL: NEGATIVE MG/DL — SIGNIFICANT CHANGE UP
HCT VFR BLD CALC: 35.7 % — SIGNIFICANT CHANGE UP (ref 34.5–45)
HGB BLD-MCNC: 11.1 G/DL — LOW (ref 11.5–15.5)
IMM GRANULOCYTES NFR BLD AUTO: 1.3 % — SIGNIFICANT CHANGE UP (ref 0–1.5)
INR BLD: 1.46 RATIO — HIGH (ref 0.88–1.16)
KETONES UR-MCNC: NEGATIVE — SIGNIFICANT CHANGE UP
LACTATE SERPL-SCNC: 0.6 MMOL/L — LOW (ref 0.7–2)
LEUKOCYTE ESTERASE UR-ACNC: ABNORMAL
LIDOCAIN IGE QN: 56 U/L — LOW (ref 73–393)
LYMPHOCYTES # BLD AUTO: 1.74 K/UL — SIGNIFICANT CHANGE UP (ref 1–3.3)
LYMPHOCYTES # BLD AUTO: 22.4 % — SIGNIFICANT CHANGE UP (ref 13–44)
MCHC RBC-ENTMCNC: 29.1 PG — SIGNIFICANT CHANGE UP (ref 27–34)
MCHC RBC-ENTMCNC: 31.1 GM/DL — LOW (ref 32–36)
MCV RBC AUTO: 93.7 FL — SIGNIFICANT CHANGE UP (ref 80–100)
MONOCYTES # BLD AUTO: 0.55 K/UL — SIGNIFICANT CHANGE UP (ref 0–0.9)
MONOCYTES NFR BLD AUTO: 7.1 % — SIGNIFICANT CHANGE UP (ref 2–14)
NEUTROPHILS # BLD AUTO: 4.98 K/UL — SIGNIFICANT CHANGE UP (ref 1.8–7.4)
NEUTROPHILS NFR BLD AUTO: 64.2 % — SIGNIFICANT CHANGE UP (ref 43–77)
NITRITE UR-MCNC: NEGATIVE — SIGNIFICANT CHANGE UP
PH UR: 6 — SIGNIFICANT CHANGE UP (ref 5–8)
PLATELET # BLD AUTO: 393 K/UL — SIGNIFICANT CHANGE UP (ref 150–400)
POTASSIUM SERPL-MCNC: 3.8 MMOL/L — SIGNIFICANT CHANGE UP (ref 3.5–5.3)
POTASSIUM SERPL-SCNC: 3.8 MMOL/L — SIGNIFICANT CHANGE UP (ref 3.5–5.3)
PROT SERPL-MCNC: 6.9 GM/DL — SIGNIFICANT CHANGE UP (ref 6–8.3)
PROT UR-MCNC: 30 MG/DL
PROTHROM AB SERPL-ACNC: 16.8 SEC — HIGH (ref 10.6–13.6)
RBC # BLD: 3.81 M/UL — SIGNIFICANT CHANGE UP (ref 3.8–5.2)
RBC # FLD: 15.1 % — HIGH (ref 10.3–14.5)
SODIUM SERPL-SCNC: 141 MMOL/L — SIGNIFICANT CHANGE UP (ref 135–145)
SP GR SPEC: 1.01 — SIGNIFICANT CHANGE UP (ref 1.01–1.02)
UROBILINOGEN FLD QL: 4 MG/DL
WBC # BLD: 7.76 K/UL — SIGNIFICANT CHANGE UP (ref 3.8–10.5)
WBC # FLD AUTO: 7.76 K/UL — SIGNIFICANT CHANGE UP (ref 3.8–10.5)

## 2021-12-02 PROCEDURE — 83605 ASSAY OF LACTIC ACID: CPT

## 2021-12-02 PROCEDURE — 36415 COLL VENOUS BLD VENIPUNCTURE: CPT

## 2021-12-02 PROCEDURE — 87040 BLOOD CULTURE FOR BACTERIA: CPT

## 2021-12-02 PROCEDURE — 99285 EMERGENCY DEPT VISIT HI MDM: CPT

## 2021-12-02 PROCEDURE — 81001 URINALYSIS AUTO W/SCOPE: CPT

## 2021-12-02 PROCEDURE — 86850 RBC ANTIBODY SCREEN: CPT

## 2021-12-02 PROCEDURE — 74177 CT ABD & PELVIS W/CONTRAST: CPT | Mod: MA

## 2021-12-02 PROCEDURE — 99284 EMERGENCY DEPT VISIT MOD MDM: CPT | Mod: 25

## 2021-12-02 PROCEDURE — 85610 PROTHROMBIN TIME: CPT

## 2021-12-02 PROCEDURE — 96374 THER/PROPH/DIAG INJ IV PUSH: CPT | Mod: XU

## 2021-12-02 PROCEDURE — 74177 CT ABD & PELVIS W/CONTRAST: CPT | Mod: 26,MA

## 2021-12-02 PROCEDURE — 83690 ASSAY OF LIPASE: CPT

## 2021-12-02 PROCEDURE — 85025 COMPLETE CBC W/AUTO DIFF WBC: CPT

## 2021-12-02 PROCEDURE — 85730 THROMBOPLASTIN TIME PARTIAL: CPT

## 2021-12-02 PROCEDURE — 80053 COMPREHEN METABOLIC PANEL: CPT

## 2021-12-02 PROCEDURE — 87086 URINE CULTURE/COLONY COUNT: CPT

## 2021-12-02 PROCEDURE — 86901 BLOOD TYPING SEROLOGIC RH(D): CPT

## 2021-12-02 PROCEDURE — 86900 BLOOD TYPING SEROLOGIC ABO: CPT

## 2021-12-02 RX ORDER — SODIUM CHLORIDE 9 MG/ML
1000 INJECTION INTRAMUSCULAR; INTRAVENOUS; SUBCUTANEOUS ONCE
Refills: 0 | Status: COMPLETED | OUTPATIENT
Start: 2021-12-02 | End: 2021-12-02

## 2021-12-02 RX ORDER — PIPERACILLIN AND TAZOBACTAM 4; .5 G/20ML; G/20ML
3.38 INJECTION, POWDER, LYOPHILIZED, FOR SOLUTION INTRAVENOUS ONCE
Refills: 0 | Status: COMPLETED | OUTPATIENT
Start: 2021-12-02 | End: 2021-12-02

## 2021-12-02 RX ADMIN — SODIUM CHLORIDE 1000 MILLILITER(S): 9 INJECTION INTRAMUSCULAR; INTRAVENOUS; SUBCUTANEOUS at 13:33

## 2021-12-02 RX ADMIN — PIPERACILLIN AND TAZOBACTAM 200 GRAM(S): 4; .5 INJECTION, POWDER, LYOPHILIZED, FOR SOLUTION INTRAVENOUS at 19:52

## 2021-12-02 NOTE — ED STATDOCS - OBJECTIVE STATEMENT
75 y/o female with PMHx of Asthma, HTN, HLD, DVT, Ovarian cancer presents to the ED c/o vaginal bleeding. Pt states she had debulking surgery on November 2nd at [facility]. States she was doing well and initially only had vaginal spotting afterwards, but states she is now having vaginal bleeding, with bright red blood. Also endorses some abdominal pain. Has been having normal bowel movements. Denies vomiting. Oncologist: Dr. Tristan Lam at [facility]. Pt endorses she had 4 transfusions after the surgery because her blood count was low. States she had her blood count checked this past Monday, and states it was back to what it was pre-surgery.

## 2021-12-02 NOTE — ED ADULT NURSE NOTE - OBJECTIVE STATEMENT
pt. c/o vaginal bleeding s/p "de-bulking" surgery on 11/2/21 from [facility], hx of ovarian CA. pt. was seen by [facility] on Monday and was told this was residual bleeding from surgery, pt. then states bleeding stopped Tuesday and Wednesday and began again last night.pt is currently not on chemo.

## 2021-12-02 NOTE — ED ADULT TRIAGE NOTE - CHIEF COMPLAINT QUOTE
pt. c/o vaginal bleeding s/p "de-bulking" surgery on 11/2/21 from [facility], hx of ovarian CA. pt. was seen by [facility] on monday and was told this was residual bleeding from surgery, pt. then states bleeding stopped tuesday and wednesday and began again last night.

## 2021-12-02 NOTE — CONSULT NOTE ADULT - ASSESSMENT
75yo woman with hx DM2, DVT, Asthma, Ovarian cancer receiving chemo at [facility] s/p debulking surgery presenting to [facility] with vaginal spotting since procedure and vaginal bleeding and cramping for last 2 weeks. Found to have thick walled abscess within the hysterectomy bed measuring 12.5 x 5.8 x 8.0 cm  and Progression of omental carcinomatosis with increasing size of RIGHT omental implants and new left-sided omental implants on Abdominal CT.  Physical exam showed intact vaginal cuff, and minimal dark red blood in vaginal vault. Bleeding likely due to drainage from surgery in setting of healing and decreased tissue swelling.  Less likely related to abscess and acute infection.    Plan  -bleeding likely drainage from cuff in setting of healing given has no signs or symptom of acute infection  - recommend drainage of Abscess  -recommend gyn/onc consult for further evaluation and coordination of care with [facility] (Dr. Brie Cerda [phone]) 75yo woman with hx DM2, DVT, Asthma, Ovarian cancer receiving chemo at [facility] s/p debulking surgery presenting to [facility] with vaginal spotting since procedure and vaginal bleeding and cramping for last 2 weeks. Found to have thick walled abscess within the hysterectomy bed measuring 12.5 x 5.8 x 8.0 cm  and Progression of omental carcinomatosis with increasing size of RIGHT omental implants and new left-sided omental implants on Abdominal CT.  Physical exam showed intact vaginal cuff, and minimal dark red blood in vaginal vault. Bleeding likely due to drainage from surgery in setting of healing and decreased tissue swelling.  Less likely related to abscess and acute infection.    Plan  -bleeding likely drainage from cuff in setting of healing given has no signs or symptom of acute infection  - recommend drainage of Abscess  -recommend gyn/onc consult for further evaluation and coordination of care with [facility] (Dr. Brie Cerda [phone])    Have contacted Dr Jamin Pablo to discuss care and consult.

## 2021-12-02 NOTE — ED STATDOCS - ATTENDING CONTRIBUTION TO CARE
I, Wilma Pelayo DO,  performed the initial face to face bedside interview with this patient regarding history of present illness, review of symptoms and relevant past medical, social and family history.  I completed an independent physical examination.  I was the initial provider who evaluated this patient. I have signed out the follow up of any pending tests (i.e. labs, radiological studies) to the ACP.  I have communicated the patient’s plan of care and disposition with the ACP.  The history, relevant review of systems, past medical and surgical history, medical decision making, and physical examination was documented by the scribe in my presence and I attest to the accuracy of the documentation.

## 2021-12-02 NOTE — CONSULT NOTE ADULT - SUBJECTIVE AND OBJECTIVE BOX
75yo Woman with pmhx significant for ovarian cancer currently undergoing chemo s/p de-bulking and hystorectomy    Patient is a 76y old  Female who presents with a chief complaint of     HPI:      REVIEW OF SYSTEMS:  CONSTITUTIONAL: No weakness, fevers or chills  EYES/ENT: No visual changes;  No vertigo or throat pain   NECK: No pain or stiffness  RESPIRATORY: No cough, wheezing, hemoptysis; No shortness of breath  CARDIOVASCULAR: No chest pain or palpitations  GASTROINTESTINAL: No abdominal or epigastric pain. No nausea, vomiting, or hematemesis; No diarrhea or constipation. No melena or hematochezia.  GENITOURINARY: No dysuria, frequency or hematuria  NEUROLOGICAL: No numbness or weakness  SKIN: No itching, burning, rashes, or lesions   All other review of systems is negative unless indicated above    MEDICATIONS  (STANDING):  piperacillin/tazobactam IVPB... 3.375 Gram(s) IV Intermittent once    Allergies: Betadine, tetracycline    Ovarian cancer    Asthma    HLD (hyperlipidemia)    DVT (deep venous thrombosis)    HTN (hypertension)    No significant past surgical history        OB/GYN HISTORY:   Menarchy           Cycle Length              Days Flow                                       Last Menstrual Period                                         G    P                                       Last Pap smear:   [id number]                                            FAMILY HISTORY:  Family history unobtainable due to patient&#x27;s condition        SOCIAL HISTORY:    Vital Signs Last 24 Hrs  T(C): 36.8 (02 Dec 2021 12:07), Max: 36.8 (02 Dec 2021 12:07)  T(F): 98.2 (02 Dec 2021 12:07), Max: 98.2 (02 Dec 2021 12:07)  HR: 67 (02 Dec 2021 12:07) (67 - 67)  BP: 134/69 (02 Dec 2021 12:07) (134/69 - 134/69)  BP(mean): 90 (02 Dec 2021 12:07) (90 - 90)  RR: 18 (02 Dec 2021 12:07) (18 - 18)  SpO2: 97% (02 Dec 2021 12:07) (97% - 97%)      PHYSICAL EXAM:  Constitutional: NAD, awake and alert, well-developed  HEENT: PERR, EOMI, Normal Hearing, MMM  Neck: Soft and supple, No LAD, No JVD  Respiratory: Breath sounds are clear bilaterally, No wheezing, rales or rhonchi  Cardiovascular: S1 and S2, regular rate and rhythm, no Murmurs, gallops or rubs  Gastrointestinal: Bowel Sounds present, soft, nontender, nondistended, no guarding, no rebound  Extremities: No peripheral edema  Vascular: 2+ peripheral pulses  Neurological: A/O x 3, no focal deficits  Musculoskeletal: 5/5 strength b/l upper and lower extremities  Skin: No rashes    LABS:  Pregnancy Test:                        11.1   7.76  )-----------( 393      ( 02 Dec 2021 13:02 )             35.7     12    141  |  108  |  13  ----------------------------<  116<H>  3.8   |  26  |  0.83    Ca    8.6      02 Dec 2021 13:02    TPro  6.9  /  Alb  2.4<L>  /  TBili  0.3  /  DBili  x   /  AST  17  /  ALT  12  /  AlkPhos  150<H>  1202    I&O's Detail    PT/INR - ( 02 Dec 2021 13:02 )   PT: 16.8 sec;   INR: 1.46 ratio         PTT - ( 02 Dec 2021 13:02 )  PTT:38.0 sec  Urinalysis Basic - ( 02 Dec 2021 13:02 )    Color: Yellow / Appearance: Slightly Turbid / S.015 / pH: x  Gluc: x / Ketone: Negative  / Bili: Negative / Urobili: 4 mg/dL   Blood: x / Protein: 30 mg/dL / Nitrite: Negative   Leuk Esterase: Moderate / RBC: 6-10 /HPF / WBC 26-50   Sq Epi: x / Non Sq Epi: Negative / Bacteria: Few        RADIOLOGY & ADDITIONAL STUDIES: 75yo Woman with pmhx significant for ovarian cancer currently undergoing chemo s/p de-bulking surgery presenting to [facility] with complaints of vaginal bleeding x2 weeks. patient states that on  [place] [facility] [place] her ovarian cancer and omental carcinomatosis required 4units transfusion. Patient states was having spotting after the procedure but she noticed the bleeding 2 weeks ago and visited [facility] but was told there was no bleeding on exam. Patient endorses that bleeding restarted last night. Patient states that she uses a fentanyl patch for herniated disc while chemo is ongoing as she gets back pain during her sessions. Last BM was  hard stool. Patient follows at [facility] with Dr. Tristan Cerda ([phone]) for her care. Endorses constipation and cramping but denies any fever or chills or diarrhea.     PMHX: Ovarian cancer, Asthma, HLD, DM2, DVT  Meds: tresiva 30units at bedtime, variable as she adjusts for evening Blood sugar levels, Fentanyl patch   Allergies: Betadine, tetracycline  pshx: De-bulking surgery  OB/GYN HISTORY:  patient postmenopausal, receiving chemo from [facility]                                      FAMILY HISTORY: unknown  SOCIAL HISTORY: unknown      REVIEW OF SYSTEMS:   All other review of systems is negative unless indicated above    Vital Signs Last 24 Hrs  T(C): 36.8 (02 Dec 2021 12:07), Max: 36.8 (02 Dec 2021 12:07)  T(F): 98.2 (02 Dec 2021 12:07), Max: 98.2 (02 Dec 2021 12:07)  HR: 67 (02 Dec 2021 12:07) (67 - 67)  BP: 134/69 (02 Dec 2021 12:07) (134/69 - 134/69)  BP(mean): 90 (02 Dec 2021 12:07) (90 - 90)  RR: 18 (02 Dec 2021 12:07) (18 - 18)  SpO2: 97% (02 Dec 2021 12:07) (97% - 97%)      PHYSICAL EXAM:  Constitutional: NAD, awake and alert and oriented, well-developed  Gastrointestinal: soft, nontender, nondistended, no guarding, no rebound, laparoscopic incisions well healed  GYN: speculum exam performed, cuff visualized and intact, minimal dark red blood noted in vaginal vault    LABS:                  11.1   7.76  )-----------( 393      ( 02 Dec 2021 13:02 )             35.7     12-    141  |  108  |  13  ----------------------------<  116<H>  3.8   |  26  |  0.83    Ca    8.6      02 Dec 2021 13:02    TPro  6.9  /  Alb  2.4<L>  /  TBili  0.3  /  DBili  x   /  AST  17  /  ALT  12  /  AlkPhos  150<H>  12    I&O's Detail    PT/INR - ( 02 Dec 2021 13:02 )   PT: 16.8 sec;   INR: 1.46 ratio      PTT - ( 02 Dec 2021 13:02 )  PTT:38.0 sec  Urinalysis Basic - ( 02 Dec 2021 13:02 )    Color: Yellow / Appearance: Slightly Turbid / S.015 / pH: x  Gluc: x / Ketone: Negative  / Bili: Negative / Urobili: 4 mg/dL   Blood: x / Protein: 30 mg/dL / Nitrite: Negative   Leuk Esterase: Moderate / RBC: 6-10 /HPF / WBC 26-50   Sq Epi: x / Non Sq Epi: Negative / Bacteria: Few    RADIOLOGY & ADDITIONAL STUDIES:  FINDINGS:  LOWER CHEST: Within normal limits.    LIVER: Within normal limits.  BILE DUCTS: Normal caliber.  GALLBLADDER: Cholecystectomy.  SPLEEN: Within normal limits.  PANCREAS: Within normal limits.  ADRENALS: Within normal limits.  KIDNEYS/URETERS: Vascular calcification involving the RIGHT renal artery. No hydronephrosis or hydroureter.    BLADDER: The bladder is incompletely distended and thick-walled.  REPRODUCTIVE ORGANS: Status post hysterectomy. Within the hysterectomy bed, there is a thick-walled abscess measuring 12.5 x 5.8 x 8.0 cm.    BOWEL: Moderate amount of stool throughout the colon. Appendix is not visualized. No evidence of inflammation in the pericecal region.  PERITONEUM: There are multiple nodular soft tissue implants within the greater omentum on the RIGHT. These have increased slightly in size since the prior study. There are new left-sided peritoneal implants (2-39 and 2-43) and RIGHT lower quadrant peritoneal implants (602-34).  VESSELS: Atherosclerotic changes.  RETROPERITONEUM/LYMPH NODES: No lymphadenopathy.  ABDOMINAL WALL: Within normal limits.  BONES: Within normal limits.    IMPRESSION:  1.  Status post hysterectomy with thick walled abscess within the hysterectomybed measuring 12.5 x 5.8 x 8.0 cm.  2.  Progression of omental carcinomatosis with increasing size of RIGHT omental implants and new left-sided omental implants.

## 2021-12-02 NOTE — ED STATDOCS - PROGRESS NOTE DETAILS
76 yr. old female presents to ED with vaginal bleeding after surgery on Nov. 2nd. Reports had vaginal spotting but now bright red blood with vaginal bleeding. No fever or chills. Normal BM, no blood in stools . Had lab work Monday and back to normal. Seen and examined by attending in Intake. Plan: IV,labs, and Consult with OB/GYN team. Gerardo ARIAS Consulted Dr. Smith for evaluation of abdominal abscess S/P hysterectomy. MTangredi NP Seen and evaluated by Dr. Smith in ED. MTayosi NP Pierce SMALL: spoke with ED physician at [facility]- accepts transfer to [facility]- patient's surgery was there 1 month ago and patient with relationship with physicians there; patient is agreeable to plan at this time.

## 2021-12-03 LAB
CULTURE RESULTS: SIGNIFICANT CHANGE UP
SPECIMEN SOURCE: SIGNIFICANT CHANGE UP

## 2021-12-13 NOTE — ED ADULT NURSE NOTE - NS ED NURSE LEVEL OF CONSCIOUSNESS SPEECH
Pt with history of Afib, on multaq, lopressor  No AC due to prior GIB/ anemia; only on ASA  EKG rate controlled and paced  c/w ,multaq and lopressor Speaking Coherently

## 2021-12-27 DIAGNOSIS — J45.909 UNSPECIFIED ASTHMA, UNCOMPLICATED: ICD-10-CM

## 2021-12-27 DIAGNOSIS — J30.9 ALLERGIC RHINITIS, UNSPECIFIED: ICD-10-CM

## 2021-12-27 RX ORDER — FLUTICASONE PROPIONATE 50 UG/1
50 SPRAY, METERED NASAL DAILY
Qty: 1 | Refills: 5 | Status: ACTIVE | COMMUNITY
Start: 2021-12-27 | End: 1900-01-01

## 2021-12-27 RX ORDER — ALBUTEROL SULFATE 90 UG/1
108 (90 BASE) INHALANT RESPIRATORY (INHALATION)
Qty: 1 | Refills: 5 | Status: ACTIVE | COMMUNITY
Start: 2021-12-27 | End: 1900-01-01

## 2022-02-03 RX ORDER — GABAPENTIN 300 MG/1
300 CAPSULE ORAL 3 TIMES DAILY
Qty: 90 | Refills: 2 | Status: ACTIVE | COMMUNITY
Start: 2021-07-20 | End: 1900-01-01

## 2022-02-03 RX ORDER — LOSARTAN POTASSIUM 50 MG/1
50 TABLET, FILM COATED ORAL DAILY
Qty: 90 | Refills: 1 | Status: ACTIVE | COMMUNITY
Start: 2021-07-20 | End: 1900-01-01

## 2022-02-03 RX ORDER — DULOXETINE HYDROCHLORIDE 30 MG/1
30 CAPSULE, DELAYED RELEASE PELLETS ORAL
Qty: 90 | Refills: 1 | Status: ACTIVE | COMMUNITY
Start: 2021-07-20 | End: 1900-01-01

## 2022-02-04 ENCOUNTER — RX RENEWAL (OUTPATIENT)
Age: 77
End: 2022-02-04

## 2022-02-07 RX ORDER — RIVAROXABAN 20 MG/1
20 TABLET, FILM COATED ORAL
Qty: 90 | Refills: 1 | Status: DISCONTINUED | COMMUNITY
End: 2022-02-07

## 2022-02-07 RX ORDER — ATORVASTATIN CALCIUM 40 MG/1
40 TABLET, FILM COATED ORAL
Qty: 90 | Refills: 1 | Status: ACTIVE | COMMUNITY

## 2022-02-07 RX ORDER — RIVAROXABAN 20 MG/1
20 TABLET, FILM COATED ORAL
Qty: 90 | Refills: 1 | Status: ACTIVE | COMMUNITY
Start: 2022-02-07 | End: 1900-01-01

## 2022-03-28 NOTE — DISCHARGE NOTE PROVIDER - CARE PROVIDER_API CALL
missing teeth
Kirby Diez)  [facility]  [address], [address]  [place] [address]  Phone: [phone]  Fax: [phone]  Follow Up Time: 1 week    See Dr MONCADA,   Phone: (   )    -  Fax: (   )    -  Follow Up Time: 1-3 days

## 2023-01-09 NOTE — ED PROVIDER NOTE - NS ED MD EM SELECTION
Suma Mark,    It is my pleasure to see you today at the [facility].    My recommendations for this visit are:    Reduce irbesartan from 300 mg daily to 150 mg daily  Start Imdur 30 mg daily for chest pressure  Cardiac event monitor for 7 days  ECHO   Routine lab tests CBC and BMP  Refer you to [facility] device team  Will see you again 2 months      Jacek Worley MD, PhD   
[id number] [facility]

## 2023-03-20 ENCOUNTER — CLAIMS CREATED FROM THE CLAIM WINDOW (OUTPATIENT)
Dept: URBAN - METROPOLITAN AREA CLINIC 72 | Facility: CLINIC | Age: 78
End: 2023-03-20
Payer: MEDICARE

## 2023-03-20 VITALS
DIASTOLIC BLOOD PRESSURE: 69 MMHG | SYSTOLIC BLOOD PRESSURE: 147 MMHG | WEIGHT: 238 LBS | TEMPERATURE: 97.8 F | BODY MASS INDEX: 36.07 KG/M2 | HEART RATE: 89 BPM | HEIGHT: 68 IN

## 2023-03-20 DIAGNOSIS — K59.09 CHANGE IN BOWEL MOVEMENTS INTERMITTENT CONSTIPATION. URGENCY IN THE MORNING.: ICD-10-CM

## 2023-03-20 DIAGNOSIS — K31.819 GAVE (GASTRIC ANTRAL VASCULAR ECTASIA): ICD-10-CM

## 2023-03-20 DIAGNOSIS — D50.8 ACQUIRED IRON DEFICIENCY ANEMIA DUE TO DECREASED ABSORPTION: ICD-10-CM

## 2023-03-20 PROBLEM — 35298007: Status: ACTIVE | Noted: 2023-03-20

## 2023-03-20 PROBLEM — 724556004: Status: ACTIVE | Noted: 2023-03-20

## 2023-03-20 PROCEDURE — 99214 OFFICE O/P EST MOD 30 MIN: CPT | Performed by: NURSE PRACTITIONER

## 2023-03-20 RX ORDER — ATORVASTATIN CALCIUM 40 MG/1
1 TABLET TABLET, FILM COATED ORAL ONCE A DAY
Status: ACTIVE | COMMUNITY

## 2023-03-20 RX ORDER — NIFEDIPINE 60 MG/1
TAKE ONE TABLET BY MOUTH ONE TIME DAILY TABLET, FILM COATED, EXTENDED RELEASE ORAL
Qty: 90 | Refills: 0 | Status: ON HOLD | COMMUNITY
Start: 2020-02-06

## 2023-03-20 RX ORDER — INSULIN DETEMIR 100 [IU]/ML
INJECTION, SOLUTION SUBCUTANEOUS
Qty: 15 | Refills: 0 | Status: ON HOLD | COMMUNITY
Start: 2018-10-04

## 2023-03-20 RX ORDER — TELMISARTAN AND HYDROCHLOROTHIAZIDE 25; 80 MG/1; MG/1
TAKE 1 TABLET DAILY TABLET ORAL
Refills: 0 | Status: ACTIVE | COMMUNITY
Start: 2019-03-12

## 2023-03-20 RX ORDER — METHYLPREDNISOLONE 4 MG/1
TABLET ORAL
Qty: 21 | Refills: 0 | Status: ON HOLD | COMMUNITY
Start: 2019-10-01

## 2023-03-20 RX ORDER — LEVOTHYROXINE SODIUM 0.1 MG/1
TAKE 1 TABLET DAILY AS DIRECTED TABLET ORAL
Refills: 0 | Status: ON HOLD | COMMUNITY
Start: 2019-01-30

## 2023-03-20 RX ORDER — NYSTATIN AND TRIAMCINOLONE ACETONIDE 100000; 1 MG/G; MG/G
CREAM TOPICAL
Qty: 15 | Refills: 0 | Status: ON HOLD | COMMUNITY
Start: 2018-10-09

## 2023-03-20 RX ORDER — GABAPENTIN 300 MG/1
1 CAPSULE CAPSULE ORAL ONCE A DAY
Status: ACTIVE | COMMUNITY

## 2023-03-20 RX ORDER — FERROUS SULFATE 325(65) MG
1 TABLET TABLET ORAL ONCE A DAY
Status: ACTIVE | COMMUNITY

## 2023-03-20 RX ORDER — SUCRALFATE 1 G/1
1 TABLET ON AN EMPTY STOMACH TABLET ORAL TWICE A DAY
Status: ACTIVE | COMMUNITY

## 2023-03-20 RX ORDER — ALBUTEROL SULFATE 90 UG/1
AEROSOL, METERED RESPIRATORY (INHALATION)
Qty: 8 | Refills: 0 | Status: ACTIVE | COMMUNITY
Start: 2018-10-03

## 2023-03-20 RX ORDER — AMITRIPTYLINE HYDROCHLORIDE 25 MG/1
TABLET, FILM COATED ORAL
Qty: 30 | Refills: 0 | Status: ON HOLD | COMMUNITY
Start: 2019-09-26

## 2023-03-20 RX ORDER — ASPIRIN 81 MG
TABLET, DELAYED RELEASE (ENTERIC COATED) ORAL
Refills: 0 | Status: ACTIVE | COMMUNITY

## 2023-03-20 RX ORDER — DIAZEPAM 5 MG/1
TAKE ONE TABLET BY MOUTH 30 MINUTES PRIOR TO PROCEDURE TABLET ORAL
Qty: 1 | Refills: 0 | Status: ON HOLD | COMMUNITY
Start: 2019-09-10

## 2023-03-20 RX ORDER — OXYCODONE HYDROCHLORIDE 5 MG/1
TAKE ONE TABLET BY MOUTH EVERY 4 TO 6 HOURS AS NEEDED FOR SEVERE PAIN TABLET ORAL
Qty: 7 | Refills: 0 | Status: ON HOLD | COMMUNITY
Start: 2019-09-13

## 2023-03-20 RX ORDER — INSULIN DEGLUDEC INJECTION 100 U/ML
AS DIRECTED INJECTION, SOLUTION SUBCUTANEOUS
Status: ACTIVE | COMMUNITY

## 2023-03-20 RX ORDER — ATORVASTATIN CALCIUM 20 MG/1
TABLET, FILM COATED ORAL
Qty: 90 | Refills: 0 | Status: ON HOLD | COMMUNITY
Start: 2018-12-08

## 2023-03-20 RX ORDER — DULOXETINE HYDROCHLORIDE 60 MG/1
1 CAPSULE CAPSULE, DELAYED RELEASE ORAL ONCE A DAY
Status: ACTIVE | COMMUNITY

## 2023-03-20 RX ORDER — PREDNISONE 20 MG/1
TABLET ORAL
Qty: 10 | Refills: 0 | Status: ON HOLD | COMMUNITY
Start: 2018-10-03

## 2023-03-20 RX ORDER — DOCUSATE SODIUM 100 MG/1
TAKE ONE CAPSULE BY MOUTH THREE TIMES A DAY CAPSULE, LIQUID FILLED ORAL
Qty: 30 | Refills: 0 | Status: ON HOLD | COMMUNITY
Start: 2019-06-21

## 2023-03-20 RX ORDER — RIVAROXABAN 20 MG/1
1 TABLET WITH FOOD TABLET, FILM COATED ORAL ONCE A DAY
Status: ACTIVE | COMMUNITY

## 2023-03-20 RX ORDER — INSULIN DEGLUDEC INJECTION 100 U/ML
AS DIRECTED INJECTION, SOLUTION SUBCUTANEOUS
Status: ON HOLD | COMMUNITY

## 2023-03-20 RX ORDER — ONDANSETRON 4 MG/1
TABLET, ORALLY DISINTEGRATING ORAL
Qty: 20 | Refills: 0 | Status: ON HOLD | COMMUNITY
Start: 2019-06-21

## 2023-03-20 RX ORDER — ALENDRONATE SODIUM 70 MG/1
TAKE ONE TABLET BY MOUTH EVERY WEEK TABLET ORAL
Qty: 12 | Refills: 0 | Status: ON HOLD | COMMUNITY
Start: 2020-02-06

## 2023-03-20 RX ORDER — AMOXICILLIN 875 MG/1
TABLET, FILM COATED ORAL
Qty: 14 | Refills: 0 | Status: ON HOLD | COMMUNITY
Start: 2018-10-03

## 2023-03-20 RX ORDER — HYDROCODONE BITARTRATE AND ACETAMINOPHEN 5; 325 MG/1; MG/1
TABLET ORAL
Qty: 24 | Refills: 0 | Status: ON HOLD | COMMUNITY
Start: 2019-06-21

## 2023-03-20 RX ORDER — PANTOPRAZOLE SODIUM 40 MG/1
1 TABLET TABLET, DELAYED RELEASE ORAL ONCE A DAY
Status: ACTIVE | COMMUNITY

## 2023-03-20 RX ORDER — FAMOTIDINE 40 MG/1
TABLET ORAL
Qty: 180 | Refills: 0 | Status: ACTIVE | COMMUNITY
Start: 2019-06-05

## 2023-03-20 RX ORDER — FLUCONAZOLE 100 MG/1
TABLET ORAL
Qty: 8 | Refills: 0 | Status: ON HOLD | COMMUNITY
Start: 2018-10-09

## 2023-03-20 RX ORDER — SUCRALFATE 1 G/1
TABLET ORAL
Qty: 120 | Refills: 0 | Status: ON HOLD | COMMUNITY
Start: 2019-08-30

## 2023-03-20 RX ORDER — EMPAGLIFLOZIN AND METFORMIN HYDROCHLORIDE 12.5; 1 MG/1; MG/1
TAKE TWO TABLETS BY MOUTH ONE TIME DAILY TABLET ORAL
Qty: 60 | Refills: 0 | Status: ON HOLD | COMMUNITY
Start: 2018-11-06

## 2023-03-20 RX ORDER — CALCIUM CARBONATE/VITAMIN D3 600 MG-20
TAKE 1 TABLET DAILY TABLET ORAL
Refills: 0 | Status: ACTIVE | COMMUNITY

## 2023-03-20 RX ORDER — EMPAGLIFLOZIN, METFORMIN HYDROCHLORIDE 12.5; 1 MG/1; MG/1
TABLET, EXTENDED RELEASE ORAL
Refills: 0 | Status: ON HOLD | COMMUNITY
Start: 2019-10-02

## 2023-03-20 RX ORDER — LEVOTHYROXINE SODIUM 112 UG/1
1 TABLET IN THE MORNING ON AN EMPTY STOMACH TABLET ORAL ONCE A DAY
Status: ACTIVE | COMMUNITY

## 2023-03-20 RX ORDER — NIFEDIPINE 60 MG/1
TAKE 1 TABLET DAILY TABLET, EXTENDED RELEASE ORAL
Refills: 0 | Status: ON HOLD | COMMUNITY
Start: 2018-12-01

## 2023-03-20 NOTE — HPI-TODAY'S VISIT:
Ms. Mcclure is a 77-year-old female referred by Dr. Wang, gynecology oncology.  Recently diagnosed with GAVE.    History of high-grade serous ovarian carcinoma.  Negative for BRCA.  History of DVT on Xarelto.  Ongoing chemotherapy induced neuropathy.    Last seen 3/10/2021 for follow-up appointment for dyspepsia and fecal smearing.  Pepcid improved her symptoms.  Fiber recommended for fecal smearing which was felt possibly related to external skin tags.  Bidet recommended and wet wipes.  CT, abdomen and pelvis with contrast 1/27/2023.  When compared to 10/28/2022.  2 cm right renal cyst unchanged.  Pelvic soft tissue masses similar to prior study multiple soft tissue nodules noted throughout the peritoneal cavity similar to prior exam.  Largest measures 6.1 x 2.6 cm mid right abdomen described as omental mass.  Previously 5.5 x 3.1 cm.   multiple smaller masses which are stable.  Colonic diverticulosis.  No enlarged retroperitoneal or pelvic sidewall lymph nodes.  Small fat-containing umbilical hernia.  Status postcholecystectomy.   EGD which showed active gastritis, she was placed on Pepcid and Carafate for this with minimal improvement she ultimately stop the Carafate and weaned off the Pepcid.  Pt went to [facility] ER due to a fall 3/3/23 and was discharged 3/7/23. She fell due to weakness. They then discovered she was bleeding internally so they did EGD on 3/6 by Dr. Thomas which showed she had GAVE. States she saha an ablation.  Had 2 units PRBCs transfused.  On interview today she denies pain. Pt had a couple bouts of nausea and vomiting since.  She is on iron and is going to start iron infusion with Dr. Wang.  She has had some constipation.  She states she was rushing home to use the restroom to have a BM when the recent vomiting episodes occurred.  Last vomited last week, no hematemesis.  Takes colace daily.  She gets loose stool after the 4th day of colace.  Had dark stool initially prior to her hospitalization but none since her procedure. No hematochezia.  She runs out of her carafate tomorrow.    Labs 3/5/2023.  Iron normal at 41 with ferritin low at 8.7, iron sat 10% and normal transferrin.  LFTs normal.  CRP normal.  BNP normal. D-dimer 0.53.  Hemoglobin A1c 7.1. Labs 3/7/2023.  CBC the 8.1, RBC 2.7, hematocrit 24.4, MCV 90.5, RDW 19.3 normal platelets.  PT 18.1, INR 1.53  Last colonoscopy was 5 years ago. No polyps.

## 2023-03-21 PROBLEM — 711150003: Status: ACTIVE | Noted: 2023-03-21

## 2023-03-23 ENCOUNTER — TELEPHONE ENCOUNTER (OUTPATIENT)
Dept: URBAN - METROPOLITAN AREA CLINIC 72 | Facility: CLINIC | Age: 78
End: 2023-03-23

## 2023-04-17 ENCOUNTER — TELEPHONE ENCOUNTER (OUTPATIENT)
Dept: URBAN - METROPOLITAN AREA CLINIC 72 | Facility: CLINIC | Age: 78
End: 2023-04-17

## 2023-04-25 ENCOUNTER — OFFICE VISIT (OUTPATIENT)
Dept: URBAN - METROPOLITAN AREA CLINIC 72 | Facility: CLINIC | Age: 78
End: 2023-04-25
Payer: MEDICARE

## 2023-04-25 VITALS
DIASTOLIC BLOOD PRESSURE: 76 MMHG | WEIGHT: 232 LBS | TEMPERATURE: 97.1 F | SYSTOLIC BLOOD PRESSURE: 155 MMHG | HEIGHT: 68 IN | BODY MASS INDEX: 35.16 KG/M2 | HEART RATE: 83 BPM

## 2023-04-25 DIAGNOSIS — R11.14 BILIOUS VOMITING WITHOUT NAUSEA: ICD-10-CM

## 2023-04-25 DIAGNOSIS — K59.01 SLOW TRANSIT CONSTIPATION: ICD-10-CM

## 2023-04-25 DIAGNOSIS — K31.819 GAVE (GASTRIC ANTRAL VASCULAR ECTASIA): ICD-10-CM

## 2023-04-25 DIAGNOSIS — R10.13 DYSPEPSIA: ICD-10-CM

## 2023-04-25 PROCEDURE — 99214 OFFICE O/P EST MOD 30 MIN: CPT | Performed by: INTERNAL MEDICINE

## 2023-04-25 RX ORDER — DIAZEPAM 5 MG/1
TAKE ONE TABLET BY MOUTH 30 MINUTES PRIOR TO PROCEDURE TABLET ORAL
Qty: 1 | Refills: 0 | Status: ON HOLD | COMMUNITY
Start: 2019-09-10

## 2023-04-25 RX ORDER — RIVAROXABAN 20 MG/1
1 TABLET WITH FOOD TABLET, FILM COATED ORAL ONCE A DAY
Status: ACTIVE | COMMUNITY

## 2023-04-25 RX ORDER — GABAPENTIN 300 MG/1
1 CAPSULE CAPSULE ORAL ONCE A DAY
Status: ACTIVE | COMMUNITY

## 2023-04-25 RX ORDER — EMPAGLIFLOZIN, METFORMIN HYDROCHLORIDE 12.5; 1 MG/1; MG/1
TABLET, EXTENDED RELEASE ORAL
Refills: 0 | Status: ON HOLD | COMMUNITY
Start: 2019-10-02

## 2023-04-25 RX ORDER — HYDROCHLOROTHIAZIDE 25 MG/1
1 TABLET IN THE MORNING TABLET ORAL ONCE A DAY
Status: ACTIVE | COMMUNITY

## 2023-04-25 RX ORDER — HYDROCODONE BITARTRATE AND ACETAMINOPHEN 5; 325 MG/1; MG/1
TABLET ORAL
Qty: 24 | Refills: 0 | Status: ON HOLD | COMMUNITY
Start: 2019-06-21

## 2023-04-25 RX ORDER — DULOXETINE HYDROCHLORIDE 60 MG/1
1 CAPSULE CAPSULE, DELAYED RELEASE ORAL ONCE A DAY
Status: ACTIVE | COMMUNITY

## 2023-04-25 RX ORDER — METHYLPREDNISOLONE 4 MG/1
TABLET ORAL
Qty: 21 | Refills: 0 | Status: ON HOLD | COMMUNITY
Start: 2019-10-01

## 2023-04-25 RX ORDER — SUCRALFATE 1 G/1
TABLET ORAL
Qty: 120 | Refills: 0 | Status: ON HOLD | COMMUNITY
Start: 2019-08-30

## 2023-04-25 RX ORDER — NIFEDIPINE 60 MG/1
TAKE 1 TABLET DAILY TABLET, EXTENDED RELEASE ORAL
Refills: 0 | Status: ON HOLD | COMMUNITY
Start: 2018-12-01

## 2023-04-25 RX ORDER — FAMOTIDINE 40 MG/1
TABLET ORAL
Qty: 180 | Refills: 0 | Status: ACTIVE | COMMUNITY
Start: 2019-06-05

## 2023-04-25 RX ORDER — ONDANSETRON 4 MG/1
TABLET, ORALLY DISINTEGRATING ORAL
Qty: 20 | Refills: 0 | Status: ON HOLD | COMMUNITY
Start: 2019-06-21

## 2023-04-25 RX ORDER — LEVOTHYROXINE SODIUM 0.1 MG/1
TAKE 1 TABLET DAILY AS DIRECTED TABLET ORAL
Refills: 0 | Status: ON HOLD | COMMUNITY
Start: 2019-01-30

## 2023-04-25 RX ORDER — TELMISARTAN 80 MG/1
1 TABLET TABLET ORAL ONCE A DAY
Status: ACTIVE | COMMUNITY

## 2023-04-25 RX ORDER — INSULIN DEGLUDEC INJECTION 100 U/ML
AS DIRECTED INJECTION, SOLUTION SUBCUTANEOUS
Status: ON HOLD | COMMUNITY

## 2023-04-25 RX ORDER — LEVOTHYROXINE SODIUM 112 UG/1
1 TABLET IN THE MORNING ON AN EMPTY STOMACH TABLET ORAL ONCE A DAY
Status: ACTIVE | COMMUNITY

## 2023-04-25 RX ORDER — ALENDRONATE SODIUM 70 MG/1
TAKE ONE TABLET BY MOUTH EVERY WEEK TABLET ORAL
Qty: 12 | Refills: 0 | Status: ON HOLD | COMMUNITY
Start: 2020-02-06

## 2023-04-25 RX ORDER — AMITRIPTYLINE HYDROCHLORIDE 25 MG/1
TABLET, FILM COATED ORAL
Qty: 30 | Refills: 0 | Status: ON HOLD | COMMUNITY
Start: 2019-09-26

## 2023-04-25 RX ORDER — ATORVASTATIN CALCIUM 20 MG/1
TABLET, FILM COATED ORAL
Qty: 90 | Refills: 0 | Status: ON HOLD | COMMUNITY
Start: 2018-12-08

## 2023-04-25 RX ORDER — AMOXICILLIN 875 MG/1
TABLET, FILM COATED ORAL
Qty: 14 | Refills: 0 | Status: ON HOLD | COMMUNITY
Start: 2018-10-03

## 2023-04-25 RX ORDER — DOCUSATE SODIUM 100 MG/1
TAKE ONE CAPSULE BY MOUTH THREE TIMES A DAY CAPSULE, LIQUID FILLED ORAL
Qty: 30 | Refills: 0 | Status: ON HOLD | COMMUNITY
Start: 2019-06-21

## 2023-04-25 RX ORDER — INSULIN DETEMIR 100 [IU]/ML
INJECTION, SOLUTION SUBCUTANEOUS
Qty: 15 | Refills: 0 | Status: ON HOLD | COMMUNITY
Start: 2018-10-04

## 2023-04-25 RX ORDER — PANTOPRAZOLE SODIUM 40 MG/1
1 TABLET TABLET, DELAYED RELEASE ORAL ONCE A DAY
Status: ACTIVE | COMMUNITY

## 2023-04-25 RX ORDER — FLUCONAZOLE 100 MG/1
TABLET ORAL
Qty: 8 | Refills: 0 | Status: ON HOLD | COMMUNITY
Start: 2018-10-09

## 2023-04-25 RX ORDER — SIMETHICONE 80 MG/1
1 TABLET AFTER MEALS AND AT BEDTIME AS NEEDED TABLET, CHEWABLE ORAL
Status: ACTIVE | COMMUNITY

## 2023-04-25 RX ORDER — ALBUTEROL SULFATE 90 UG/1
AEROSOL, METERED RESPIRATORY (INHALATION)
Qty: 8 | Refills: 0 | Status: ACTIVE | COMMUNITY
Start: 2018-10-03

## 2023-04-25 RX ORDER — FERROUS SULFATE 325(65) MG
1 TABLET TABLET ORAL ONCE A DAY
Status: DISCONTINUED | COMMUNITY

## 2023-04-25 RX ORDER — OXYCODONE HYDROCHLORIDE 5 MG/1
TAKE ONE TABLET BY MOUTH EVERY 4 TO 6 HOURS AS NEEDED FOR SEVERE PAIN TABLET ORAL
Qty: 7 | Refills: 0 | Status: ON HOLD | COMMUNITY
Start: 2019-09-13

## 2023-04-25 RX ORDER — ASPIRIN 81 MG
TABLET, DELAYED RELEASE (ENTERIC COATED) ORAL
Refills: 0 | Status: ACTIVE | COMMUNITY

## 2023-04-25 RX ORDER — SUCRALFATE 1 G/1
1 TABLET ON AN EMPTY STOMACH TABLET ORAL TWICE A DAY
Status: DISCONTINUED | COMMUNITY

## 2023-04-25 RX ORDER — INSULIN DEGLUDEC INJECTION 100 U/ML
AS DIRECTED INJECTION, SOLUTION SUBCUTANEOUS
Status: ACTIVE | COMMUNITY

## 2023-04-25 RX ORDER — ATORVASTATIN CALCIUM 40 MG/1
1 TABLET TABLET, FILM COATED ORAL ONCE A DAY
Status: ACTIVE | COMMUNITY

## 2023-04-25 RX ORDER — CALCIUM CARBONATE/VITAMIN D3 600 MG-20
TAKE 1 TABLET DAILY TABLET ORAL
Refills: 0 | Status: DISCONTINUED | COMMUNITY

## 2023-04-25 RX ORDER — NYSTATIN AND TRIAMCINOLONE ACETONIDE 100000; 1 MG/G; MG/G
CREAM TOPICAL
Qty: 15 | Refills: 0 | Status: ON HOLD | COMMUNITY
Start: 2018-10-09

## 2023-04-25 RX ORDER — PREDNISONE 20 MG/1
TABLET ORAL
Qty: 10 | Refills: 0 | Status: ON HOLD | COMMUNITY
Start: 2018-10-03

## 2023-04-25 RX ORDER — EMPAGLIFLOZIN AND METFORMIN HYDROCHLORIDE 12.5; 1 MG/1; MG/1
TAKE TWO TABLETS BY MOUTH ONE TIME DAILY TABLET ORAL
Qty: 60 | Refills: 0 | Status: ON HOLD | COMMUNITY
Start: 2018-11-06

## 2023-04-25 RX ORDER — NIFEDIPINE 60 MG/1
TAKE ONE TABLET BY MOUTH ONE TIME DAILY TABLET, FILM COATED, EXTENDED RELEASE ORAL
Qty: 90 | Refills: 0 | Status: ON HOLD | COMMUNITY
Start: 2020-02-06

## 2023-04-25 NOTE — HPI-TODAY'S VISIT:
Mrs. Mcclure returns for follow-up, she is a 78-year-old female last seen in office on 3/22/2023.  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  Has neuropathy secondary to chemotherapy and a history of DVT maintained on Xarelto.  She had a history of dyspepsia and fecal smearing had been seen by us in the past was recommended to use fiber titrated to effect per day and wipes as needed.  She had a CT scan of the abdomen pelvis with contrast in January 2023 showed 2 cm right renal cyst unchanged pelvic soft tissue mass unchanged with nodule throughout the peritoneal cavity and a 6 x 1 x 2.6 right abdomen omental mass previously 5.5 x 3.1 small masses which are stable, diverticulosis no enlarged lymph nodes and a small fat-containing hernia.  She is status post cholecystectomy.  Went to [facility] emergency department after a fall in March 2023 was admitted found to be anemic had an EGD by physician there that showed GAVE underwent ablation and she received blood transfusions.  Since that time she reported multiple bouts of nausea and vomiting.  She is on IV iron infusion developed constipation with oral iron.  Reports having to go home to use the bathroom and having firm bowel movements and vomiting associated with this no hematemesis.  She was on stool softener with minimal improvement.  In review of records from [place] on 3/6/2023 due to heme positive stool and anemia had GAVE with a gastric polyp.  There is no esophageal stricture or abnormal appearance of the esophagus, the cardia of the stomach had an 8 mm ulcerated sessile polypoid lesion which was biopsied the antrum had fresh blood and thickened fold APC was used to ablate the gave and no further bleeding was identified.  Pathology from the gastric polyp revealed crowded glandular proliferation with atypia indefinite for glandular dysplasia no intestinal metaplasia or glandular dysplasia distinctly identified.    A repeat endoscopy is planned in 3 months.  She reports that she has had 6 episodes since February where she has vomiting of bilious products, it only occurs seemingly when she needs to have a bowel movement and she will hold her bowels to get to a private bathroom and will have vomiting.  She does not see food product of blood in her vomit.  This can occur when she has both loose stools and constipation but again is infrequent and only happens related to her need for BM.  She has no abdominal pain.  No reflux.

## 2023-05-09 ENCOUNTER — TELEPHONE ENCOUNTER (OUTPATIENT)
Dept: URBAN - METROPOLITAN AREA CLINIC 72 | Facility: CLINIC | Age: 78
End: 2023-05-09

## 2023-05-12 ENCOUNTER — OFFICE VISIT (OUTPATIENT)
Dept: URBAN - METROPOLITAN AREA MEDICAL CENTER 40 | Facility: MEDICAL CENTER | Age: 78
End: 2023-05-12

## 2023-05-12 RX ORDER — PANTOPRAZOLE SODIUM 40 MG/1
1 TABLET TABLET, DELAYED RELEASE ORAL ONCE A DAY
Status: ACTIVE | COMMUNITY

## 2023-05-12 RX ORDER — PREDNISONE 20 MG/1
TABLET ORAL
Qty: 10 | Refills: 0 | Status: ON HOLD | COMMUNITY
Start: 2018-10-03

## 2023-05-12 RX ORDER — ASPIRIN 81 MG
TABLET, DELAYED RELEASE (ENTERIC COATED) ORAL
Refills: 0 | Status: ACTIVE | COMMUNITY

## 2023-05-12 RX ORDER — NIFEDIPINE 60 MG/1
TAKE 1 TABLET DAILY TABLET, EXTENDED RELEASE ORAL
Refills: 0 | Status: ON HOLD | COMMUNITY
Start: 2018-12-01

## 2023-05-12 RX ORDER — AMOXICILLIN 875 MG/1
TABLET, FILM COATED ORAL
Qty: 14 | Refills: 0 | Status: ON HOLD | COMMUNITY
Start: 2018-10-03

## 2023-05-12 RX ORDER — INSULIN DETEMIR 100 [IU]/ML
INJECTION, SOLUTION SUBCUTANEOUS
Qty: 15 | Refills: 0 | Status: ON HOLD | COMMUNITY
Start: 2018-10-04

## 2023-05-12 RX ORDER — INSULIN DEGLUDEC INJECTION 100 U/ML
AS DIRECTED INJECTION, SOLUTION SUBCUTANEOUS
Status: ACTIVE | COMMUNITY

## 2023-05-12 RX ORDER — OXYCODONE HYDROCHLORIDE 5 MG/1
TAKE ONE TABLET BY MOUTH EVERY 4 TO 6 HOURS AS NEEDED FOR SEVERE PAIN TABLET ORAL
Qty: 7 | Refills: 0 | Status: ON HOLD | COMMUNITY
Start: 2019-09-13

## 2023-05-12 RX ORDER — ATORVASTATIN CALCIUM 40 MG/1
1 TABLET TABLET, FILM COATED ORAL ONCE A DAY
Status: ACTIVE | COMMUNITY

## 2023-05-12 RX ORDER — LEVOTHYROXINE SODIUM 112 UG/1
1 TABLET IN THE MORNING ON AN EMPTY STOMACH TABLET ORAL ONCE A DAY
Status: ACTIVE | COMMUNITY

## 2023-05-12 RX ORDER — LEVOTHYROXINE SODIUM 0.1 MG/1
TAKE 1 TABLET DAILY AS DIRECTED TABLET ORAL
Refills: 0 | Status: ON HOLD | COMMUNITY
Start: 2019-01-30

## 2023-05-12 RX ORDER — AMITRIPTYLINE HYDROCHLORIDE 25 MG/1
TABLET, FILM COATED ORAL
Qty: 30 | Refills: 0 | Status: ON HOLD | COMMUNITY
Start: 2019-09-26

## 2023-05-12 RX ORDER — DOCUSATE SODIUM 100 MG/1
TAKE ONE CAPSULE BY MOUTH THREE TIMES A DAY CAPSULE, LIQUID FILLED ORAL
Qty: 30 | Refills: 0 | Status: ON HOLD | COMMUNITY
Start: 2019-06-21

## 2023-05-12 RX ORDER — FAMOTIDINE 40 MG/1
TABLET ORAL
Qty: 180 | Refills: 0 | Status: ACTIVE | COMMUNITY
Start: 2019-06-05

## 2023-05-12 RX ORDER — EMPAGLIFLOZIN, METFORMIN HYDROCHLORIDE 12.5; 1 MG/1; MG/1
TABLET, EXTENDED RELEASE ORAL
Refills: 0 | Status: ON HOLD | COMMUNITY
Start: 2019-10-02

## 2023-05-12 RX ORDER — ALENDRONATE SODIUM 70 MG/1
TAKE ONE TABLET BY MOUTH EVERY WEEK TABLET ORAL
Qty: 12 | Refills: 0 | Status: ON HOLD | COMMUNITY
Start: 2020-02-06

## 2023-05-12 RX ORDER — FLUCONAZOLE 100 MG/1
TABLET ORAL
Qty: 8 | Refills: 0 | Status: ON HOLD | COMMUNITY
Start: 2018-10-09

## 2023-05-12 RX ORDER — INSULIN DEGLUDEC INJECTION 100 U/ML
AS DIRECTED INJECTION, SOLUTION SUBCUTANEOUS
Status: ON HOLD | COMMUNITY

## 2023-05-12 RX ORDER — HYDROCHLOROTHIAZIDE 25 MG/1
1 TABLET IN THE MORNING TABLET ORAL ONCE A DAY
Status: ACTIVE | COMMUNITY

## 2023-05-12 RX ORDER — TELMISARTAN 80 MG/1
1 TABLET TABLET ORAL ONCE A DAY
Status: ACTIVE | COMMUNITY

## 2023-05-12 RX ORDER — ONDANSETRON 4 MG/1
TABLET, ORALLY DISINTEGRATING ORAL
Qty: 20 | Refills: 0 | Status: ON HOLD | COMMUNITY
Start: 2019-06-21

## 2023-05-12 RX ORDER — GABAPENTIN 300 MG/1
1 CAPSULE CAPSULE ORAL ONCE A DAY
Status: ACTIVE | COMMUNITY

## 2023-05-12 RX ORDER — ALBUTEROL SULFATE 90 UG/1
AEROSOL, METERED RESPIRATORY (INHALATION)
Qty: 8 | Refills: 0 | Status: ACTIVE | COMMUNITY
Start: 2018-10-03

## 2023-05-12 RX ORDER — SUCRALFATE 1 G/1
TABLET ORAL
Qty: 120 | Refills: 0 | Status: ON HOLD | COMMUNITY
Start: 2019-08-30

## 2023-05-12 RX ORDER — NIFEDIPINE 60 MG/1
TAKE ONE TABLET BY MOUTH ONE TIME DAILY TABLET, FILM COATED, EXTENDED RELEASE ORAL
Qty: 90 | Refills: 0 | Status: ON HOLD | COMMUNITY
Start: 2020-02-06

## 2023-05-12 RX ORDER — DULOXETINE HYDROCHLORIDE 60 MG/1
1 CAPSULE CAPSULE, DELAYED RELEASE ORAL ONCE A DAY
Status: ACTIVE | COMMUNITY

## 2023-05-12 RX ORDER — HYDROCODONE BITARTRATE AND ACETAMINOPHEN 5; 325 MG/1; MG/1
TABLET ORAL
Qty: 24 | Refills: 0 | Status: ON HOLD | COMMUNITY
Start: 2019-06-21

## 2023-05-12 RX ORDER — NYSTATIN AND TRIAMCINOLONE ACETONIDE 100000; 1 MG/G; MG/G
CREAM TOPICAL
Qty: 15 | Refills: 0 | Status: ON HOLD | COMMUNITY
Start: 2018-10-09

## 2023-05-12 RX ORDER — METHYLPREDNISOLONE 4 MG/1
TABLET ORAL
Qty: 21 | Refills: 0 | Status: ON HOLD | COMMUNITY
Start: 2019-10-01

## 2023-05-12 RX ORDER — EMPAGLIFLOZIN AND METFORMIN HYDROCHLORIDE 12.5; 1 MG/1; MG/1
TAKE TWO TABLETS BY MOUTH ONE TIME DAILY TABLET ORAL
Qty: 60 | Refills: 0 | Status: ON HOLD | COMMUNITY
Start: 2018-11-06

## 2023-05-12 RX ORDER — RIVAROXABAN 20 MG/1
1 TABLET WITH FOOD TABLET, FILM COATED ORAL ONCE A DAY
Status: ACTIVE | COMMUNITY

## 2023-05-12 RX ORDER — DIAZEPAM 5 MG/1
TAKE ONE TABLET BY MOUTH 30 MINUTES PRIOR TO PROCEDURE TABLET ORAL
Qty: 1 | Refills: 0 | Status: ON HOLD | COMMUNITY
Start: 2019-09-10

## 2023-05-12 RX ORDER — ATORVASTATIN CALCIUM 20 MG/1
TABLET, FILM COATED ORAL
Qty: 90 | Refills: 0 | Status: ON HOLD | COMMUNITY
Start: 2018-12-08

## 2023-05-12 RX ORDER — SIMETHICONE 80 MG/1
1 TABLET AFTER MEALS AND AT BEDTIME AS NEEDED TABLET, CHEWABLE ORAL
Status: ACTIVE | COMMUNITY

## 2023-05-15 ENCOUNTER — OFFICE VISIT (OUTPATIENT)
Dept: URBAN - METROPOLITAN AREA MEDICAL CENTER 40 | Facility: MEDICAL CENTER | Age: 78
End: 2023-05-15
Payer: MEDICARE

## 2023-05-15 DIAGNOSIS — D50.0 IRON DEFICIENCY ANEMIA DUE TO CHRONIC BLOOD LOSS: ICD-10-CM

## 2023-05-15 DIAGNOSIS — K21.9 GERD: ICD-10-CM

## 2023-05-15 DIAGNOSIS — R13.19 DYSPHAGIA: ICD-10-CM

## 2023-05-15 DIAGNOSIS — K31.819 ACQUIRED ARTERIOVENOUS MALFORMATION OF DUODENUM: ICD-10-CM

## 2023-05-15 PROCEDURE — 43270 EGD LESION ABLATION: CPT | Performed by: INTERNAL MEDICINE

## 2023-05-16 ENCOUNTER — OFFICE VISIT (OUTPATIENT)
Dept: URBAN - METROPOLITAN AREA CLINIC 72 | Facility: CLINIC | Age: 78
End: 2023-05-16

## 2023-06-06 ENCOUNTER — LAB OUTSIDE AN ENCOUNTER (OUTPATIENT)
Dept: URBAN - METROPOLITAN AREA CLINIC 72 | Facility: CLINIC | Age: 78
End: 2023-06-06

## 2023-06-19 ENCOUNTER — OFFICE VISIT (OUTPATIENT)
Dept: URBAN - METROPOLITAN AREA MEDICAL CENTER 40 | Facility: MEDICAL CENTER | Age: 78
End: 2023-06-19

## 2023-06-23 NOTE — ED STATDOCS - NSSERVICENOTAVAIL_ED_A_ED
Individual Note      Date: 6/21/2023  Start Time: 12:40PM  End Time: 1:20 PM     Mental Status Exam - Pt presented alert and oriented x4. Pt was pleasant and forthcoming with information. Pts mood appeared anxious. Pt present with tangential thought process, clear speech. Pt denied SI/HI/AVH. Individual Session Notes - Pt reported she is doing well. Pt reported symptoms of anxiety and depression have been manageable within the past month. Pt shared symptoms of anger have improved and has avoided conflict with individuals. Pt shared her current living situation continues to be a main stressor. Pt appears motivated to take the appropriate steps to secure housing and employment. Pt has been in contact with [employer] to complete the welfare process. Pt shared in order to be eligible for welfare, she has to be employed. Pt plans to apply at a local [employer]. Pt shared she will need to find  for the baby. SW to assist pt in finding . Pt shared she has continued to work with [address] [place] [address] . Pt denied experiencing any cravings. Pt's goal is to continue sobriety, practice self-care, and attend scheduled appointments. Discussed barriers in pt receiving her drivers license. Peer support and SW to assist pt in researching the cost and process. SW to contact [facility] to identify if this is a cost that is covered. Pt also expressed interest in schedule an appointment for new eye glasses and a dermatologist. Johnathon Green and peer to assist pt in locating offices that accept pt's insurance. Pt is to check-in with Peer Support - Araceli 1x per week and  biweekly or as needed. Peer support - Yadi Boudreaux was present during session. SW and peer support provided emotional support, active listening, and feedback during session. Pt scheduled for follow up in 1 month with GARRY - Etta Myers. Pt requested to meet with  and peer support biweekly.  Pt scheduled in 2 weeks to follow
Patient was discussed in treatment team. Patient has been attending the [employer]. Pt has engaged in services with Dr. Leidy Ramirez, and peer support [place]. Discussed pt's progress in the program since being established. Pt has been forthcoming when sharing information during sessions. Pt attends appointments regularly. Pt continues to report stressors including housing, transportation, and relationship with her significant other. SW and peer support are actively working to assist pt with her needs. Pt's most recent UDS resulted in a negative screen of all substances. Pt is utilizing an MAT program and takes Suboxone as prescribed. Pt has established tx goals with KELLY and Dr. Last Agee. Pt to continue with the [employer].     Pt was recently changed to monthly appointments with Dr. Last Agee. Pt requested to schedule bi-weekly appointments with .      Electronically signed by CHANDRAKANT Peñaloza, GEMW on 6/23/2023 at 10:12 AM
Other:

## 2023-07-11 ENCOUNTER — OFFICE VISIT (OUTPATIENT)
Dept: URBAN - METROPOLITAN AREA CLINIC 72 | Facility: CLINIC | Age: 78
End: 2023-07-11
Payer: MEDICARE

## 2023-07-11 VITALS
HEART RATE: 103 BPM | HEIGHT: 68 IN | BODY MASS INDEX: 35.31 KG/M2 | TEMPERATURE: 97.1 F | RESPIRATION RATE: 20 BRPM | WEIGHT: 233 LBS | SYSTOLIC BLOOD PRESSURE: 110 MMHG | DIASTOLIC BLOOD PRESSURE: 65 MMHG

## 2023-07-11 DIAGNOSIS — D50.0 IRON DEFICIENCY ANEMIA DUE TO CHRONIC BLOOD LOSS: ICD-10-CM

## 2023-07-11 DIAGNOSIS — R10.13 DYSPEPSIA: ICD-10-CM

## 2023-07-11 DIAGNOSIS — K31.819 GAVE (GASTRIC ANTRAL VASCULAR ECTASIA): ICD-10-CM

## 2023-07-11 PROCEDURE — 99214 OFFICE O/P EST MOD 30 MIN: CPT | Performed by: INTERNAL MEDICINE

## 2023-07-11 RX ORDER — METHYLPREDNISOLONE 4 MG/1
TABLET ORAL
Qty: 21 | Refills: 0 | Status: ON HOLD | COMMUNITY
Start: 2019-10-01

## 2023-07-11 RX ORDER — INSULIN DETEMIR 100 [IU]/ML
INJECTION, SOLUTION SUBCUTANEOUS
Qty: 15 | Refills: 0 | Status: ON HOLD | COMMUNITY
Start: 2018-10-04

## 2023-07-11 RX ORDER — SUCRALFATE 1 G/1
TABLET ORAL
Qty: 120 | Refills: 0 | Status: ON HOLD | COMMUNITY
Start: 2019-08-30

## 2023-07-11 RX ORDER — PREDNISONE 20 MG/1
TABLET ORAL
Qty: 10 | Refills: 0 | Status: ON HOLD | COMMUNITY
Start: 2018-10-03

## 2023-07-11 RX ORDER — DULOXETINE HYDROCHLORIDE 60 MG/1
1 CAPSULE CAPSULE, DELAYED RELEASE ORAL ONCE A DAY
Status: ACTIVE | COMMUNITY

## 2023-07-11 RX ORDER — LEVOTHYROXINE SODIUM 112 UG/1
1 TABLET IN THE MORNING ON AN EMPTY STOMACH TABLET ORAL ONCE A DAY
Status: ACTIVE | COMMUNITY

## 2023-07-11 RX ORDER — FAMOTIDINE 40 MG/1
TABLET ORAL
Qty: 180 | Refills: 0 | Status: ACTIVE | COMMUNITY
Start: 2019-06-05

## 2023-07-11 RX ORDER — HYDROCODONE BITARTRATE AND ACETAMINOPHEN 5; 325 MG/1; MG/1
TABLET ORAL
Qty: 24 | Refills: 0 | Status: ON HOLD | COMMUNITY
Start: 2019-06-21

## 2023-07-11 RX ORDER — RIVAROXABAN 20 MG/1
1 TABLET WITH FOOD TABLET, FILM COATED ORAL ONCE A DAY
Status: ACTIVE | COMMUNITY

## 2023-07-11 RX ORDER — TELMISARTAN 80 MG/1
1 TABLET TABLET ORAL ONCE A DAY
Status: ACTIVE | COMMUNITY

## 2023-07-11 RX ORDER — ONDANSETRON 4 MG/1
TABLET, ORALLY DISINTEGRATING ORAL
Qty: 20 | Refills: 0 | Status: ON HOLD | COMMUNITY
Start: 2019-06-21

## 2023-07-11 RX ORDER — INSULIN DEGLUDEC INJECTION 100 U/ML
AS DIRECTED INJECTION, SOLUTION SUBCUTANEOUS
Status: ACTIVE | COMMUNITY

## 2023-07-11 RX ORDER — ATORVASTATIN CALCIUM 40 MG/1
1 TABLET TABLET, FILM COATED ORAL ONCE A DAY
Status: ON HOLD | COMMUNITY

## 2023-07-11 RX ORDER — ASPIRIN 81 MG
TABLET, DELAYED RELEASE (ENTERIC COATED) ORAL
Refills: 0 | Status: ACTIVE | COMMUNITY

## 2023-07-11 RX ORDER — SIMETHICONE 80 MG/1
1 TABLET AFTER MEALS AND AT BEDTIME AS NEEDED TABLET, CHEWABLE ORAL
Status: ON HOLD | COMMUNITY

## 2023-07-11 RX ORDER — ALENDRONATE SODIUM 70 MG/1
TAKE ONE TABLET BY MOUTH EVERY WEEK TABLET ORAL
Qty: 12 | Refills: 0 | Status: ON HOLD | COMMUNITY
Start: 2020-02-06

## 2023-07-11 RX ORDER — LEVOTHYROXINE SODIUM 0.1 MG/1
TAKE 1 TABLET DAILY AS DIRECTED TABLET ORAL
Refills: 0 | Status: ON HOLD | COMMUNITY
Start: 2019-01-30

## 2023-07-11 RX ORDER — DIAZEPAM 5 MG/1
TAKE ONE TABLET BY MOUTH 30 MINUTES PRIOR TO PROCEDURE TABLET ORAL
Qty: 1 | Refills: 0 | Status: ON HOLD | COMMUNITY
Start: 2019-09-10

## 2023-07-11 RX ORDER — NIFEDIPINE 60 MG/1
TAKE ONE TABLET BY MOUTH ONE TIME DAILY TABLET, FILM COATED, EXTENDED RELEASE ORAL
Qty: 90 | Refills: 0 | Status: ON HOLD | COMMUNITY
Start: 2020-02-06

## 2023-07-11 RX ORDER — EMPAGLIFLOZIN, METFORMIN HYDROCHLORIDE 12.5; 1 MG/1; MG/1
TABLET, EXTENDED RELEASE ORAL
Refills: 0 | Status: ON HOLD | COMMUNITY
Start: 2019-10-02

## 2023-07-11 RX ORDER — NYSTATIN AND TRIAMCINOLONE ACETONIDE 100000; 1 MG/G; MG/G
CREAM TOPICAL
Qty: 15 | Refills: 0 | Status: ON HOLD | COMMUNITY
Start: 2018-10-09

## 2023-07-11 RX ORDER — DOCUSATE SODIUM 100 MG/1
TAKE ONE CAPSULE BY MOUTH THREE TIMES A DAY CAPSULE, LIQUID FILLED ORAL
Qty: 30 | Refills: 0 | Status: ON HOLD | COMMUNITY
Start: 2019-06-21

## 2023-07-11 RX ORDER — HYDROCHLOROTHIAZIDE 25 MG/1
1 TABLET IN THE MORNING TABLET ORAL ONCE A DAY
Status: ACTIVE | COMMUNITY

## 2023-07-11 RX ORDER — AMOXICILLIN 875 MG/1
TABLET, FILM COATED ORAL
Qty: 14 | Refills: 0 | Status: ON HOLD | COMMUNITY
Start: 2018-10-03

## 2023-07-11 RX ORDER — ATORVASTATIN CALCIUM 20 MG/1
TABLET, FILM COATED ORAL
Qty: 90 | Refills: 0 | Status: ON HOLD | COMMUNITY
Start: 2018-12-08

## 2023-07-11 RX ORDER — NIFEDIPINE 60 MG/1
TAKE 1 TABLET DAILY TABLET, EXTENDED RELEASE ORAL
Refills: 0 | Status: ON HOLD | COMMUNITY
Start: 2018-12-01

## 2023-07-11 RX ORDER — INSULIN DEGLUDEC INJECTION 100 U/ML
AS DIRECTED INJECTION, SOLUTION SUBCUTANEOUS
Status: ON HOLD | COMMUNITY

## 2023-07-11 RX ORDER — ALBUTEROL SULFATE 90 UG/1
AEROSOL, METERED RESPIRATORY (INHALATION)
Qty: 8 | Refills: 0 | Status: ACTIVE | COMMUNITY
Start: 2018-10-03

## 2023-07-11 RX ORDER — PANTOPRAZOLE SODIUM 40 MG/1
1 TABLET TABLET, DELAYED RELEASE ORAL ONCE A DAY
Status: ACTIVE | COMMUNITY

## 2023-07-11 RX ORDER — EMPAGLIFLOZIN AND METFORMIN HYDROCHLORIDE 12.5; 1 MG/1; MG/1
TAKE TWO TABLETS BY MOUTH ONE TIME DAILY TABLET ORAL
Qty: 60 | Refills: 0 | Status: ON HOLD | COMMUNITY
Start: 2018-11-06

## 2023-07-11 RX ORDER — FLUCONAZOLE 100 MG/1
TABLET ORAL
Qty: 8 | Refills: 0 | Status: ON HOLD | COMMUNITY
Start: 2018-10-09

## 2023-07-11 RX ORDER — GABAPENTIN 300 MG/1
1 CAPSULE CAPSULE ORAL ONCE A DAY
Status: ACTIVE | COMMUNITY

## 2023-07-11 RX ORDER — OXYCODONE HYDROCHLORIDE 5 MG/1
TAKE ONE TABLET BY MOUTH EVERY 4 TO 6 HOURS AS NEEDED FOR SEVERE PAIN TABLET ORAL
Qty: 7 | Refills: 0 | Status: ON HOLD | COMMUNITY
Start: 2019-09-13

## 2023-07-11 RX ORDER — AMITRIPTYLINE HYDROCHLORIDE 25 MG/1
TABLET, FILM COATED ORAL
Qty: 30 | Refills: 0 | Status: ON HOLD | COMMUNITY
Start: 2019-09-26

## 2023-07-11 NOTE — HPI-TODAY'S VISIT:
Ms. Mcclure returns for follow-up.  Recall she is a pleasant 78-year-old female last here office on 4/25/2023.  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  Imaging summary: CT scan abdomen pelvis with contrast January 2021 2 cm right renal cyst unchanged, pelvic soft tissue mass unchanged with nodule throughout the peritoneal cavity and a 6 x 1 x 2.6 cm right abdominal omental mass previously 5.5 x 3.1.  Small masses which are stable, diverticulosis with no enlarged lymph nodes and a small fat-containing hernia, status postcholecystectomy.  In March 2023 she was seen at the ER at [facility] after a fall found to be anemic with heme positive stool, had an EGD there that showed GAVE underwent ablation and received multiple transfusions.  She then had multiple bouts of nausea and vomiting placed on iron infusions after developing constipation with oral iron.  EGD findings at that time showed normal esophagus, 8 mm ulcerated sessile polypoid lesion in the antrum with bleeding which was biopsied and thickened folds in which APC was used to ablate with no further bleeding identified.  The gastric polyp showed chronic glandular proliferation with atypia indefinite for glandular dysplasia with no intestinal metaplasia.  A repeat endoscopy is planned in 3 months.  Since that time she reported episodic vomiting with no reflux.  We decided to proceed with EGD.  5/15/2023 ferritin 128, iron saturation 11 with TIBC of 265 and iron level 29 EGD 5/15/2023 showed GAVE in the antrum and vascular ectasia at the GE junction both treated with APC.  Duodenum was normal.  There was narrowing at the GE junction which was dilated with a 70 mm guidewire dilation.  She reports that she is feeling well.  She is in a trial for chemotherapy for her ovarian cancer.  She has not had any overt signs of bleeding.  Reports her last hemoglobin a few weeks ago was up to 9.  She is being followed by oncology/hematology who did not want her to have any further treatment at this point for her iron deficiency.

## 2023-09-08 ENCOUNTER — TELEPHONE ENCOUNTER (OUTPATIENT)
Dept: URBAN - METROPOLITAN AREA CLINIC 72 | Facility: CLINIC | Age: 78
End: 2023-09-08

## 2023-11-14 NOTE — ED ADULT NURSE NOTE - ALCOHOL PRE SCREEN (AUDIT - C)
Medication:   Requested Prescriptions     Pending Prescriptions Disp Refills    atorvastatin (LIPITOR) 40 MG tablet [Pharmacy Med Name: ATORVASTATIN 40 MG TABLET] 90 tablet 1     Sig: TAKE 1 TABLET BY MOUTH EVERY DAY AT NIGHT     Last Filled:  5/11/2023    Last appt: 8/24/2023   Next appt: 11/29/2023    Last Lipid:   Lab Results   Component Value Date/Time    CHOL 166 05/30/2023 09:19 AM    CHOL 158 11/11/2022 08:14 AM    TRIG 149 05/30/2023 09:19 AM    HDL 43 05/30/2023 09:19 AM    LDLCALC 75 05/23/2022 12:00 AM
Statement Selected

## 2024-01-08 ENCOUNTER — LAB OUTSIDE AN ENCOUNTER (OUTPATIENT)
Dept: URBAN - METROPOLITAN AREA CLINIC 72 | Facility: CLINIC | Age: 79
End: 2024-01-08

## 2024-01-08 ENCOUNTER — OFFICE VISIT (OUTPATIENT)
Dept: URBAN - METROPOLITAN AREA CLINIC 72 | Facility: CLINIC | Age: 79
End: 2024-01-08
Payer: MEDICARE

## 2024-01-08 VITALS
HEIGHT: 68 IN | WEIGHT: 228.2 LBS | DIASTOLIC BLOOD PRESSURE: 67 MMHG | SYSTOLIC BLOOD PRESSURE: 105 MMHG | TEMPERATURE: 97.3 F | BODY MASS INDEX: 34.59 KG/M2 | HEART RATE: 83 BPM

## 2024-01-08 DIAGNOSIS — D50.0 IRON DEFICIENCY ANEMIA DUE TO CHRONIC BLOOD LOSS: ICD-10-CM

## 2024-01-08 DIAGNOSIS — Z79.01 CHRONIC ANTICOAGULATION: ICD-10-CM

## 2024-01-08 DIAGNOSIS — R10.13 EPIGASTRIC PAIN: ICD-10-CM

## 2024-01-08 DIAGNOSIS — K31.819 GASTRIC ANTRAL VASCULAR ECTASIA: ICD-10-CM

## 2024-01-08 PROBLEM — 8801000175101: Status: ACTIVE | Noted: 2024-01-08

## 2024-01-08 PROCEDURE — 99214 OFFICE O/P EST MOD 30 MIN: CPT | Performed by: NURSE PRACTITIONER

## 2024-01-08 RX ORDER — SUCRALFATE 1 G/1
TABLET ORAL
Qty: 120 | Refills: 0 | Status: ON HOLD | COMMUNITY
Start: 2019-08-30

## 2024-01-08 RX ORDER — EMPAGLIFLOZIN AND METFORMIN HYDROCHLORIDE 12.5; 1 MG/1; MG/1
TAKE TWO TABLETS BY MOUTH ONE TIME DAILY TABLET ORAL
Qty: 60 | Refills: 0 | Status: ON HOLD | COMMUNITY
Start: 2018-11-06

## 2024-01-08 RX ORDER — INSULIN DETEMIR 100 [IU]/ML
INJECTION, SOLUTION SUBCUTANEOUS
Qty: 15 | Refills: 0 | Status: ON HOLD | COMMUNITY
Start: 2018-10-04

## 2024-01-08 RX ORDER — INSULIN DEGLUDEC INJECTION 100 U/ML
AS DIRECTED INJECTION, SOLUTION SUBCUTANEOUS
Status: ACTIVE | COMMUNITY

## 2024-01-08 RX ORDER — INSULIN DEGLUDEC INJECTION 100 U/ML
AS DIRECTED INJECTION, SOLUTION SUBCUTANEOUS
Status: ON HOLD | COMMUNITY

## 2024-01-08 RX ORDER — ALENDRONATE SODIUM 70 MG/1
TAKE ONE TABLET BY MOUTH EVERY WEEK TABLET ORAL
Qty: 12 | Refills: 0 | Status: ON HOLD | COMMUNITY
Start: 2020-02-06

## 2024-01-08 RX ORDER — ALBUTEROL SULFATE 90 UG/1
AEROSOL, METERED RESPIRATORY (INHALATION)
Qty: 8 | Refills: 0 | Status: ACTIVE | COMMUNITY
Start: 2018-10-03

## 2024-01-08 RX ORDER — DULOXETINE HYDROCHLORIDE 60 MG/1
1 CAPSULE CAPSULE, DELAYED RELEASE ORAL ONCE A DAY
Status: ACTIVE | COMMUNITY

## 2024-01-08 RX ORDER — NIFEDIPINE 60 MG/1
TAKE 1 TABLET DAILY TABLET, EXTENDED RELEASE ORAL
Refills: 0 | Status: ON HOLD | COMMUNITY
Start: 2018-12-01

## 2024-01-08 RX ORDER — MIRVETUXIMAB SORAVTANSINE 100 MG/20ML
AS DIRECTED INJECTION, SOLUTION INTRAVENOUS
Status: ACTIVE | COMMUNITY

## 2024-01-08 RX ORDER — GABAPENTIN 300 MG/1
1 CAPSULE CAPSULE ORAL
Status: ACTIVE | COMMUNITY

## 2024-01-08 RX ORDER — ATORVASTATIN CALCIUM 40 MG/1
1 TABLET TABLET, FILM COATED ORAL ONCE A DAY
Status: ON HOLD | COMMUNITY

## 2024-01-08 RX ORDER — HYDRALAZINE HYDROCHLORIDE 10 MG/1
1 TABLET WITH FOOD TABLET, FILM COATED ORAL
Status: ACTIVE | COMMUNITY

## 2024-01-08 RX ORDER — DIAZEPAM 5 MG/1
TAKE ONE TABLET BY MOUTH 30 MINUTES PRIOR TO PROCEDURE TABLET ORAL
Qty: 1 | Refills: 0 | Status: ON HOLD | COMMUNITY
Start: 2019-09-10

## 2024-01-08 RX ORDER — FLUCONAZOLE 100 MG/1
TABLET ORAL
Qty: 8 | Refills: 0 | Status: ON HOLD | COMMUNITY
Start: 2018-10-09

## 2024-01-08 RX ORDER — PREDNISONE 20 MG/1
TABLET ORAL
Qty: 10 | Refills: 0 | Status: ON HOLD | COMMUNITY
Start: 2018-10-03

## 2024-01-08 RX ORDER — HYDROCODONE BITARTRATE AND ACETAMINOPHEN 5; 325 MG/1; MG/1
TABLET ORAL
Qty: 24 | Refills: 0 | Status: ON HOLD | COMMUNITY
Start: 2019-06-21

## 2024-01-08 RX ORDER — AMOXICILLIN 875 MG/1
TABLET, FILM COATED ORAL
Qty: 14 | Refills: 0 | Status: ON HOLD | COMMUNITY
Start: 2018-10-03

## 2024-01-08 RX ORDER — LEVOTHYROXINE SODIUM 112 UG/1
1 TABLET IN THE MORNING ON AN EMPTY STOMACH TABLET ORAL ONCE A DAY
Status: ACTIVE | COMMUNITY

## 2024-01-08 RX ORDER — HYDROCHLOROTHIAZIDE 25 MG/1
1 TABLET IN THE MORNING TABLET ORAL ONCE A DAY
Status: ON HOLD | COMMUNITY

## 2024-01-08 RX ORDER — ATORVASTATIN CALCIUM 20 MG/1
TABLET, FILM COATED ORAL
Qty: 90 | Refills: 0 | Status: ON HOLD | COMMUNITY
Start: 2018-12-08

## 2024-01-08 RX ORDER — AMITRIPTYLINE HYDROCHLORIDE 25 MG/1
TABLET, FILM COATED ORAL
Qty: 30 | Refills: 0 | Status: ON HOLD | COMMUNITY
Start: 2019-09-26

## 2024-01-08 RX ORDER — FAMOTIDINE 40 MG/1
TABLET ORAL
Qty: 180 | Refills: 0 | Status: ACTIVE | COMMUNITY
Start: 2019-06-05

## 2024-01-08 RX ORDER — NYSTATIN AND TRIAMCINOLONE ACETONIDE 100000; 1 MG/G; MG/G
CREAM TOPICAL
Qty: 15 | Refills: 0 | Status: ON HOLD | COMMUNITY
Start: 2018-10-09

## 2024-01-08 RX ORDER — METHYLPREDNISOLONE 4 MG/1
TABLET ORAL
Qty: 21 | Refills: 0 | Status: ON HOLD | COMMUNITY
Start: 2019-10-01

## 2024-01-08 RX ORDER — PANTOPRAZOLE SODIUM 40 MG/1
1 TABLET TABLET, DELAYED RELEASE ORAL ONCE A DAY
Status: ACTIVE | COMMUNITY

## 2024-01-08 RX ORDER — ONDANSETRON 4 MG/1
TABLET, ORALLY DISINTEGRATING ORAL
Qty: 20 | Refills: 0 | Status: ON HOLD | COMMUNITY
Start: 2019-06-21

## 2024-01-08 RX ORDER — AMLODIPINE BESYLATE 5 MG/1
1 TABLET TABLET ORAL ONCE A DAY
Status: ACTIVE | COMMUNITY

## 2024-01-08 RX ORDER — LEVOTHYROXINE SODIUM 0.1 MG/1
TAKE 1 TABLET DAILY AS DIRECTED TABLET ORAL
Refills: 0 | Status: ON HOLD | COMMUNITY
Start: 2019-01-30

## 2024-01-08 RX ORDER — EMPAGLIFLOZIN, METFORMIN HYDROCHLORIDE 12.5; 1 MG/1; MG/1
TABLET, EXTENDED RELEASE ORAL
Refills: 0 | Status: ON HOLD | COMMUNITY
Start: 2019-10-02

## 2024-01-08 RX ORDER — TELMISARTAN 80 MG/1
1 TABLET TABLET ORAL ONCE A DAY
Status: ACTIVE | COMMUNITY

## 2024-01-08 RX ORDER — RIVAROXABAN 20 MG/1
1 TABLET WITH FOOD TABLET, FILM COATED ORAL ONCE A DAY
Status: ACTIVE | COMMUNITY

## 2024-01-08 RX ORDER — SIMETHICONE 80 MG/1
1 TABLET AFTER MEALS AND AT BEDTIME AS NEEDED TABLET, CHEWABLE ORAL
Status: ON HOLD | COMMUNITY

## 2024-01-08 RX ORDER — OXYCODONE HYDROCHLORIDE 5 MG/1
TAKE ONE TABLET BY MOUTH EVERY 4 TO 6 HOURS AS NEEDED FOR SEVERE PAIN TABLET ORAL
Qty: 7 | Refills: 0 | Status: ON HOLD | COMMUNITY
Start: 2019-09-13

## 2024-01-08 RX ORDER — DOCUSATE SODIUM 100 MG/1
TAKE ONE CAPSULE BY MOUTH THREE TIMES A DAY CAPSULE, LIQUID FILLED ORAL
Qty: 30 | Refills: 0 | Status: ON HOLD | COMMUNITY
Start: 2019-06-21

## 2024-01-08 RX ORDER — ASPIRIN 81 MG
TABLET, DELAYED RELEASE (ENTERIC COATED) ORAL
Refills: 0 | Status: ON HOLD | COMMUNITY

## 2024-01-08 RX ORDER — NIFEDIPINE 60 MG/1
TAKE ONE TABLET BY MOUTH ONE TIME DAILY TABLET, FILM COATED, EXTENDED RELEASE ORAL
Qty: 90 | Refills: 0 | Status: ON HOLD | COMMUNITY
Start: 2020-02-06

## 2024-01-08 NOTE — HPI-TODAY'S VISIT:
78-year-old female here for a 6-month follow-up appointment.    Last seen 7/11/2023 for ROSALBA, dyspepsia, GAVE with prior APC ablation x 2.  She follows hematology did not tolerate oral iron due to constipation side effects.  If bleeding recurs further ablation could be done.  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  In March 2023 she was seen at the ER at [facility] after a fall found to be anemic with heme positive stool, had an EGD there that showed GAVE underwent ablation and received multiple transfusions.  She then had multiple bouts of nausea and vomiting placed on iron infusions after developing constipation with oral iron.  EGD findings at that time showed normal esophagus, 8 mm ulcerated sessile polypoid lesion in the antrum with bleeding which was biopsied and thickened folds in which APC was used to ablate with no further bleeding identified.  The gastric polyp showed chronic glandular proliferation with atypia indefinite for glandular dysplasia with no intestinal metaplasia.  A repeat endoscopy is planned in 3 months.  Today she is here with a friend.  Has pain in her epigastric area after finished eating. Started a month ago. Occurs with every meal.  Was told it was fro her hiatal hernia and not secondary to oncology treatements. Reports dysphagia to small pills. Seeing Dr. Ceballos for hematologist on 1/24.  Dr. Wang manages her oncology treatment.  No melena or hematochezia.   Hx of DVT in 2019, Dr. Qiu her PCP prescribes her Xarelto.

## 2024-01-08 NOTE — HPI-OTHER HISTORIES
Imaging summary: -CT scan abdomen pelvis with contrast January 2021 2 cm right renal cyst unchanged, pelvic soft tissue mass unchanged with nodule throughout the peritoneal cavity and a 6 x 1 x 2.6 cm right abdominal omental mass previously 5.5 x 3.1. Small masses which are stable, diverticulosis with no enlarged lymph nodes and a small fat-containing hernia, status postcholecystectomy. . Labs: -5/15/2023 ferritin 128, iron saturation 11 with TIBC of 265 and iron level 29-9/13/2023. CA-125 elevated 172. -Labs she showed me from her phone.  1/3/24. CBC: Hgb 14, Hct 43.2, .6. CMP: Glucose 140. Iron 48, 19.4% iron saturation. Ferritin 420. . Procedures: -EGD 5/15/2023 showed GAVE in the antrum and vascular ectasia at the GE junction both treated with APC. Duodenum was normal. There was narrowing at the GE junction which was dilated with a 70 mm guidewire dilation.

## 2024-01-11 ENCOUNTER — TELEPHONE ENCOUNTER (OUTPATIENT)
Dept: URBAN - METROPOLITAN AREA CLINIC 72 | Facility: CLINIC | Age: 79
End: 2024-01-11

## 2024-01-25 ENCOUNTER — OFFICE VISIT (OUTPATIENT)
Dept: URBAN - METROPOLITAN AREA MEDICAL CENTER 40 | Facility: MEDICAL CENTER | Age: 79
End: 2024-01-25
Payer: MEDICARE

## 2024-01-25 DIAGNOSIS — K22.2 ACQUIRED ESOPHAGEAL RING: ICD-10-CM

## 2024-01-25 DIAGNOSIS — K21.9 ACID REFLUX: ICD-10-CM

## 2024-01-25 DIAGNOSIS — K29.60 ADENOPAPILLOMATOSIS GASTRICA: ICD-10-CM

## 2024-01-25 PROCEDURE — 43239 EGD BIOPSY SINGLE/MULTIPLE: CPT | Performed by: INTERNAL MEDICINE

## 2024-01-25 RX ORDER — LEVOTHYROXINE SODIUM 112 UG/1
1 TABLET IN THE MORNING ON AN EMPTY STOMACH TABLET ORAL ONCE A DAY
Status: ACTIVE | COMMUNITY

## 2024-01-25 RX ORDER — HYDROCODONE BITARTRATE AND ACETAMINOPHEN 5; 325 MG/1; MG/1
TABLET ORAL
Qty: 24 | Refills: 0 | Status: ON HOLD | COMMUNITY
Start: 2019-06-21

## 2024-01-25 RX ORDER — NIFEDIPINE 60 MG/1
TAKE ONE TABLET BY MOUTH ONE TIME DAILY TABLET, FILM COATED, EXTENDED RELEASE ORAL
Qty: 90 | Refills: 0 | Status: ON HOLD | COMMUNITY
Start: 2020-02-06

## 2024-01-25 RX ORDER — EMPAGLIFLOZIN, METFORMIN HYDROCHLORIDE 12.5; 1 MG/1; MG/1
TABLET, EXTENDED RELEASE ORAL
Refills: 0 | Status: ON HOLD | COMMUNITY
Start: 2019-10-02

## 2024-01-25 RX ORDER — NIFEDIPINE 60 MG/1
TAKE 1 TABLET DAILY TABLET, EXTENDED RELEASE ORAL
Refills: 0 | Status: ON HOLD | COMMUNITY
Start: 2018-12-01

## 2024-01-25 RX ORDER — AMLODIPINE BESYLATE 5 MG/1
1 TABLET TABLET ORAL ONCE A DAY
Status: ACTIVE | COMMUNITY

## 2024-01-25 RX ORDER — DIAZEPAM 5 MG/1
TAKE ONE TABLET BY MOUTH 30 MINUTES PRIOR TO PROCEDURE TABLET ORAL
Qty: 1 | Refills: 0 | Status: ON HOLD | COMMUNITY
Start: 2019-09-10

## 2024-01-25 RX ORDER — ATORVASTATIN CALCIUM 40 MG/1
1 TABLET TABLET, FILM COATED ORAL ONCE A DAY
Status: ON HOLD | COMMUNITY

## 2024-01-25 RX ORDER — MIRVETUXIMAB SORAVTANSINE 100 MG/20ML
AS DIRECTED INJECTION, SOLUTION INTRAVENOUS
Status: ACTIVE | COMMUNITY

## 2024-01-25 RX ORDER — INSULIN DEGLUDEC INJECTION 100 U/ML
AS DIRECTED INJECTION, SOLUTION SUBCUTANEOUS
Status: ON HOLD | COMMUNITY

## 2024-01-25 RX ORDER — RIVAROXABAN 20 MG/1
1 TABLET WITH FOOD TABLET, FILM COATED ORAL ONCE A DAY
Status: ACTIVE | COMMUNITY

## 2024-01-25 RX ORDER — ASPIRIN 81 MG
TABLET, DELAYED RELEASE (ENTERIC COATED) ORAL
Refills: 0 | Status: ON HOLD | COMMUNITY

## 2024-01-25 RX ORDER — AMITRIPTYLINE HYDROCHLORIDE 25 MG/1
TABLET, FILM COATED ORAL
Qty: 30 | Refills: 0 | Status: ON HOLD | COMMUNITY
Start: 2019-09-26

## 2024-01-25 RX ORDER — ONDANSETRON 4 MG/1
TABLET, ORALLY DISINTEGRATING ORAL
Qty: 20 | Refills: 0 | Status: ON HOLD | COMMUNITY
Start: 2019-06-21

## 2024-01-25 RX ORDER — AMOXICILLIN 875 MG/1
TABLET, FILM COATED ORAL
Qty: 14 | Refills: 0 | Status: ON HOLD | COMMUNITY
Start: 2018-10-03

## 2024-01-25 RX ORDER — SIMETHICONE 80 MG/1
1 TABLET AFTER MEALS AND AT BEDTIME AS NEEDED TABLET, CHEWABLE ORAL
Status: ON HOLD | COMMUNITY

## 2024-01-25 RX ORDER — INSULIN DETEMIR 100 [IU]/ML
INJECTION, SOLUTION SUBCUTANEOUS
Qty: 15 | Refills: 0 | Status: ON HOLD | COMMUNITY
Start: 2018-10-04

## 2024-01-25 RX ORDER — ATORVASTATIN CALCIUM 20 MG/1
TABLET, FILM COATED ORAL
Qty: 90 | Refills: 0 | Status: ON HOLD | COMMUNITY
Start: 2018-12-08

## 2024-01-25 RX ORDER — ALBUTEROL SULFATE 90 UG/1
AEROSOL, METERED RESPIRATORY (INHALATION)
Qty: 8 | Refills: 0 | Status: ACTIVE | COMMUNITY
Start: 2018-10-03

## 2024-01-25 RX ORDER — DOCUSATE SODIUM 100 MG/1
TAKE ONE CAPSULE BY MOUTH THREE TIMES A DAY CAPSULE, LIQUID FILLED ORAL
Qty: 30 | Refills: 0 | Status: ON HOLD | COMMUNITY
Start: 2019-06-21

## 2024-01-25 RX ORDER — TELMISARTAN 80 MG/1
1 TABLET TABLET ORAL ONCE A DAY
Status: ACTIVE | COMMUNITY

## 2024-01-25 RX ORDER — DULOXETINE HYDROCHLORIDE 60 MG/1
1 CAPSULE CAPSULE, DELAYED RELEASE ORAL ONCE A DAY
Status: ACTIVE | COMMUNITY

## 2024-01-25 RX ORDER — INSULIN DEGLUDEC INJECTION 100 U/ML
AS DIRECTED INJECTION, SOLUTION SUBCUTANEOUS
Status: ACTIVE | COMMUNITY

## 2024-01-25 RX ORDER — LEVOTHYROXINE SODIUM 0.1 MG/1
TAKE 1 TABLET DAILY AS DIRECTED TABLET ORAL
Refills: 0 | Status: ON HOLD | COMMUNITY
Start: 2019-01-30

## 2024-01-25 RX ORDER — FLUCONAZOLE 100 MG/1
TABLET ORAL
Qty: 8 | Refills: 0 | Status: ON HOLD | COMMUNITY
Start: 2018-10-09

## 2024-01-25 RX ORDER — SUCRALFATE 1 G/1
TABLET ORAL
Qty: 120 | Refills: 0 | Status: ON HOLD | COMMUNITY
Start: 2019-08-30

## 2024-01-25 RX ORDER — METHYLPREDNISOLONE 4 MG/1
TABLET ORAL
Qty: 21 | Refills: 0 | Status: ON HOLD | COMMUNITY
Start: 2019-10-01

## 2024-01-25 RX ORDER — GABAPENTIN 300 MG/1
1 CAPSULE CAPSULE ORAL
Status: ACTIVE | COMMUNITY

## 2024-01-25 RX ORDER — EMPAGLIFLOZIN AND METFORMIN HYDROCHLORIDE 12.5; 1 MG/1; MG/1
TAKE TWO TABLETS BY MOUTH ONE TIME DAILY TABLET ORAL
Qty: 60 | Refills: 0 | Status: ON HOLD | COMMUNITY
Start: 2018-11-06

## 2024-01-25 RX ORDER — NYSTATIN AND TRIAMCINOLONE ACETONIDE 100000; 1 MG/G; MG/G
CREAM TOPICAL
Qty: 15 | Refills: 0 | Status: ON HOLD | COMMUNITY
Start: 2018-10-09

## 2024-01-25 RX ORDER — FAMOTIDINE 40 MG/1
TABLET ORAL
Qty: 180 | Refills: 0 | Status: ACTIVE | COMMUNITY
Start: 2019-06-05

## 2024-01-25 RX ORDER — ALENDRONATE SODIUM 70 MG/1
TAKE ONE TABLET BY MOUTH EVERY WEEK TABLET ORAL
Qty: 12 | Refills: 0 | Status: ON HOLD | COMMUNITY
Start: 2020-02-06

## 2024-01-25 RX ORDER — OXYCODONE HYDROCHLORIDE 5 MG/1
TAKE ONE TABLET BY MOUTH EVERY 4 TO 6 HOURS AS NEEDED FOR SEVERE PAIN TABLET ORAL
Qty: 7 | Refills: 0 | Status: ON HOLD | COMMUNITY
Start: 2019-09-13

## 2024-01-25 RX ORDER — HYDROCHLOROTHIAZIDE 25 MG/1
1 TABLET IN THE MORNING TABLET ORAL ONCE A DAY
Status: ON HOLD | COMMUNITY

## 2024-01-25 RX ORDER — HYDRALAZINE HYDROCHLORIDE 10 MG/1
1 TABLET WITH FOOD TABLET, FILM COATED ORAL
Status: ACTIVE | COMMUNITY

## 2024-01-25 RX ORDER — PREDNISONE 20 MG/1
TABLET ORAL
Qty: 10 | Refills: 0 | Status: ON HOLD | COMMUNITY
Start: 2018-10-03

## 2024-01-25 RX ORDER — PANTOPRAZOLE SODIUM 40 MG/1
1 TABLET TABLET, DELAYED RELEASE ORAL ONCE A DAY
Status: ACTIVE | COMMUNITY

## 2024-02-07 ENCOUNTER — OV EP (OUTPATIENT)
Dept: URBAN - METROPOLITAN AREA CLINIC 72 | Facility: CLINIC | Age: 79
End: 2024-02-07
Payer: MEDICARE

## 2024-02-07 VITALS
WEIGHT: 241 LBS | BODY MASS INDEX: 36.53 KG/M2 | HEIGHT: 68 IN | TEMPERATURE: 96.9 F | SYSTOLIC BLOOD PRESSURE: 97 MMHG | DIASTOLIC BLOOD PRESSURE: 64 MMHG | HEART RATE: 113 BPM

## 2024-02-07 DIAGNOSIS — D50.0 IRON DEFICIENCY ANEMIA DUE TO CHRONIC BLOOD LOSS: ICD-10-CM

## 2024-02-07 DIAGNOSIS — R10.13 EPIGASTRIC PAIN: ICD-10-CM

## 2024-02-07 DIAGNOSIS — K31.819 GASTRIC ANTRAL VASCULAR ECTASIA: ICD-10-CM

## 2024-02-07 DIAGNOSIS — R14.0 ABDOMINAL BLOATING: ICD-10-CM

## 2024-02-07 PROCEDURE — 99214 OFFICE O/P EST MOD 30 MIN: CPT | Performed by: NURSE PRACTITIONER

## 2024-02-07 RX ORDER — RIVAROXABAN 20 MG/1
1 TABLET WITH FOOD TABLET, FILM COATED ORAL ONCE A DAY
Status: ACTIVE | COMMUNITY

## 2024-02-07 RX ORDER — AMITRIPTYLINE HYDROCHLORIDE 25 MG/1
TABLET, FILM COATED ORAL
Qty: 30 | Refills: 0 | Status: ON HOLD | COMMUNITY
Start: 2019-09-26

## 2024-02-07 RX ORDER — FLUCONAZOLE 100 MG/1
TABLET ORAL
Qty: 8 | Refills: 0 | Status: ON HOLD | COMMUNITY
Start: 2018-10-09

## 2024-02-07 RX ORDER — EMPAGLIFLOZIN AND METFORMIN HYDROCHLORIDE 12.5; 1 MG/1; MG/1
TAKE TWO TABLETS BY MOUTH ONE TIME DAILY TABLET ORAL
Qty: 60 | Refills: 0 | Status: ON HOLD | COMMUNITY
Start: 2018-11-06

## 2024-02-07 RX ORDER — CHOLESTYRAMINE 4 G/9G
1 SCOOP POWDER, FOR SUSPENSION ORAL ONCE A DAY
Qty: 30 | Refills: 0 | OUTPATIENT
Start: 2024-02-07

## 2024-02-07 RX ORDER — DIAZEPAM 5 MG/1
TAKE ONE TABLET BY MOUTH 30 MINUTES PRIOR TO PROCEDURE TABLET ORAL
Qty: 1 | Refills: 0 | Status: ON HOLD | COMMUNITY
Start: 2019-09-10

## 2024-02-07 RX ORDER — SUCRALFATE 1 G/1
TABLET ORAL
Qty: 120 | Refills: 0 | Status: ON HOLD | COMMUNITY
Start: 2019-08-30

## 2024-02-07 RX ORDER — FAMOTIDINE 40 MG/1
TABLET ORAL
Qty: 180 | Refills: 0 | Status: ACTIVE | COMMUNITY
Start: 2019-06-05

## 2024-02-07 RX ORDER — HYDROCHLOROTHIAZIDE 25 MG/1
1 TABLET IN THE MORNING TABLET ORAL ONCE A DAY
Status: ON HOLD | COMMUNITY

## 2024-02-07 RX ORDER — ATORVASTATIN CALCIUM 40 MG/1
1 TABLET TABLET, FILM COATED ORAL ONCE A DAY
Status: ON HOLD | COMMUNITY

## 2024-02-07 RX ORDER — TELMISARTAN 80 MG/1
1 TABLET TABLET ORAL ONCE A DAY
Status: ACTIVE | COMMUNITY

## 2024-02-07 RX ORDER — NIFEDIPINE 60 MG/1
TAKE ONE TABLET BY MOUTH ONE TIME DAILY TABLET, FILM COATED, EXTENDED RELEASE ORAL
Qty: 90 | Refills: 0 | Status: ON HOLD | COMMUNITY
Start: 2020-02-06

## 2024-02-07 RX ORDER — EMPAGLIFLOZIN, METFORMIN HYDROCHLORIDE 12.5; 1 MG/1; MG/1
TABLET, EXTENDED RELEASE ORAL
Refills: 0 | Status: ON HOLD | COMMUNITY
Start: 2019-10-02

## 2024-02-07 RX ORDER — ALENDRONATE SODIUM 70 MG/1
TAKE ONE TABLET BY MOUTH EVERY WEEK TABLET ORAL
Qty: 12 | Refills: 0 | Status: ON HOLD | COMMUNITY
Start: 2020-02-06

## 2024-02-07 RX ORDER — ONDANSETRON 4 MG/1
TABLET, ORALLY DISINTEGRATING ORAL
Qty: 20 | Refills: 0 | Status: ON HOLD | COMMUNITY
Start: 2019-06-21

## 2024-02-07 RX ORDER — MIRVETUXIMAB SORAVTANSINE 100 MG/20ML
AS DIRECTED INJECTION, SOLUTION INTRAVENOUS
Status: ACTIVE | COMMUNITY

## 2024-02-07 RX ORDER — NIFEDIPINE 60 MG/1
TAKE 1 TABLET DAILY TABLET, EXTENDED RELEASE ORAL
Refills: 0 | Status: ON HOLD | COMMUNITY
Start: 2018-12-01

## 2024-02-07 RX ORDER — AMLODIPINE BESYLATE 5 MG/1
1 TABLET TABLET ORAL ONCE A DAY
Status: ACTIVE | COMMUNITY

## 2024-02-07 RX ORDER — INSULIN DEGLUDEC INJECTION 100 U/ML
AS DIRECTED INJECTION, SOLUTION SUBCUTANEOUS
Status: ON HOLD | COMMUNITY

## 2024-02-07 RX ORDER — NYSTATIN AND TRIAMCINOLONE ACETONIDE 100000; 1 MG/G; MG/G
CREAM TOPICAL
Qty: 15 | Refills: 0 | Status: ON HOLD | COMMUNITY
Start: 2018-10-09

## 2024-02-07 RX ORDER — GABAPENTIN 300 MG/1
1 CAPSULE CAPSULE ORAL
Status: ACTIVE | COMMUNITY

## 2024-02-07 RX ORDER — PANTOPRAZOLE SODIUM 40 MG/1
1 TABLET TABLET, DELAYED RELEASE ORAL ONCE A DAY
Status: ACTIVE | COMMUNITY

## 2024-02-07 RX ORDER — METHYLPREDNISOLONE 4 MG/1
TABLET ORAL
Qty: 21 | Refills: 0 | Status: ON HOLD | COMMUNITY
Start: 2019-10-01

## 2024-02-07 RX ORDER — LEVOTHYROXINE SODIUM 112 UG/1
1 TABLET IN THE MORNING ON AN EMPTY STOMACH TABLET ORAL ONCE A DAY
Status: ACTIVE | COMMUNITY

## 2024-02-07 RX ORDER — SIMETHICONE 80 MG/1
1 TABLET AFTER MEALS AND AT BEDTIME AS NEEDED TABLET, CHEWABLE ORAL
Status: ON HOLD | COMMUNITY

## 2024-02-07 RX ORDER — DULOXETINE HYDROCHLORIDE 60 MG/1
1 CAPSULE CAPSULE, DELAYED RELEASE ORAL ONCE A DAY
Status: ACTIVE | COMMUNITY

## 2024-02-07 RX ORDER — HYDRALAZINE HYDROCHLORIDE 10 MG/1
1 TABLET WITH FOOD TABLET, FILM COATED ORAL
Status: ACTIVE | COMMUNITY

## 2024-02-07 RX ORDER — PREDNISONE 20 MG/1
TABLET ORAL
Qty: 10 | Refills: 0 | Status: ON HOLD | COMMUNITY
Start: 2018-10-03

## 2024-02-07 RX ORDER — DOCUSATE SODIUM 100 MG/1
TAKE ONE CAPSULE BY MOUTH THREE TIMES A DAY CAPSULE, LIQUID FILLED ORAL
Qty: 30 | Refills: 0 | Status: ON HOLD | COMMUNITY
Start: 2019-06-21

## 2024-02-07 RX ORDER — INSULIN DETEMIR 100 [IU]/ML
INJECTION, SOLUTION SUBCUTANEOUS
Qty: 15 | Refills: 0 | Status: ON HOLD | COMMUNITY
Start: 2018-10-04

## 2024-02-07 RX ORDER — HYDROCODONE BITARTRATE AND ACETAMINOPHEN 5; 325 MG/1; MG/1
TABLET ORAL
Qty: 24 | Refills: 0 | Status: ON HOLD | COMMUNITY
Start: 2019-06-21

## 2024-02-07 RX ORDER — AMOXICILLIN 875 MG/1
TABLET, FILM COATED ORAL
Qty: 14 | Refills: 0 | Status: ON HOLD | COMMUNITY
Start: 2018-10-03

## 2024-02-07 RX ORDER — ASPIRIN 81 MG
TABLET, DELAYED RELEASE (ENTERIC COATED) ORAL
Refills: 0 | Status: ON HOLD | COMMUNITY

## 2024-02-07 RX ORDER — INSULIN DEGLUDEC INJECTION 100 U/ML
AS DIRECTED INJECTION, SOLUTION SUBCUTANEOUS
Status: ACTIVE | COMMUNITY

## 2024-02-07 RX ORDER — LEVOTHYROXINE SODIUM 0.1 MG/1
TAKE 1 TABLET DAILY AS DIRECTED TABLET ORAL
Refills: 0 | Status: ON HOLD | COMMUNITY
Start: 2019-01-30

## 2024-02-07 RX ORDER — OXYCODONE HYDROCHLORIDE 5 MG/1
TAKE ONE TABLET BY MOUTH EVERY 4 TO 6 HOURS AS NEEDED FOR SEVERE PAIN TABLET ORAL
Qty: 7 | Refills: 0 | Status: ON HOLD | COMMUNITY
Start: 2019-09-13

## 2024-02-07 RX ORDER — ALBUTEROL SULFATE 90 UG/1
AEROSOL, METERED RESPIRATORY (INHALATION)
Qty: 8 | Refills: 0 | Status: ACTIVE | COMMUNITY
Start: 2018-10-03

## 2024-02-07 RX ORDER — ATORVASTATIN CALCIUM 20 MG/1
TABLET, FILM COATED ORAL
Qty: 90 | Refills: 0 | Status: ON HOLD | COMMUNITY
Start: 2018-12-08

## 2024-02-07 NOTE — HPI-OTHER HISTORIES
Imaging summary: -CT scan abdomen pelvis with contrast January 2021 2 cm right renal cyst unchanged, pelvic soft tissue mass unchanged with nodule throughout the peritoneal cavity and a 6 x 1 x 2.6 cm right abdominal omental mass previously 5.5 x 3.1. Small masses which are stable, diverticulosis with no enlarged lymph nodes and a small fat-containing hernia, status postcholecystectomy. -Limited abdominal ultrasound 1/25/24 for therapeutic paracentesis revealed insufficient ascites. Only a small to moderate amount within left lower quadrant. -CTA chest abdomen and pelvis with contrast 1/29/2024 no obvious PE. Innumerable hepatic metastasis. Several indeterminate pulmonary nodules. Small volume ascites with multiple peritoneal metastasis involving abdomen and pelvis compatible with peritoneal carcinomatosis. Metastatic 5 cm lymph node anterior to cava involving upper abdomen. Peritoneal mass at the level of the pelvis abuts and may involve the sigmoid colon. . Labs: -5/15/2023 ferritin 128, iron saturation 11 with TIBC of 265 and iron level 29-9/13/2023. CA-125 elevated 172. -Labs she showed me from her phone.  1/3/24. CBC: Hgb 14, Hct 43.2, .6. CMP: Glucose 140. Iron 48, 19.4% iron saturation. Ferritin 420. -1/29/2024. CBC normal with Hgb 14.2, WBC 10.1. CMP: Albumin 2.9, alk phos 262, ALT 57, , GFR 59. INR normal at 1.1. . Procedures: -EGD 5/15/2023 showed GAVE in the antrum and vascular ectasia at the GE junction both treated with APC. Duodenum was normal. There was narrowing at the GE junction which was dilated with a 70 mm guidewire dilation. -EGD 1/25/2024 revealed Schatzki's ring at GE junction involving less than 25% of the lumen. Multiple biopsies performed. Mildly severe gastritis. Normal duodenum. Biopsies performed. Biopsy of esophagus showed chronic reflux disease.

## 2024-02-07 NOTE — HPI-TODAY'S VISIT:
78-year-old female here for EGD follow-up.   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 She follows hematology Dr. Ceballos did not tolerate oral iron due to constipation side effects. Dr. Wang is her gynecology oncologist.    Last seen 1/8/2024 for pill dysphagia, GAVE, ROSALBA and epigastric pain. Advised to continue PPI and EGD recommended for further evaluation.  Clearance requested to hold Xarelto.   Was in the ER 1/29/2024 at [facility] for shortness of breath and abdominal pain.  Diagnosed with cancer related pain given fentanyl patch and lorazepam.  In March 2023 she was seen at the ER at [facility] after a fall found to be anemic with heme positive stool, had an EGD there that showed GAVE underwent ablation and received multiple transfusions.  She then had multiple bouts of nausea and vomiting placed on iron infusions after developing constipation with oral iron. EGD findings at that time showed normal esophagus, 8 mm ulcerated sessile polypoid lesion in the antrum with bleeding which was biopsied and thickened folds in which APC was used to ablate with no further bleeding identified.  The gastric polyp showed chronic glandular proliferation with atypia indefinite for glandular dysplasia with no intestinal metaplasia.  A repeat endoscopy is planned in 3 months.  She is here today with her son.  She is bloated and having diarrhea which occurs every time she eats and she eats very little. She saw her PCP Dr. Qiu this morning.  She is going to meet with hospice tomorrow. Weight is up 13 pounds from her last appointment.

## 2024-03-10 NOTE — ED ADULT NURSE NOTE - PAIN: PRESENCE, MLM
denies pain/discomfort I will START or STAY ON the medications listed below when I get home from the hospital:    ibuprofen 600 mg oral tablet  -- 1 tab(s) by mouth every 6 hours  -- Indication: For  delivery delivered

## 2024-03-13 ENCOUNTER — OV EP (OUTPATIENT)
Dept: URBAN - METROPOLITAN AREA CLINIC 72 | Facility: CLINIC | Age: 79
End: 2024-03-13

## 2025-03-12 NOTE — ED ADULT TRIAGE NOTE - NS ED NURSE BANDS TYPE
Number Of Freeze-Thaw Cycles: 2 freeze-thaw cycles
Render Note In Bullet Format When Appropriate: No
Duration Of Freeze Thaw-Cycle (Seconds): 0
Post-Care Instructions: I reviewed with the patient in detail post-care instructions. Patient is to wear sunprotection, and avoid picking at any of the treated lesions. Pt may apply Vaseline to crusted or scabbing areas.
Consent: The patient's consent was obtained including but not limited to risks of crusting, scabbing, blistering, scarring, darker or lighter pigmentary change, recurrence, incomplete removal and infection.
Detail Level: Detailed
Show Applicator Variable?: Yes
Name band;